# Patient Record
Sex: FEMALE | Race: WHITE | NOT HISPANIC OR LATINO | Employment: UNEMPLOYED | URBAN - METROPOLITAN AREA
[De-identification: names, ages, dates, MRNs, and addresses within clinical notes are randomized per-mention and may not be internally consistent; named-entity substitution may affect disease eponyms.]

---

## 2017-02-13 ENCOUNTER — OFFICE VISIT (OUTPATIENT)
Dept: FAMILY MEDICINE | Facility: OTHER | Age: 32
End: 2017-02-13
Attending: NURSE PRACTITIONER
Payer: COMMERCIAL

## 2017-02-13 VITALS
TEMPERATURE: 97.5 F | HEIGHT: 66 IN | HEART RATE: 94 BPM | DIASTOLIC BLOOD PRESSURE: 62 MMHG | RESPIRATION RATE: 12 BRPM | SYSTOLIC BLOOD PRESSURE: 98 MMHG

## 2017-02-13 DIAGNOSIS — F33.1 MODERATE EPISODE OF RECURRENT MAJOR DEPRESSIVE DISORDER (H): ICD-10-CM

## 2017-02-13 DIAGNOSIS — F41.9 ANXIETY: ICD-10-CM

## 2017-02-13 PROCEDURE — 99214 OFFICE O/P EST MOD 30 MIN: CPT | Performed by: NURSE PRACTITIONER

## 2017-02-13 PROCEDURE — 99212 OFFICE O/P EST SF 10 MIN: CPT

## 2017-02-13 RX ORDER — ESCITALOPRAM OXALATE 10 MG/1
20 TABLET ORAL DAILY
Qty: 60 TABLET | Refills: 1 | Status: SHIPPED | OUTPATIENT
Start: 2017-02-13 | End: 2017-04-05

## 2017-02-13 RX ORDER — HYDROXYZINE HYDROCHLORIDE 25 MG/1
25 TABLET, FILM COATED ORAL EVERY 6 HOURS PRN
Qty: 60 TABLET | Refills: 1 | Status: SHIPPED | OUTPATIENT
Start: 2017-02-13 | End: 2017-04-05

## 2017-02-13 ASSESSMENT — ANXIETY QUESTIONNAIRES
GAD7 TOTAL SCORE: 19
3. WORRYING TOO MUCH ABOUT DIFFERENT THINGS: NEARLY EVERY DAY
1. FEELING NERVOUS, ANXIOUS, OR ON EDGE: NEARLY EVERY DAY
6. BECOMING EASILY ANNOYED OR IRRITABLE: NEARLY EVERY DAY
2. NOT BEING ABLE TO STOP OR CONTROL WORRYING: NEARLY EVERY DAY
7. FEELING AFRAID AS IF SOMETHING AWFUL MIGHT HAPPEN: NEARLY EVERY DAY
5. BEING SO RESTLESS THAT IT IS HARD TO SIT STILL: MORE THAN HALF THE DAYS
IF YOU CHECKED OFF ANY PROBLEMS ON THIS QUESTIONNAIRE, HOW DIFFICULT HAVE THESE PROBLEMS MADE IT FOR YOU TO DO YOUR WORK, TAKE CARE OF THINGS AT HOME, OR GET ALONG WITH OTHER PEOPLE: SOMEWHAT DIFFICULT

## 2017-02-13 ASSESSMENT — PATIENT HEALTH QUESTIONNAIRE - PHQ9: 5. POOR APPETITE OR OVEREATING: MORE THAN HALF THE DAYS

## 2017-02-13 NOTE — PATIENT INSTRUCTIONS
ASSESSMENT AND PLAN    1. Anxiety  - escitalopram (LEXAPRO) 10 MG tablet; Take 2 tablets (20 mg) by mouth daily  Dispense: 60 tablet; Refill: 1  - hydrOXYzine (ATARAX) 25 MG tablet; Take 1 tablet (25 mg) by mouth every 6 hours as needed for itching  Dispense: 60 tablet; Refill: 1    2. Moderate episode of recurrent major depressive disorder (H)  - escitalopram (LEXAPRO) 10 MG tablet; Take 2 tablets (20 mg) by mouth daily  Dispense: 60 tablet; Refill: 1          [facility]  [phone]

## 2017-02-13 NOTE — NURSING NOTE
"Chief Complaint   Patient presents with     Recheck Medication     Patient reports her anxiety has increased.       Initial BP 98/62 (BP Location: Left arm, Patient Position: Chair, Cuff Size: Adult Large)  Pulse 94  Temp 97.5  F (36.4  C) (Tympanic)  Resp 12  Ht 5' 6\" (1.676 m)  Breastfeeding? No Estimated body mass index is 40.03 kg/(m^2) as calculated from the following:    Height as of 12/19/16: 5' 6\" (1.676 m).    Weight as of 12/19/16: 248 lb (112.5 kg).  Medication Reconciliation: complete   Sunitha Diaz      "

## 2017-02-13 NOTE — MR AVS SNAPSHOT
"              After Visit Summary   2/13/2017    Julia Fierro    MRN: [id number]           Patient Information     Date Of Birth          1985        Visit Information        Provider Department      2/13/2017 1:15 PM Samantha Quintana NP [facility]        Today's Diagnoses     Anxiety        Moderate episode of recurrent major depressive disorder (H)          Care Instructions          ASSESSMENT AND PLAN    1. Anxiety  - escitalopram (LEXAPRO) 10 MG tablet; Take 2 tablets (20 mg) by mouth daily  Dispense: 60 tablet; Refill: 1  - hydrOXYzine (ATARAX) 25 MG tablet; Take 1 tablet (25 mg) by mouth every 6 hours as needed for itching  Dispense: 60 tablet; Refill: 1    2. Moderate episode of recurrent major depressive disorder (H)  - escitalopram (LEXAPRO) 10 MG tablet; Take 2 tablets (20 mg) by mouth daily  Dispense: 60 tablet; Refill: 1          Samantha Quintana -[facility]  [phone]                           Follow-ups after your visit        Who to contact     If you have questions or need follow up information about today's clinic visit or your schedule please contact [facility] directly at [phone].  Normal or non-critical lab and imaging results will be communicated to you by MyChart, letter or phone within 4 business days after the clinic has received the results. If you do not hear from us within 7 days, please contact the clinic through indenihart or phone. If you have a critical or abnormal lab result, we will notify you by phone as soon as possible.  Submit refill requests through Prysm or call your pharmacy and they will forward the refill request to us. Please allow 3 business days for your refill to be completed.          Additional Information About Your Visit        MyChart Information     Prysm lets you send messages to your doctor, view your test results, renew your prescriptions, schedule appointments and more. To sign up, go to [email or web address] . Click on \"Log " "in\" on the left side of the screen, which will take you to the Welcome page. Then click on \"Sign up Now\" on the right side of the page.     You will be asked to enter the access code listed below, as well as some personal information. Please follow the directions to create your username and password.     Your access code is: [email or web address]  Expires: 3/19/2017  2:40 PM     Your access code will  in 90 days. If you need help or a new code, please call your [facility] clinic or [phone].        Care EveryWhere ID     This is your Care EveryWhere ID. This could be used by other organizations to access your [facility] medical records  [id number]        Your Vitals Were     Pulse Temperature Respirations Height Breastfeeding?       94 97.5  F (36.4  C) (Tympanic) 12 5' 6\" (1.676 m) No        Blood Pressure from Last 3 Encounters:   17 98/62   16 110/62   07/15/15 126/86    Weight from Last 3 Encounters:   16 248 lb (112.5 kg)   07/15/15 250 lb (113.4 kg)   03/25/15 267 lb (121.1 kg)              Today, you had the following     No orders found for display         Today's Medication Changes          These changes are accurate as of: 17  1:30 PM.  If you have any questions, ask your nurse or doctor.               Start taking these medicines.        Dose/Directions    hydrOXYzine 25 MG tablet   Commonly known as:  ATARAX   Used for:  Anxiety   Started by:  Samantha Quintana NP        Dose:  25 mg   Take 1 tablet (25 mg) by mouth every 6 hours as needed for itching   Quantity:  60 tablet   Refills:  1         These medicines have changed or have updated prescriptions.        Dose/Directions    escitalopram 10 MG tablet   Commonly known as:  LEXAPRO   This may have changed:  how much to take   Used for:  Anxiety, Moderate episode of recurrent major depressive disorder (H)   Changed by:  Samantha Quintana NP        Dose:  20 mg   Take 2 tablets (20 mg) by mouth daily   Quantity:  60 tablet   Refills:  1       "      Where to get your medicines      These medications were sent to [id number] - [place], [place] - [address]   [address] , [facility] [address]     Phone:  [phone]     escitalopram 10 MG tablet    hydrOXYzine 25 MG tablet                Primary Care Provider Office Phone # Fax #    Samantha Quintana [phone]       [address] [address]        Thank you!     Thank you for choosing [facility]  for your care. Our goal is always to provide you with excellent care. Hearing back from our patients is one way we can continue to improve our services. Please take a few minutes to complete the written survey that you may receive in the mail after your visit with us. Thank you!             Your Updated Medication List - Protect others around you: Learn how to safely use, store and throw away your medicines at www.disposemymeds.org.          This list is accurate as of: 2/13/17  1:30 PM.  Always use your most recent med list.                   Brand Name Dispense Instructions for use    aspirin 81 MG tablet      Take 81 mg by mouth daily       escitalopram 10 MG tablet    LEXAPRO    60 tablet    Take 2 tablets (20 mg) by mouth daily       hydrOXYzine 25 MG tablet    ATARAX    60 tablet    Take 1 tablet (25 mg) by mouth every 6 hours as needed for itching

## 2017-02-13 NOTE — PROGRESS NOTES
OUTPATIENT VISIT NOTE      CHIEF COMPLAINT:     Chief Complaint   Patient presents with     Recheck Medication     Patient reports her anxiety has increased.        SUBJECTIVE  Patient presents with a chief complaint of  Anxiety, depression        HPI:  Symptoms have been present for -  Long term  Aggravating factors  - living situation is difficult  Relieving factors -  meds minimally helpful  Recent stressors  - as above  Drug or alcohol use -  no  Past medications  - yes  Therapy  - no  Psychiatric history -  As in epic  Prior hospitalization for mental health concerns -  no  Suicidal Ideation or plan  - no      Past Medical History   Diagnosis Date     Anxiety      Anxiety 7/1/2014     Major depression, recurrent (H) 3/25/2015     Moderate episode of recurrent major depressive disorder (H) 3/25/2015     Panic attacks 7/15/2015     Past Surgical History   Procedure Laterality Date     Laparoscopy  2005     Endometriosis     Extraction(s) dental       wisdom teeth extracted     Family History   Problem Relation Age of Onset     Arthritis Mother      Coronary Artery Disease Mother      DIABETES Paternal Grandmother      Hyperlipidemia Father      Hypertension Father      Other Cancer Father      Social History     Social History     Marital status: Single     Spouse name: N/A     Number of children: N/A     Years of education: N/A     Occupational History     Not on file.     Social History Main Topics     Smoking status: Current Every Day Smoker     Types: Cigarettes     Smokeless tobacco: Never Used      Comment: tried to quit yes, yr quit 2008, no passive exposure     Alcohol use 0.0 oz/week     0 Standard drinks or equivalent per week      Comment: occasionally     Drug use: No     Sexual activity: Yes     Partners: Male     Birth control/ protection: Condom, Diaphragm     Other Topics Concern     Blood Transfusions Yes     Caffeine Concern Yes     soda, 16oz daily     Parent/Sibling W/ Cabg, Mi Or Angioplasty  "Before 65f 55m? Yes     Mother was 51      Social History Narrative     Allergies   Allergen Reactions     Sertraline Hcl      Lightheaded  Shakes        Shakes       Penicillins Rash       Current Outpatient Prescriptions   Medication     escitalopram (LEXAPRO) 10 MG tablet     aspirin 81 MG tablet     No current facility-administered medications for this visit.          REVIEW OF SYSTEMS  Skin: negative  Eyes: negative  Ears/Nose/Throat: negative  Respiratory: No shortness of breath, dyspnea on exertion, cough, or hemoptysis  Cardiovascular: negative  Gastrointestinal: negative  Musculoskeletal: negative  Neurologic: negative  Psychiatric: As above  Endocrine: negative      OBJECTIVE:   BP 98/62 (BP Location: Left arm, Patient Position: Chair, Cuff Size: Adult Large)  Pulse 94  Temp 97.5  F (36.4  C) (Tympanic)  Resp 12  Ht 5' 6\" (1.676 m)  Breastfeeding? No    Mental Status Assessment:  -Appearance/Behavior:No apparent distress and Casually groomed, attitude:pleasant and cooperative  -Motor: normal or unremarkable.  -Gait: Normal.   -Abnormal involuntary movements: None.  -Mood: description consistent with depression  -Affect: down, blunted  -Speech: Normal, clear, regular rate and volume.   -Thought process/associations: Logical and Goal directed.  -Thought content: normal .  -Perceptual disturbances: No hallucinations..   -Suicidal/Homicidal Ideation: Denies  -Judgment: Good.  -Insight: Good.  *Orientation: time, place and person.  *Memory: intact immediate, short and long term.  *Attention: Adequate for interview  *Language: fluent, no aphasias, able to repeat phrases and name objects. Vocab intact.  *Fund of information: appropriate for education  *Cognitive functioning estimate: 0 - independent.        Physical Exam:  Head: Normocephalic.   Neck: Neck supple. No adenopathy.   ENT: ENT exam normal, no neck nodes or sinus tenderness  Cardiovascular: negative, PMI normal.  No murmurs, clicks gallops or " rub  Respiratory:  Good diaphragmatic excursion. Lungs clear  Abdomen: Soft, non tender, active bowel sounds  Neurologic: Gait normal.  Sensation grossly  Skin: Normal, no cuts, or other notable abnormal findings    Last PHQ-9 score on record= 16      ASSESSMENT AND PLAN    1. Anxiety  - escitalopram (LEXAPRO) 10 MG tablet; Take 2 tablets (20 mg) by mouth daily  Dispense: 60 tablet; Refill: 1  - hydrOXYzine (ATARAX) 25 MG tablet; Take 1 tablet (25 mg) by mouth every 6 hours as needed for itching  Dispense: 60 tablet; Refill: 1    2. Moderate episode of recurrent major depressive disorder (H)  - escitalopram (LEXAPRO) 10 MG tablet; Take 2 tablets (20 mg) by mouth daily  Dispense: 60 tablet; Refill: 1          [facility] [place]  [phone]

## 2017-02-14 ENCOUNTER — TELEPHONE (OUTPATIENT)
Dept: FAMILY MEDICINE | Facility: OTHER | Age: 32
End: 2017-02-14

## 2017-02-14 ASSESSMENT — PATIENT HEALTH QUESTIONNAIRE - PHQ9: SUM OF ALL RESPONSES TO PHQ QUESTIONS 1-9: 15

## 2017-02-14 ASSESSMENT — ANXIETY QUESTIONNAIRES: GAD7 TOTAL SCORE: 19

## 2017-02-14 NOTE — TELEPHONE ENCOUNTER
12:19 PM    Reason for Call: Phone Call    Description: She would like her prescriptions to go to [facility].  She [facility]    Was an appointment offered for this call? No    Preferred method for responding to this message: Telephone Call    If we cannot reach you directly, may we leave a detailed response at the number you provided? Yes    Can this message wait until your PCP/provider returns, if available today? Not applicable,     Henny Stern

## 2017-03-27 ENCOUNTER — TELEPHONE (OUTPATIENT)
Dept: FAMILY MEDICINE | Facility: OTHER | Age: 32
End: 2017-03-27

## 2017-03-27 NOTE — TELEPHONE ENCOUNTER
I just had a few of my patients just find out they are pregnant, so I am full around that time (due around the end of the year).  She could see Geoffrey Quezada, midwife, who is covered by our OB providers in [place].  As far as the Lexapro, we would ideally change her over to Zoloft, which has a more established record in pregnancy, but she should run that by Samantha Quintana.

## 2017-03-27 NOTE — TELEPHONE ENCOUNTER
Call to patient who is currently in ER due to stomach pains and getting the baby checked out, she thanks you and just needs to make sure baby is alright

## 2017-03-27 NOTE — TELEPHONE ENCOUNTER
11:43 AM    Reason for Call: Phone Call    Description: Patient is a patient of Samantha Burnett, but just found out that she is pregnant and would like to set up an OB appt. Is Dr. De Leon able to take her as a new OB? Also, patient is on depression medication, Lexapro, and would like to know if she should stop taking this medication or if she should be taking something else. Patient stated that it is ok to leave message, especially regarding the medication.      Was an appointment offered for this call? Yes [facility] - 04/04    Preferred method for responding to this message: Telephone Call    If we cannot reach you directly, may we leave a detailed response at the number you provided? Yes    Can this message wait until your PCP/provider returns, if available today? Not applicable,     Annika Jessica

## 2017-03-31 ENCOUNTER — TRANSFERRED RECORDS (OUTPATIENT)
Dept: HEALTH INFORMATION MANAGEMENT | Facility: HOSPITAL | Age: 32
End: 2017-03-31

## 2017-03-31 LAB
ALT SERPL-CCNC: 48 U/L (ref 6–31)
AST SERPL-CCNC: 30 IU/L (ref 10–40)
CREAT SERPL-MCNC: 0.66 MG/DL (ref 0.4–1)
GLUCOSE SERPL-MCNC: 122 MG/DL (ref 70–100)
POTASSIUM SERPL-SCNC: 3.6 MEQ/L (ref 3.4–5.1)

## 2017-04-05 ENCOUNTER — HOSPITAL ENCOUNTER (EMERGENCY)
Facility: HOSPITAL | Age: 32
Discharge: HOME | End: 2017-04-05
Attending: PHYSICIAN ASSISTANT | Admitting: PHYSICIAN ASSISTANT
Payer: COMMERCIAL

## 2017-04-05 VITALS
SYSTOLIC BLOOD PRESSURE: 124 MMHG | TEMPERATURE: 98.3 F | HEIGHT: 66 IN | RESPIRATION RATE: 16 BRPM | OXYGEN SATURATION: 96 % | DIASTOLIC BLOOD PRESSURE: 75 MMHG

## 2017-04-05 DIAGNOSIS — F41.8 MIXED ANXIETY DEPRESSIVE DISORDER: ICD-10-CM

## 2017-04-05 DIAGNOSIS — Z34.90 PREGNANCY, UNSPECIFIED GESTATIONAL AGE: ICD-10-CM

## 2017-04-05 LAB — B-HCG SERPL-ACNC: ABNORMAL IU/L (ref 0–5)

## 2017-04-05 PROCEDURE — 99213 OFFICE O/P EST LOW 20 MIN: CPT | Performed by: PHYSICIAN ASSISTANT

## 2017-04-05 PROCEDURE — 84702 CHORIONIC GONADOTROPIN TEST: CPT | Performed by: PHYSICIAN ASSISTANT

## 2017-04-05 PROCEDURE — 99213 OFFICE O/P EST LOW 20 MIN: CPT

## 2017-04-05 PROCEDURE — 36415 COLL VENOUS BLD VENIPUNCTURE: CPT | Performed by: PHYSICIAN ASSISTANT

## 2017-04-05 RX ORDER — SWAB
1 SWAB, NON-MEDICATED MISCELLANEOUS DAILY
Qty: 30 EACH | Refills: 0 | Status: SHIPPED | OUTPATIENT
Start: 2017-04-05 | End: 2021-05-12

## 2017-04-05 RX ORDER — SERTRALINE HYDROCHLORIDE 25 MG/1
25 TABLET, FILM COATED ORAL DAILY
Qty: 14 TABLET | Refills: 0 | Status: SHIPPED | OUTPATIENT
Start: 2017-04-05 | End: 2017-04-15

## 2017-04-05 ASSESSMENT — ENCOUNTER SYMPTOMS
CARDIOVASCULAR NEGATIVE: 1
RESPIRATORY NEGATIVE: 1
CONSTITUTIONAL NEGATIVE: 1
NERVOUS/ANXIOUS: 1

## 2017-04-05 NOTE — ED AVS SNAPSHOT
[facility]    [address]    [place] [address]    Phone:  [phone]                                       Julia Fierro   MRN: [id number]    Department:  [facility]   Date of Visit:  4/5/2017           Patient Information     Date Of Birth          1985        Your diagnoses for this visit were:     None       You were seen by Guillermo Masters PA.      Follow-up Information     Please follow up.    Why:  Samantha in 7 days        Discharge Instructions       - Will start with low dose zoloft and follow up with Samantha in 1 week.   - I also ordered the vitamin D and prenatal to get you started.   - Last HCG was 2713, todays is pending - we can call with results. Call back here in 1 hr if you dont hear anything.    Discharge References/Attachments     PREGNANCY, NEW DX (ENGLISH)    PREGNANCY, NUTRITION DURING (ENGLISH)         Review of your medicines      START taking        Dose / Directions Last dose taken    prenatal multivitamin  with iron 28-0.8 MG Tabs   Dose:  1 tablet   Quantity:  30 each        Take 1 tablet by mouth daily   Refills:  0        sertraline 25 MG tablet   Commonly known as:  ZOLOFT   Dose:  25 mg   Quantity:  14 tablet        Take 1 tablet (25 mg) by mouth daily   Refills:  0        VITAMIN D3 HIGH POTENCY 1000 UNITS Caps   Dose:  1 capsule   Quantity:  30 capsule        Take 1 capsule by mouth daily   Refills:  0          Our records show that you are taking the medicines listed below. If these are incorrect, please call your family doctor or clinic.        Dose / Directions Last dose taken    aspirin 81 MG tablet   Dose:  81 mg        Take 81 mg by mouth daily   Refills:  0          STOP taking        Dose Reason for stopping Comments    escitalopram 10 MG tablet   Commonly known as:  LEXAPRO                      Prescriptions were sent or printed at these locations (3 Prescriptions)                   [facility] [address] [place], [place] - [address] [facility] [address]   [address], [facility] [address]    Telephone:  [phone]   Fax:  [phone]   Hours:                  E-Prescribed (3 of 3)         sertraline (ZOLOFT) 25 MG tablet               Cholecalciferol (VITAMIN D3 HIGH POTENCY) 1000 UNITS CAPS               Prenatal Vit-Fe Fumarate-FA (PRENATAL MULTIVITAMIN  WITH IRON) 28-0.8 MG TABS                Procedures and tests performed during your visit     HCG quantitative pregnancy (blood)      Orders Needing Specimen Collection     None      Pending Results     Date and Time Order Name Status Description    2017 1411 HCG quantitative pregnancy (blood) In process             Pending Culture Results     No orders found from 4/3/2017 to 2017.            Thank you for choosing [facility]       Thank you for choosing [facility] for your care. Our goal is always to provide you with excellent care. Hearing back from our patients is one way we can continue to improve our services. Please take a few minutes to complete the written survey that you may receive in the mail after you visit with us. Thank you!        [email or web address] Information     [email or web address] lets you send messages to your doctor, view your test results, renew your prescriptions, schedule appointments and more. To sign up, go to [email or web address] . Click on \"Log in\" on the left side of the screen, which will take you to the Welcome page. Then click on \"Sign up Now\" on the right side of the page.     You will be asked to enter the access code listed below, as well as some personal information. Please follow the directions to create your username and password.     Your access code is: [email or web address]  Expires: 2017  2:58 PM     Your access code will  in 90 days. If you need help or a new code, please call your [facility] clinic or [phone].        Care EveryWhere ID     This is your Care EveryWhere ID. This could be used by other organizations to access " your [facility] medical records  [id number]        After Visit Summary       This is your record. Keep this with you and show to your community pharmacist(s) and doctor(s) at your next visit.

## 2017-04-05 NOTE — ED NOTES
"LMP 2/3/2017. Pregnancy test done at clinic. Denies any vaginal discharge or cramping. Stated she quit her lexapro when she found out she was pregnant 2 weeks ago and now doesn't feel like herself. \"I want to know what is safe to take while pregnant. Pt stated she had a scheduled OB appointment to day that she missed.  "

## 2017-04-05 NOTE — ED PROVIDER NOTES
"  History     Chief Complaint   Patient presents with     Medication Refill     Pt concerned what medications she should be on while pregnant. Was on lexapro- has been off for 2 weeks.     The history is provided by the patient. No  was used.     Julia Fierro is a 31 year old 8? wk pg female with Hx anxiety/depression, who presents requesting advice regarding what medications she can take during pregnancy. First day LMP was 2/4/2017. She reports feeling quite anxious, but NO SI/HI.     NO vaginal bleeding, no pelvic pain, and no further abdominal pain since visit to ED on 3/21/17. It appears they were initially concerned about STI vs ectopic. Quantitative HCG was 2713. Pt left ED prior to US/pevlic. See Care Everywhere for further details.    I have reviewed the Medications, Allergies, Past Medical and Surgical History, and Social History in the Epic system.    Review of Systems   Constitutional: Negative.    Respiratory: Negative.    Cardiovascular: Negative.    Psychiatric/Behavioral: The patient is nervous/anxious.        Physical Exam   BP: 124/75  Heart Rate: 83  Temp: 98.3  F (36.8  C)  Resp: 16  Height: 167.6 cm (5' 6\")  SpO2: 96 %  Physical Exam   Constitutional: She is oriented to person, place, and time. She appears well-developed and well-nourished. No distress.   Cardiovascular: Normal rate.    Pulmonary/Chest: Effort normal.   Neurological: She is alert and oriented to person, place, and time.   Skin: Skin is warm and dry.   Psychiatric: She has a normal mood and affect.   Nursing note and vitals reviewed.      ED Course     ED Course     Procedures          Assessments & Plan (with Medical Decision Making)     I have reviewed the nursing notes.    I have reviewed the findings, diagnosis, plan and need for follow up with the patient.    Discharge Medication List as of 4/5/2017  2:58 PM      START taking these medications    Details   sertraline (ZOLOFT) 25 MG tablet Take 1 tablet " (25 mg) by mouth daily, Disp-14 tablet, R-0, E-Prescribe      Cholecalciferol (VITAMIN D3 HIGH POTENCY) 1000 UNITS CAPS Take 1 capsule by mouth daily, Disp-30 capsule, R-0, E-Prescribe      Prenatal Vit-Fe Fumarate-FA (PRENATAL MULTIVITAMIN  WITH IRON) 28-0.8 MG TABS Take 1 tablet by mouth daily, Disp-30 each, R-0, E-Prescribe             Final diagnoses:   Mixed anxiety depressive disorder   Pregnancy, unspecified gestational age   Pg wheel indicates 8 wk, 4D, hcg quant has risen from 2713 to 09594 since discharge from ED on 3/31/17. Pt denies pain, spotting/bleeding.     PCP (Samantha Quintana NP) advised trial zoloft at low dose 25 mg. Pt had ASE in the past, so she will stop medication and seek attention with ANY concerns regarding initiating med. She will otherwise f/u with PCP in 7 days. Patient verbally educated and has no questions.    Guillermo Masters PA-C   4/5/2017   4:01 PM    4/5/2017   [facility]     Guillermo Masters PA  04/05/17 0226

## 2017-04-05 NOTE — ED AVS SNAPSHOT
[facility]    [address] [address]    Phone:  [phone]                                       Julia Fierro   MRN: [id number]    Department:  [facility]   Date of Visit:  4/5/2017           After Visit Summary Signature Page     I have received my discharge instructions, and my questions have been answered. I have discussed any challenges I see with this plan with the nurse or doctor.    ..........................................................................................................................................  Patient/Patient Representative Signature      ..........................................................................................................................................  Patient Representative Print Name and Relationship to Patient    ..................................................               ................................................  Date                                            Time    ..........................................................................................................................................  Reviewed by Signature/Title    ...................................................              ..............................................  Date                                                            Time

## 2017-04-05 NOTE — ED NOTES
Patient anxious & patient quit taking Lexapro 10 mg. Is concerned with her baby & how far along she is.

## 2017-04-15 ENCOUNTER — HOSPITAL ENCOUNTER (EMERGENCY)
Facility: HOSPITAL | Age: 32
Discharge: HOME | End: 2017-04-15
Attending: NURSE PRACTITIONER | Admitting: NURSE PRACTITIONER
Payer: COMMERCIAL

## 2017-04-15 VITALS
DIASTOLIC BLOOD PRESSURE: 65 MMHG | OXYGEN SATURATION: 99 % | SYSTOLIC BLOOD PRESSURE: 126 MMHG | TEMPERATURE: 96.5 F | RESPIRATION RATE: 16 BRPM

## 2017-04-15 DIAGNOSIS — K08.89 PAIN, DENTAL: ICD-10-CM

## 2017-04-15 DIAGNOSIS — Z33.1 PREGNANCY, INCIDENTAL: ICD-10-CM

## 2017-04-15 PROCEDURE — 99213 OFFICE O/P EST LOW 20 MIN: CPT | Performed by: NURSE PRACTITIONER

## 2017-04-15 PROCEDURE — 99213 OFFICE O/P EST LOW 20 MIN: CPT

## 2017-04-15 RX ORDER — CHLORHEXIDINE GLUCONATE ORAL RINSE 1.2 MG/ML
10 SOLUTION DENTAL 2 TIMES DAILY
Qty: 473 ML | Refills: 0 | Status: SHIPPED | OUTPATIENT
Start: 2017-04-15 | End: 2017-04-18

## 2017-04-15 ASSESSMENT — ENCOUNTER SYMPTOMS
APPETITE CHANGE: 1
FEVER: 0

## 2017-04-15 NOTE — ED PROVIDER NOTES
History     Chief Complaint   Patient presents with     Dental Pain     The history is provided by the patient. No  was used.     Julia Fierro is a 31 year old female who presents with R upper dental pain for 5 days, much worse today. Has pain in her upper maxillary area and a headache. Hasn't taken anything for her symptoms. Is 11 weeks pregnant. Has had worked on this tooth several times in the past few day.    I have reviewed the Medications, Allergies, Past Medical and Surgical History, and Social History in the Epic system.    Review of Systems   Constitutional: Positive for appetite change (Hasn't eaten yet and feels poorly). Negative for fever.   HENT: Positive for dental problem.        Physical Exam   BP: 126/65  Heart Rate: 72  Temp: 96.5  F (35.8  C)  Resp: 16  SpO2: 99 %  Physical Exam   Constitutional: She appears well-developed and well-nourished. No distress.   HENT:   Head: Normocephalic and atraumatic.   Right Ear: External ear normal.   Left Ear: External ear normal.   Nose: Nose normal.   Mouth/Throat: Oropharynx is clear and moist. No oropharyngeal exudate.   Eyes: Conjunctivae are normal. No scleral icterus.   Neck: Normal range of motion. Neck supple.   Cardiovascular: Normal rate.    Pulmonary/Chest: Effort normal.   Skin: She is not diaphoretic.   Nursing note and vitals reviewed.      ED Course     ED Course     Procedures            Labs Ordered and Resulted from Time of ED Arrival Up to the Time of Departure from the ED - No data to display    Assessments & Plan (with Medical Decision Making)     I have reviewed the nursing notes.  I have reviewed the findings, diagnosis, plan and need for follow up with the patient.  No infection evident.   Will place consult for  to get her an appointment at the [facility] where she has been seen before.   As she is pregnant and established with, she should have priority.   Will order mouthwash and Tylenol for her.    Is scheduled to see Ob/Gyn next week for her current pregnancy.     New Prescriptions    ACETAMINOPHEN (TYLENOL) 325 MG CAPS    Take 2 tablets by mouth 3 times daily as needed    CHLORHEXIDINE (PERIDEX) 0.12 % SOLUTION    Swish and spit 10 mLs in mouth 2 times daily DO NOT SWALLOW       Final diagnoses:   Pain, dental   Pregnancy, incidental       4/15/2017   [facility]     Analisa Boucher NP  04/15/17 1154       Analisa Boucher NP  04/15/17 1200

## 2017-04-15 NOTE — ED AVS SNAPSHOT
[facility]    [address] [address]    Phone:  [phone]                                       Julia Fierro   MRN: [id number]    Department:  [facility]   Date of Visit:  4/15/2017           After Visit Summary Signature Page     I have received my discharge instructions, and my questions have been answered. I have discussed any challenges I see with this plan with the nurse or doctor.    ..........................................................................................................................................  Patient/Patient Representative Signature      ..........................................................................................................................................  Patient Representative Print Name and Relationship to Patient    ..................................................               ................................................  Date                                            Time    ..........................................................................................................................................  Reviewed by Signature/Title    ...................................................              ..............................................  Date                                                            Time

## 2017-04-15 NOTE — ED AVS SNAPSHOT
[facility]    [address] [address]    Phone:  [phone]                                       Julia Fierro   MRN: [id number]    Department:  [facility]   Date of Visit:  4/15/2017           Patient Information     Date Of Birth          1985        Your diagnoses for this visit were:     Pain, dental     Pregnancy, incidental        You were seen by Analisa Boucher NP.      Follow-up Information     Please follow up.    Why:  See the dentist ASAP, the  will call you with an appointment        Discharge Instructions       Will have the  set up a follow up dentist visit with the [facility].        Discharge References/Attachments     DENTAL PAIN (ENGLISH)      Future Appointments        Provider Department Dept Phone Center    4/18/2017 3:00 PM Dariel Hallman MD [facility] [phone] [facility]         Review of your medicines      START taking        Dose / Directions Last dose taken    Acetaminophen 325 MG Caps   Commonly known as:  TYLENOL   Dose:  2 tablet   Quantity:  100 capsule        Take 2 tablets by mouth 3 times daily as needed   Refills:  0        chlorhexidine 0.12 % solution   Commonly known as:  PERIDEX   Dose:  10 mL   Quantity:  473 mL        Swish and spit 10 mLs in mouth 2 times daily DO NOT SWALLOW   Refills:  0          Our records show that you are taking the medicines listed below. If these are incorrect, please call your family doctor or clinic.        Dose / Directions Last dose taken    prenatal multivitamin  with iron 28-0.8 MG Tabs   Dose:  1 tablet   Quantity:  30 each        Take 1 tablet by mouth daily   Refills:  0        VITAMIN D3 HIGH POTENCY 1000 UNITS Caps   Dose:  1 capsule   Quantity:  30 capsule        Take 1 capsule by mouth daily   Refills:  0                Prescriptions were sent or printed at these locations (2 Prescriptions)                   [facility]  "[id number] - [place], [place] - [address] AT [facility] [address]   [address], [place] [address]    Telephone:  [phone]   Fax:  [phone]   Hours:                  E-Prescribed (2 of 2)         Acetaminophen (TYLENOL) 325 MG CAPS               chlorhexidine (PERIDEX) 0.12 % solution                Orders Needing Specimen Collection     None      Pending Results     No orders found from 2017 to 2017.            Pending Culture Results     No orders found from 2017 to 2017.            Thank you for choosing [facility]       Thank you for choosing [facility] for your care. Our goal is always to provide you with excellent care. Hearing back from our patients is one way we can continue to improve our services. Please take a few minutes to complete the written survey that you may receive in the mail after you visit with us. Thank you!        [employer] Information     [employer] lets you send messages to your doctor, view your test results, renew your prescriptions, schedule appointments and more. To sign up, go to [email or web address] . Click on \"Log in\" on the left side of the screen, which will take you to the Welcome page. Then click on \"Sign up Now\" on the right side of the page.     You will be asked to enter the access code listed below, as well as some personal information. Please follow the directions to create your username and password.     Your access code is: [email or web address]  Expires: 2017  2:58 PM     Your access code will  in 90 days. If you need help or a new code, please call your [facility] clinic or [phone].        Care EveryWhere ID     This is your Care EveryWhere ID. This could be used by other organizations to access your [facility] medical records  [id number]        After Visit Summary       This is your record. Keep this with you and show to your community pharmacist(s) and doctor(s) at your next visit.                  "

## 2017-04-18 ENCOUNTER — PRENATAL OFFICE VISIT (OUTPATIENT)
Dept: OBGYN | Facility: OTHER | Age: 32
End: 2017-04-18
Attending: OBSTETRICS & GYNECOLOGY
Payer: COMMERCIAL

## 2017-04-18 VITALS
DIASTOLIC BLOOD PRESSURE: 60 MMHG | OXYGEN SATURATION: 99 % | HEIGHT: 66 IN | HEART RATE: 61 BPM | BODY MASS INDEX: 36.96 KG/M2 | SYSTOLIC BLOOD PRESSURE: 112 MMHG | WEIGHT: 230 LBS

## 2017-04-18 DIAGNOSIS — R82.90 ABNORMAL URINE FINDINGS: ICD-10-CM

## 2017-04-18 DIAGNOSIS — F41.9 ANXIETY: ICD-10-CM

## 2017-04-18 DIAGNOSIS — Z34.91 PREGNANCY WITH UNCERTAIN DATES, FIRST TRIMESTER: ICD-10-CM

## 2017-04-18 DIAGNOSIS — Z34.81 ENCOUNTER FOR SUPERVISION OF OTHER NORMAL PREGNANCY, FIRST TRIMESTER: Primary | ICD-10-CM

## 2017-04-18 LAB
ABO + RH BLD: NORMAL
ABO + RH BLD: NORMAL
ALBUMIN UR-MCNC: NEGATIVE MG/DL
AMPHETAMINES UR QL SCN: ABNORMAL
APPEARANCE UR: CLEAR
BACTERIA #/AREA URNS HPF: ABNORMAL /HPF
BARBITURATES UR QL: ABNORMAL
BENZODIAZ UR QL: ABNORMAL
BILIRUB UR QL STRIP: NEGATIVE
BLD GP AB SCN SERPL QL: NORMAL
BLOOD BANK CMNT PATIENT-IMP: NORMAL
BLOOD BANK CMNT PATIENT-IMP: NORMAL
CANNABINOIDS UR QL SCN: ABNORMAL
COCAINE UR QL: ABNORMAL
COLOR UR AUTO: ABNORMAL
ERYTHROCYTE [DISTWIDTH] IN BLOOD BY AUTOMATED COUNT: 12.4 % (ref 10–15)
EST. AVERAGE GLUCOSE BLD GHB EST-MCNC: 94 MG/DL
GLUCOSE SERPL-MCNC: 85 MG/DL (ref 70–99)
GLUCOSE UR STRIP-MCNC: NEGATIVE MG/DL
HBA1C MFR BLD: 4.9 % (ref 4.3–6)
HCT VFR BLD AUTO: 33.5 % (ref 35–47)
HGB BLD-MCNC: 11.7 G/DL (ref 11.7–15.7)
HGB UR QL STRIP: NEGATIVE
KETONES UR STRIP-MCNC: NEGATIVE MG/DL
LEUKOCYTE ESTERASE UR QL STRIP: ABNORMAL
MCH RBC QN AUTO: 31.5 PG (ref 26.5–33)
MCHC RBC AUTO-ENTMCNC: 34.9 G/DL (ref 31.5–36.5)
MCV RBC AUTO: 90 FL (ref 78–100)
METHADONE UR QL SCN: ABNORMAL
MUCOUS THREADS #/AREA URNS LPF: PRESENT /LPF
NITRATE UR QL: NEGATIVE
OPIATES UR QL SCN: ABNORMAL
PCP UR QL SCN: ABNORMAL
PH UR STRIP: 6.5 PH (ref 4.7–8)
PLATELET # BLD AUTO: 245 10E9/L (ref 150–450)
RBC # BLD AUTO: 3.71 10E12/L (ref 3.8–5.2)
RBC #/AREA URNS AUTO: 0 /HPF (ref 0–2)
SP GR UR STRIP: 1.01 (ref 1–1.03)
SPECIMEN EXP DATE BLD: NORMAL
SQUAMOUS #/AREA URNS AUTO: 9 /HPF (ref 0–1)
TSH SERPL DL<=0.05 MIU/L-ACNC: 0.94 MU/L (ref 0.4–4)
URN SPEC COLLECT METH UR: ABNORMAL
UROBILINOGEN UR STRIP-MCNC: NORMAL MG/DL (ref 0–2)
WBC # BLD AUTO: 8.9 10E9/L (ref 4–11)
WBC #/AREA URNS AUTO: 2 /HPF (ref 0–2)

## 2017-04-18 PROCEDURE — 82947 ASSAY GLUCOSE BLOOD QUANT: CPT | Performed by: OBSTETRICS & GYNECOLOGY

## 2017-04-18 PROCEDURE — 85027 COMPLETE CBC AUTOMATED: CPT | Performed by: OBSTETRICS & GYNECOLOGY

## 2017-04-18 PROCEDURE — 80349 CANNABINOIDS NATURAL: CPT | Performed by: OBSTETRICS & GYNECOLOGY

## 2017-04-18 PROCEDURE — 36415 COLL VENOUS BLD VENIPUNCTURE: CPT | Performed by: OBSTETRICS & GYNECOLOGY

## 2017-04-18 PROCEDURE — 76817 TRANSVAGINAL US OBSTETRIC: CPT | Mod: TC | Performed by: OBSTETRICS & GYNECOLOGY

## 2017-04-18 PROCEDURE — 87340 HEPATITIS B SURFACE AG IA: CPT | Performed by: OBSTETRICS & GYNECOLOGY

## 2017-04-18 PROCEDURE — 84443 ASSAY THYROID STIM HORMONE: CPT | Performed by: OBSTETRICS & GYNECOLOGY

## 2017-04-18 PROCEDURE — 86762 RUBELLA ANTIBODY: CPT | Performed by: OBSTETRICS & GYNECOLOGY

## 2017-04-18 PROCEDURE — 99000 SPECIMEN HANDLING OFFICE-LAB: CPT | Performed by: OBSTETRICS & GYNECOLOGY

## 2017-04-18 PROCEDURE — 80307 DRUG TEST PRSMV CHEM ANLYZR: CPT | Performed by: OBSTETRICS & GYNECOLOGY

## 2017-04-18 PROCEDURE — 40000788 ZZHCL STATISTIC ESTIMATED AVERAGE GLUCOSE: Performed by: OBSTETRICS & GYNECOLOGY

## 2017-04-18 PROCEDURE — 99213 OFFICE O/P EST LOW 20 MIN: CPT | Mod: 25 | Performed by: COUNSELOR

## 2017-04-18 PROCEDURE — 87389 HIV-1 AG W/HIV-1&-2 AB AG IA: CPT | Performed by: OBSTETRICS & GYNECOLOGY

## 2017-04-18 PROCEDURE — 83036 HEMOGLOBIN GLYCOSYLATED A1C: CPT | Performed by: OBSTETRICS & GYNECOLOGY

## 2017-04-18 PROCEDURE — 86900 BLOOD TYPING SEROLOGIC ABO: CPT | Performed by: OBSTETRICS & GYNECOLOGY

## 2017-04-18 PROCEDURE — 81001 URINALYSIS AUTO W/SCOPE: CPT | Performed by: OBSTETRICS & GYNECOLOGY

## 2017-04-18 PROCEDURE — 99207 ZZC FIRST OB VISIT: CPT | Mod: 25 | Performed by: OBSTETRICS & GYNECOLOGY

## 2017-04-18 PROCEDURE — 86901 BLOOD TYPING SEROLOGIC RH(D): CPT | Performed by: OBSTETRICS & GYNECOLOGY

## 2017-04-18 PROCEDURE — 86780 TREPONEMA PALLIDUM: CPT | Performed by: OBSTETRICS & GYNECOLOGY

## 2017-04-18 PROCEDURE — 86850 RBC ANTIBODY SCREEN: CPT | Performed by: OBSTETRICS & GYNECOLOGY

## 2017-04-18 PROCEDURE — 87086 URINE CULTURE/COLONY COUNT: CPT | Performed by: OBSTETRICS & GYNECOLOGY

## 2017-04-18 ASSESSMENT — PAIN SCALES - GENERAL: PAINLEVEL: NO PAIN (0)

## 2017-04-18 NOTE — NURSING NOTE
"Chief Complaint   Patient presents with     Prenatal Care       Initial /60  Pulse 61  Ht 5' 6\" (1.676 m)  Wt 230 lb (104.3 kg)  SpO2 99%  BMI 37.12 kg/m2 Estimated body mass index is 37.12 kg/(m^2) as calculated from the following:    Height as of this encounter: 5' 6\" (1.676 m).    Weight as of this encounter: 230 lb (104.3 kg).  Medication Reconciliation: complete     Enedina Myles      "

## 2017-04-18 NOTE — PROGRESS NOTES
"  HPI:  Julia Fierro is a 31 year old female  No LMP recorded. Patient is pregnant. at Unknown, Estimated Date of Delivery: Data Unavailable.  She denies vaginal bleeding and abdominal pain.   Some morning sickness.    Stopped Lexapro when she found out she was pregnant.  Some increased anxiety since.    No other c/o.  Denies smoking/drug use.     Past Medical History:   Diagnosis Date     Anxiety      Anxiety 2014     Major depression, recurrent (H) 3/25/2015     Moderate episode of recurrent major depressive disorder (H) 3/25/2015     Panic attacks 7/15/2015       Past Surgical History:   Procedure Laterality Date     EXTRACTION(S) DENTAL      wisdom teeth extracted     LAPAROSCOPY      Endometriosis       Allergies: Sertraline hcl and Penicillins     EXAM:  Blood pressure 112/60, pulse 61, height 5' 6\" (1.676 m), weight 230 lb (104.3 kg), SpO2 99 %, not currently breastfeeding.   BMI= Body mass index is 37.12 kg/(m^2).  General - pleasant female in no acute distress.  Abdomen - soft, nontender, nondistended, no hepatosplenomegaly.  Pelvic - EG: normal adult female, BUS: within normal limits,Rectovaginal - deferred.    US:    Transvaginal:Yes  Yolk sac present:Yes  CRL:  9.2mm  FCA present:Yes  EGA 6w 6d  DEANGELO:17          ASSESSMENT/PLAN:  (Z34.81) Encounter for supervision of other normal pregnancy, first trimester  (primary encounter diagnosis)  Comment: Uncertain dates, use US DEANGELO  Plan: ABO/Rh type and screen, Anti Treponema, CBC         with platelets, Drug Screen Urine ([facility]),         Hepatitis B surface antigen, HIV Antigen         Antibody Combo, Rubella Antibody IgG         Quantitative, UA with Microscopic reflex to         Culture, TSH, Hemoglobin A1c, Glucose,         Estimated Average Glucose    Anxiety:  Medication uses, risks in first trimester discussed.  Consider restarting Lexapro if worsens or second trimester.    Handouts on morning sickness and OTC treatments reviewed with " pt.         1st Trimester precautions and testing discussed.  New OB Labs ordered and exam scheduled.  Aneuploidy testing options discussed.  Patient has my card and phone number to call prn if problems in interim.    Dariel Hallman

## 2017-04-18 NOTE — MR AVS SNAPSHOT
"              After Visit Summary   4/18/2017    Julia Fierro    MRN: [id number]           Patient Information     Date Of Birth          1985        Visit Information        Provider Department      4/18/2017 3:00 PM Dariel Hallman MD [facility]        Today's Diagnoses     Encounter for supervision of other normal pregnancy, first trimester    -  1    Abnormal urine findings        Pregnancy with uncertain dates, first trimester        Anxiety          Care Instructions    F/u 4 wks        Follow-ups after your visit        Your next 10 appointments already scheduled     May 18, 2017  1:45 PM CDT   (Arrive by 1:30 PM)   New Prenatal with Anais Larry NP   [facility] ([facility])    [address]  [facility] [address]   [phone]              Who to contact     If you have questions or need follow up information about today's clinic visit or your schedule please contact [facility] directly at [phone].  Normal or non-critical lab and imaging results will be communicated to you by MyChart, letter or phone within 4 business days after the clinic has received the results. If you do not hear from us within 7 days, please contact the clinic through MyChart or phone. If you have a critical or abnormal lab result, we will notify you by phone as soon as possible.  Submit refill requests through [employer] or call your pharmacy and they will forward the refill request to us. Please allow 3 business days for your refill to be completed.          Additional Information About Your Visit        [employer] Information     [employer] lets you send messages to your doctor, view your test results, renew your prescriptions, schedule appointments and more. To sign up, go to [email or web address] . Click on \"Log in\" on the left side of the screen, which will take you to the Welcome page. Then click on \"Sign up Now\" on the right side of the page.     You will be asked to " "enter the access code listed below, as well as some personal information. Please follow the directions to create your username and password.     Your access code is: [email or web address]  Expires: 2017  2:58 PM     Your access code will  in 90 days. If you need help or a new code, please call your [facility] or [phone].        Care EveryWhere ID     This is your Care EveryWhere ID. This could be used by other organizations to access your [facility] medical records  [id number]        Your Vitals Were     Pulse Height Pulse Oximetry BMI (Body Mass Index)          61 5' 6\" (1.676 m) 99% 37.12 kg/m2         Blood Pressure from Last 3 Encounters:   17 112/60   04/15/17 126/65   17 124/75    Weight from Last 3 Encounters:   17 230 lb (104.3 kg)   16 248 lb (112.5 kg)   07/15/15 250 lb (113.4 kg)              We Performed the Following     ABO/Rh type and screen     Anti Treponema     CBC with platelets     Drug Screen Urine ([facility])     Estimated Average Glucose     Glucose     Hemoglobin A1c     Hepatitis B surface antigen     HIV Antigen Antibody Combo     Rubella Antibody IgG Quantitative     TSH     UA with Microscopic reflex to Culture     Urine Culture Aerobic Bacterial     US OB TRANSVAGINAL        Primary Care Provider Office Phone # Fax #    Samantha QuintanaJAC [phone]       [address] [address]        Thank you!     Thank you for choosing [facility]  for your care. Our goal is always to provide you with excellent care. Hearing back from our patients is one way we can continue to improve our services. Please take a few minutes to complete the written survey that you may receive in the mail after your visit with us. Thank you!             Your Updated Medication List - Protect others around you: Learn how to safely use, store and throw away your medicines at www.disposemymeds.org.          This list is accurate as of: " 4/18/17  5:30 PM.  Always use your most recent med list.                   Brand Name Dispense Instructions for use    prenatal multivitamin  with iron 28-0.8 MG Tabs     30 each    Take 1 tablet by mouth daily       VITAMIN D3 HIGH POTENCY 1000 UNITS Caps     30 capsule    Take 1 capsule by mouth daily

## 2017-04-19 DIAGNOSIS — F12.10 CANNABIS ABUSE: Primary | ICD-10-CM

## 2017-04-20 LAB
BACTERIA SPEC CULT: NO GROWTH
HBV SURFACE AG SERPL QL IA: NONREACTIVE
HIV 1+2 AB+HIV1 P24 AG SERPL QL IA: NORMAL
MICRO REPORT STATUS: NORMAL
RUBV IGG SERPL IA-ACNC: 18 IU/ML
SPECIMEN SOURCE: NORMAL
T PALLIDUM IGG+IGM SER QL: NEGATIVE

## 2017-04-24 LAB — CANNABINOIDS UR CFM-MCNC: 271 NG/ML

## 2017-04-26 ENCOUNTER — TELEPHONE (OUTPATIENT)
Dept: CASE MANAGEMENT | Facility: HOSPITAL | Age: 32
End: 2017-04-26

## 2017-04-26 NOTE — TELEPHONE ENCOUNTER
Message left for patient as a follow up from Margarita in urgent care.  She is needing dental care.  Wait to see if she is needing additional support when she calls back.

## 2017-05-13 ENCOUNTER — TRANSFERRED RECORDS (OUTPATIENT)
Dept: HEALTH INFORMATION MANAGEMENT | Facility: HOSPITAL | Age: 32
End: 2017-05-13

## 2017-05-13 LAB
CREAT SERPL-MCNC: 0.7 MG/DL (ref 0.4–1)
GLUCOSE SERPL-MCNC: 85 MG/DL (ref 70–100)
POTASSIUM SERPL-SCNC: 3.7 MEQ/L (ref 3.4–5.1)

## 2017-06-09 ENCOUNTER — HOSPITAL ENCOUNTER (EMERGENCY)
Facility: HOSPITAL | Age: 32
Discharge: HOME | End: 2017-06-09
Attending: INTERNAL MEDICINE | Admitting: INTERNAL MEDICINE
Payer: COMMERCIAL

## 2017-06-09 VITALS
SYSTOLIC BLOOD PRESSURE: 90 MMHG | RESPIRATION RATE: 16 BRPM | DIASTOLIC BLOOD PRESSURE: 49 MMHG | TEMPERATURE: 96.4 F | OXYGEN SATURATION: 97 %

## 2017-06-09 DIAGNOSIS — M54.6 ACUTE LEFT-SIDED THORACIC BACK PAIN: ICD-10-CM

## 2017-06-09 LAB
ALBUMIN UR-MCNC: NEGATIVE MG/DL
APPEARANCE UR: CLEAR
BACTERIA #/AREA URNS HPF: ABNORMAL /HPF
BILIRUB UR QL STRIP: NEGATIVE
COLOR UR AUTO: YELLOW
GLUCOSE UR STRIP-MCNC: NEGATIVE MG/DL
HGB UR QL STRIP: NEGATIVE
KETONES UR STRIP-MCNC: NEGATIVE MG/DL
LEUKOCYTE ESTERASE UR QL STRIP: ABNORMAL
MUCOUS THREADS #/AREA URNS LPF: PRESENT /LPF
NITRATE UR QL: NEGATIVE
PH UR STRIP: 6 PH (ref 4.7–8)
RBC #/AREA URNS AUTO: 0 /HPF (ref 0–2)
SP GR UR STRIP: 1.02 (ref 1–1.03)
SQUAMOUS #/AREA URNS AUTO: 7 /HPF (ref 0–1)
URN SPEC COLLECT METH UR: ABNORMAL
UROBILINOGEN UR STRIP-MCNC: NORMAL MG/DL (ref 0–2)
WBC #/AREA URNS AUTO: 3 /HPF (ref 0–2)

## 2017-06-09 PROCEDURE — 99283 EMERGENCY DEPT VISIT LOW MDM: CPT

## 2017-06-09 PROCEDURE — 99283 EMERGENCY DEPT VISIT LOW MDM: CPT | Performed by: INTERNAL MEDICINE

## 2017-06-09 PROCEDURE — 87086 URINE CULTURE/COLONY COUNT: CPT | Performed by: INTERNAL MEDICINE

## 2017-06-09 PROCEDURE — 87591 N.GONORRHOEAE DNA AMP PROB: CPT | Performed by: INTERNAL MEDICINE

## 2017-06-09 PROCEDURE — 81001 URINALYSIS AUTO W/SCOPE: CPT | Performed by: INTERNAL MEDICINE

## 2017-06-09 PROCEDURE — 87491 CHLMYD TRACH DNA AMP PROBE: CPT | Performed by: INTERNAL MEDICINE

## 2017-06-09 NOTE — ED NOTES
Admitting was in room with patient to finish registration and when admitting left room patient walked out of ED without receiving discharge instructions or paperwork. Staff attempted to catch patient in waiting room but she had already left hospital.

## 2017-06-09 NOTE — ED AVS SNAPSHOT
[facility]    [address] [address]    Phone:  [phone]                                       Julia Fierro   MRN: [id number]    Department:  [facility]   Date of Visit:  6/9/2017           After Visit Summary Signature Page     I have received my discharge instructions, and my questions have been answered. I have discussed any challenges I see with this plan with the nurse or doctor.    ..........................................................................................................................................  Patient/Patient Representative Signature      ..........................................................................................................................................  Patient Representative Print Name and Relationship to Patient    ..................................................               ................................................  Date                                            Time    ..........................................................................................................................................  Reviewed by Signature/Title    ...................................................              ..............................................  Date                                                            Time

## 2017-06-09 NOTE — DISCHARGE INSTRUCTIONS
Back Care Tips    Caring for your back  These are things you can do to prevent a recurrence of acute back pain and to reduce symptoms from chronic back pain:    Maintain a healthy weight. If you are overweight, losing weight will help most types of back pain.    Exercise is an important part of recovery from most types of back pain. The back is supported by the muscles behind and in front of the spine. This means both the back muscles and the abdominal muscles must be strengthened to provide better support for your spine.     Swimming and brisk walking are good overall exercises to improve your fitness level.    Practice safe lifting methods (below).    Practice good posture when sitting, standing and walking. Avoid prolonged sitting. This puts more stress on the lower back than standing or walking.    Wear quality shoes with sufficient arch support. Foot and ankle alignment can affect back symptoms. Women should avoid high heels.    Therapeutic massage can help  relax the back muscles without stretching them.    During the first 24 to 72 hours after an acute injury or flare-up of chronic back pain, apply an ice pack to the painful area for 20 minutes and then remove it for 20 minutes over a period of 60 to 90 minutes or several times a day. As a safety precaution, do not use a heating pad at bedtime. Sleeping on a heating pad can lead to skin burns or tissue damage.    Ice and heat therapies can be alternated.  Medications  Talk to your doctor before using medications, especially if you have other medical problems or are taking other medicines.    You may use acetaminophen or ibuprofen to control pain, unless other pain medicine was prescribed. If you have chronic conditions like diabetes, liver or kidney disease, stomach ulcers or gastrointestinal bleeding, or are taking blood thinners, talk with your doctor before taking any meidcations.    Be careful if you are given prescription pain medicines, narcotics, or  medication for muscle spasm. They can cause drowsiness, affect your coordination, reflexes and judgment. Do not drive or operate heavy machinery.  Lumbar stretch  Here is a simple stretching exercise that will help relax muscle spasm and keep your back more limber. If exercise makes your back pain worse, don t do it.    Lie on your back with your knees bent and both feet on the ground.    Slowly raise your left knee to your chest as you flatten your lower back against the floor. Hold for 5 seconds.    Relax and repeat the exercise with your right knee.    Do 10 of these exercises for each leg.  Safe lifting method    Don t bend over at the waist to lift an object off the floor.  Instead, bend your knees and hips in a squat.     Keep your back and head upright    Hold the object close to your body, directly in front of you.    Straighten your legs to lift the object.     Lower the object to the floor in the reverse fashion.    If you must slide something across the floor, push it.  Posture tips  Sitting  Sit in chairs with straight backs or low-back support. rKeep your k nees lower than your hip, with your feet flat on the floor.  When driving, sit up straight. Adjust the seat forward so you are not leaning toward the steering wheel.  A small pillow or rolled towel behind your lower back may help if you are driving long distances.   Standing  When standing for long periods, shift most of your weight to one leg at a time. Alternate legs every few minutes.   Sleeping  The best way to sleep is on your side with your knees bent. Put a low pillow under your head to support your neck in a neutral spine position. Avoid thick pillows that bend your neck to one side. Put a pillow between your legs to further relax your lower back. If you sleep on your back, put pillows under your knees to support your legs in a slightly flexed position. Use a firm mattress. If your mattress sags, replace it, or use a 1/2-inch plywood board  under the mattress to add support.  Follow-up care  Follow up with your health care provider or as directed by our staff.  If X-rays, a CT scan or an MRI scan were taken, they will be reviewed by a radiologist. You will be notified of any new findings that may affect your care.  Call 911  Seek emergency medical care if any of the following occur:    Trouble breathing    Confusion    Very drowsy or trouble breathing    Fainting or loss of consciousness    Rapid or very slow heart rate    Loss of  bwel or bladder control  When to seek medical care  Call your health care provider if any of the following occur:    Pain becomes worse or spreads to your arms or legs    Weakness or numbness in one or both arms or legs    Numbness in the groin area    3994-2233 [employer] [address] [place] [address], [place] [address]. All rights reserved. This information is not intended as a substitute for professional medical care. Always follow your healthcare professional's instructions.

## 2017-06-09 NOTE — ED AVS SNAPSHOT
[facility]    [address] [address]    Phone:  [phone]                                       Julia Fierro   MRN: [id number]    Department:  [facility]   Date of Visit:  6/9/2017           Patient Information     Date Of Birth          1985        Your diagnoses for this visit were:     Acute left-sided thoracic back pain        You were seen by Keon Camargo MD.      Follow-up Information     Follow up with Samantha Quintana NP.    Specialties:  Family Practice, Psychiatry    Contact information:     [facility]  [address] [address]  [phone]          Discharge Instructions         Back Care Tips    Caring for your back  These are things you can do to prevent a recurrence of acute back pain and to reduce symptoms from chronic back pain:    Maintain a healthy weight. If you are overweight, losing weight will help most types of back pain.    Exercise is an important part of recovery from most types of back pain. The back is supported by the muscles behind and in front of the spine. This means both the back muscles and the abdominal muscles must be strengthened to provide better support for your spine.     Swimming and brisk walking are good overall exercises to improve your fitness level.    Practice safe lifting methods (below).    Practice good posture when sitting, standing and walking. Avoid prolonged sitting. This puts more stress on the lower back than standing or walking.    Wear quality shoes with sufficient arch support. Foot and ankle alignment can affect back symptoms. Women should avoid high heels.    Therapeutic massage can help  relax the back muscles without stretching them.    During the first 24 to 72 hours after an acute injury or flare-up of chronic back pain, apply an ice pack to the painful area for 20 minutes and then remove it for 20 minutes over a period of 60 to 90 minutes or several times a day. As a safety  precaution, do not use a heating pad at bedtime. Sleeping on a heating pad can lead to skin burns or tissue damage.    Ice and heat therapies can be alternated.  Medications  Talk to your doctor before using medications, especially if you have other medical problems or are taking other medicines.    You may use acetaminophen or ibuprofen to control pain, unless other pain medicine was prescribed. If you have chronic conditions like diabetes, liver or kidney disease, stomach ulcers or gastrointestinal bleeding, or are taking blood thinners, talk with your doctor before taking any meidcations.    Be careful if you are given prescription pain medicines, narcotics, or medication for muscle spasm. They can cause drowsiness, affect your coordination, reflexes and judgment. Do not drive or operate heavy machinery.  Lumbar stretch  Here is a simple stretching exercise that will help relax muscle spasm and keep your back more limber. If exercise makes your back pain worse, don t do it.    Lie on your back with your knees bent and both feet on the ground.    Slowly raise your left knee to your chest as you flatten your lower back against the floor. Hold for 5 seconds.    Relax and repeat the exercise with your right knee.    Do 10 of these exercises for each leg.  Safe lifting method    Don t bend over at the waist to lift an object off the floor.  Instead, bend your knees and hips in a squat.     Keep your back and head upright    Hold the object close to your body, directly in front of you.    Straighten your legs to lift the object.     Lower the object to the floor in the reverse fashion.    If you must slide something across the floor, push it.  Posture tips  Sitting  Sit in chairs with straight backs or low-back support. rKeep your k nees lower than your hip, with your feet flat on the floor.  When driving, sit up straight. Adjust the seat forward so you are not leaning toward the steering wheel.  A small pillow or  rolled towel behind your lower back may help if you are driving long distances.   Standing  When standing for long periods, shift most of your weight to one leg at a time. Alternate legs every few minutes.   Sleeping  The best way to sleep is on your side with your knees bent. Put a low pillow under your head to support your neck in a neutral spine position. Avoid thick pillows that bend your neck to one side. Put a pillow between your legs to further relax your lower back. If you sleep on your back, put pillows under your knees to support your legs in a slightly flexed position. Use a firm mattress. If your mattress sags, replace it, or use a 1/2-inch plywood board under the mattress to add support.  Follow-up care  Follow up with your health care provider or as directed by our staff.  If X-rays, a CT scan or an MRI scan were taken, they will be reviewed by a radiologist. You will be notified of any new findings that may affect your care.  Call 911  Seek emergency medical care if any of the following occur:    Trouble breathing    Confusion    Very drowsy or trouble breathing    Fainting or loss of consciousness    Rapid or very slow heart rate    Loss of  bwel or bladder control  When to seek medical care  Call your health care provider if any of the following occur:    Pain becomes worse or spreads to your arms or legs    Weakness or numbness in one or both arms or legs    Numbness in the groin area    0729-4152 [employer]. [address] [place] [address]. All rights reserved. This information is not intended as a substitute for professional medical care. Always follow your healthcare professional's instructions.             Review of your medicines      Our records show that you are taking the medicines listed below. If these are incorrect, please call your family doctor or clinic.        Dose / Directions Last dose taken    prenatal multivitamin  with iron 28-0.8 MG Tabs   Dose:  1  "tablet   Quantity:  30 each        Take 1 tablet by mouth daily   Refills:  0                Procedures and tests performed during your visit     CHLAMYDIA TRACHOMATIS PCR    NEISSERIA GONORRHOEA PCR    UA with Microscopic reflex to Culture    Urine Culture Aerobic Bacterial      Orders Needing Specimen Collection     None      Pending Results     Date and Time Order Name Status Description    2017 0625 Urine Culture Aerobic Bacterial In process             Pending Culture Results     Date and Time Order Name Status Description    2017 0625 Urine Culture Aerobic Bacterial In process             Thank you for choosing [facility]       Thank you for choosing [facility] for your care. Our goal is always to provide you with excellent care. Hearing back from our patients is one way we can continue to improve our services. Please take a few minutes to complete the written survey that you may receive in the mail after you visit with us. Thank you!        [employer] Information     [employer] lets you send messages to your doctor, view your test results, renew your prescriptions, schedule appointments and more. To sign up, go to [email or web address] . Click on \"Log in\" on the left side of the screen, which will take you to the Welcome page. Then click on \"Sign up Now\" on the right side of the page.     You will be asked to enter the access code listed below, as well as some personal information. Please follow the directions to create your username and password.     Your access code is: [email or web address]  Expires: 2017  2:58 PM     Your access code will  in 90 days. If you need help or a new code, please call your [facility] clinic or [phone].        Care EveryWhere ID     This is your Care EveryWhere ID. This could be used by other organizations to access your [facility] medical records  [id number]        After Visit Summary       This is your record. Keep this with you and show to your community pharmacist(s) and " doctor(s) at your next visit.

## 2017-06-09 NOTE — ED NOTES
"Ambulated into ED room 1 independently with c/o mid back pain rated 8/10. States she \"might have a UTI, or might be dehydrated.\" States pain just started this morning. Reports is 16 weeks pregnant and is worried if she doesn't feel well maybe the baby doesn't feel well. Denies fevers. Denies nausea, vomiting, or diarrhea. States, \"I just don't feel well and I've never experienced back pain like this.\" OB called to come check fetal heart tones. Call light within reach.   "

## 2017-06-09 NOTE — ED PROVIDER NOTES
History     Chief Complaint   Patient presents with     Back Pain     states has mid back pain rated 8/10 that started this morning     Patient is a 31 year old female presenting with back pain. The history is provided by the patient.   Back Pain   Location:  Thoracic spine  Quality:  Aching  Pain severity:  Moderate  Onset quality:  Gradual  Chronicity:  New  Associated symptoms: no abdominal pain, no chest pain, no dysuria, no fever, no headaches and no numbness          I have reviewed the Medications, Allergies, Past Medical and Surgical History, and Social History in the Epic system.    Review of Systems   Constitutional: Negative for chills, diaphoresis and fever.   HENT: Negative for voice change.    Eyes: Negative for visual disturbance.   Respiratory: Negative for cough, chest tightness, shortness of breath and wheezing.    Cardiovascular: Negative for chest pain, palpitations and leg swelling.   Gastrointestinal: Negative for abdominal distention, abdominal pain, anal bleeding, blood in stool, nausea and vomiting.   Genitourinary: Negative for decreased urine volume, dysuria, flank pain and hematuria.   Musculoskeletal: Positive for back pain. Negative for arthralgias, gait problem, myalgias, neck pain and neck stiffness.   Skin: Negative for color change, pallor, rash and wound.   Neurological: Negative for dizziness, syncope, light-headedness, numbness and headaches.   Psychiatric/Behavioral: Negative for confusion and suicidal ideas.       Physical Exam   BP: (!) 90/49  Heart Rate: 74  Temp: 96.4  F (35.8  C)  Resp: 16  SpO2: 97 %  Physical Exam   Constitutional: She is oriented to person, place, and time. She appears well-developed and well-nourished.   HENT:   Head: Normocephalic and atraumatic.   Mouth/Throat: No oropharyngeal exudate.   Eyes: Conjunctivae are normal. Pupils are equal, round, and reactive to light.   Neck: Normal range of motion. Neck supple. No JVD present. No tracheal deviation  present. No thyromegaly present.   Cardiovascular: Normal rate, regular rhythm, normal heart sounds and intact distal pulses.  Exam reveals no gallop and no friction rub.    No murmur heard.  Pulmonary/Chest: Effort normal and breath sounds normal. No stridor. No respiratory distress. She has no wheezes. She has no rales. She exhibits no tenderness.   Abdominal: Soft. Bowel sounds are normal. She exhibits no distension and no mass. There is no tenderness. There is no rebound and no guarding.   Genitourinary: There is no rash, tenderness or lesion on the right labia. There is no rash, tenderness or lesion on the left labia. Cervix exhibits discharge. Cervix exhibits no motion tenderness and no friability. Right adnexum displays no mass, no tenderness and no fullness. Left adnexum displays no mass, no tenderness and no fullness.   Musculoskeletal: Normal range of motion. She exhibits no edema or tenderness.   Lymphadenopathy:     She has no cervical adenopathy.   Neurological: She is alert and oriented to person, place, and time.   Skin: Skin is warm and dry. No rash noted. No erythema. No pallor.   Psychiatric: Her behavior is normal.   Nursing note and vitals reviewed.      ED Course     ED Course     Procedures                 Labs Ordered and Resulted from Time of ED Arrival Up to the Time of Departure from the ED   ROUTINE UA WITH MICROSCOPIC REFLEX TO CULTURE - Abnormal; Notable for the following:        Result Value    Leukocyte Esterase Urine Moderate (*)     WBC Urine 3 (*)     Bacteria Urine None (*)     Squamous Epithelial /HPF Urine 7 (*)     Mucous Urine Present (*)     All other components within normal limits   NEISSERIA GONORRHOEAE PCR   CHLAMYDIA TRACHOMATIS PCR       Assessments & Plan (with Medical Decision Making)   Left upper back pain,   15 wk pregnant  UA negative  Pt reported hx of chlamydia infection in the past  pelivc exam : normal , mild discharge  Etiology uncertain, I advised tylenol for  pain control now, if symptoms persisted return to Er for further testing, fu with ob doctor  I have reviewed the nursing notes.    I have reviewed the findings, diagnosis, plan and need for follow up with the patient.    Discharge Medication List as of 6/9/2017  6:35 AM          Final diagnoses:   Acute left-sided thoracic back pain       6/9/2017   [facility]     Keon Camargo MD  06/11/17 0043

## 2017-06-11 LAB
C TRACH DNA SPEC QL NAA+PROBE: NORMAL
N GONORRHOEA DNA SPEC QL NAA+PROBE: NORMAL
SPECIMEN SOURCE: NORMAL
SPECIMEN SOURCE: NORMAL

## 2017-06-11 ASSESSMENT — ENCOUNTER SYMPTOMS
WHEEZING: 0
VOMITING: 0
COUGH: 0
LIGHT-HEADEDNESS: 0
MYALGIAS: 0
DYSURIA: 0
HEMATURIA: 0
NUMBNESS: 0
HEADACHES: 0
CHEST TIGHTNESS: 0
NECK STIFFNESS: 0
CHILLS: 0
PALPITATIONS: 0
FLANK PAIN: 0
SHORTNESS OF BREATH: 0
NAUSEA: 0
ABDOMINAL PAIN: 0
BLOOD IN STOOL: 0
WOUND: 0
ANAL BLEEDING: 0
ARTHRALGIAS: 0
DIAPHORESIS: 0
VOICE CHANGE: 0
CONFUSION: 0
COLOR CHANGE: 0
FEVER: 0
ABDOMINAL DISTENTION: 0
DIZZINESS: 0
BACK PAIN: 1
NECK PAIN: 0

## 2017-06-12 LAB
BACTERIA SPEC CULT: ABNORMAL
MICRO REPORT STATUS: ABNORMAL
SPECIMEN SOURCE: ABNORMAL

## 2017-06-26 ENCOUNTER — TRANSFERRED RECORDS (OUTPATIENT)
Dept: HEALTH INFORMATION MANAGEMENT | Facility: CLINIC | Age: 32
End: 2017-06-26

## 2017-06-26 LAB
HPV ABSTRACT: NORMAL
PAP-ABSTRACT: NORMAL

## 2017-06-28 ENCOUNTER — TRANSFERRED RECORDS (OUTPATIENT)
Dept: HEALTH INFORMATION MANAGEMENT | Facility: HOSPITAL | Age: 32
End: 2017-06-28

## 2017-07-16 ENCOUNTER — TRANSFERRED RECORDS (OUTPATIENT)
Dept: HEALTH INFORMATION MANAGEMENT | Facility: HOSPITAL | Age: 32
End: 2017-07-16

## 2017-07-16 LAB
CREAT SERPL-MCNC: 0.67 MG/DL (ref 0.4–1)
GLUCOSE SERPL-MCNC: 94 MG/DL (ref 70–100)
INR PPP: 0.9 (ref 0.9–1.1)
POTASSIUM SERPL-SCNC: 3.8 MEQ/L (ref 3.4–5.1)

## 2017-07-17 ENCOUNTER — TRANSFERRED RECORDS (OUTPATIENT)
Dept: HEALTH INFORMATION MANAGEMENT | Facility: HOSPITAL | Age: 32
End: 2017-07-17

## 2017-07-22 ENCOUNTER — TRANSFERRED RECORDS (OUTPATIENT)
Dept: HEALTH INFORMATION MANAGEMENT | Facility: HOSPITAL | Age: 32
End: 2017-07-22

## 2017-07-24 ENCOUNTER — TRANSFERRED RECORDS (OUTPATIENT)
Dept: HEALTH INFORMATION MANAGEMENT | Facility: HOSPITAL | Age: 32
End: 2017-07-24

## 2017-07-24 ENCOUNTER — OFFICE VISIT (OUTPATIENT)
Dept: FAMILY MEDICINE | Facility: OTHER | Age: 32
End: 2017-07-24
Attending: NURSE PRACTITIONER
Payer: COMMERCIAL

## 2017-07-24 VITALS
DIASTOLIC BLOOD PRESSURE: 58 MMHG | TEMPERATURE: 97.5 F | BODY MASS INDEX: 39.99 KG/M2 | WEIGHT: 240 LBS | HEART RATE: 60 BPM | SYSTOLIC BLOOD PRESSURE: 98 MMHG | HEIGHT: 65 IN

## 2017-07-24 DIAGNOSIS — I47.10 PAROXYSMAL SUPRAVENTRICULAR TACHYCARDIA (H): Primary | ICD-10-CM

## 2017-07-24 PROBLEM — Z72.0 TOBACCO ABUSE: Status: RESOLVED | Noted: 2017-07-24 | Resolved: 2017-07-24

## 2017-07-24 PROBLEM — Z72.0 TOBACCO ABUSE: Status: ACTIVE | Noted: 2017-07-24

## 2017-07-24 PROCEDURE — 99214 OFFICE O/P EST MOD 30 MIN: CPT | Performed by: NURSE PRACTITIONER

## 2017-07-24 PROCEDURE — 99212 OFFICE O/P EST SF 10 MIN: CPT

## 2017-07-24 RX ORDER — DIGOXIN 250 MCG
0.25 TABLET ORAL PRN
COMMUNITY
Start: 2017-07-17 | End: 2021-06-03

## 2017-07-24 ASSESSMENT — ANXIETY QUESTIONNAIRES
GAD7 TOTAL SCORE: 10
5. BEING SO RESTLESS THAT IT IS HARD TO SIT STILL: SEVERAL DAYS
IF YOU CHECKED OFF ANY PROBLEMS ON THIS QUESTIONNAIRE, HOW DIFFICULT HAVE THESE PROBLEMS MADE IT FOR YOU TO DO YOUR WORK, TAKE CARE OF THINGS AT HOME, OR GET ALONG WITH OTHER PEOPLE: SOMEWHAT DIFFICULT
1. FEELING NERVOUS, ANXIOUS, OR ON EDGE: SEVERAL DAYS
3. WORRYING TOO MUCH ABOUT DIFFERENT THINGS: MORE THAN HALF THE DAYS
6. BECOMING EASILY ANNOYED OR IRRITABLE: NEARLY EVERY DAY
7. FEELING AFRAID AS IF SOMETHING AWFUL MIGHT HAPPEN: SEVERAL DAYS
2. NOT BEING ABLE TO STOP OR CONTROL WORRYING: SEVERAL DAYS

## 2017-07-24 ASSESSMENT — PATIENT HEALTH QUESTIONNAIRE - PHQ9: 5. POOR APPETITE OR OVEREATING: SEVERAL DAYS

## 2017-07-24 ASSESSMENT — PAIN SCALES - GENERAL: PAINLEVEL: NO PAIN (0)

## 2017-07-24 NOTE — MR AVS SNAPSHOT
"              After Visit Summary   7/24/2017    Julia Fierro    MRN: [id number]           Patient Information     Date Of Birth          1985        Visit Information        Provider Department      7/24/2017 10:45 AM Samantha Quintana NP [facility]        Today's Diagnoses     Paroxysmal supraventricular tachycardia (H)    -  1      Care Instructions        Paroxysmal supraventricular tachycardia (H)  - FU with OB as scheduled  - Lanoxin as prescribed  - To ER with concerns        Samantha Quintana NP  [facility]            Follow-ups after your visit        Who to contact     If you have questions or need follow up information about today's clinic visit or your schedule please contact [facility] directly at [phone].  Normal or non-critical lab and imaging results will be communicated to you by [employer]hart, letter or phone within 4 business days after the clinic has received the results. If you do not hear from us within 7 days, please contact the clinic through [employer]hart or phone. If you have a critical or abnormal lab result, we will notify you by phone as soon as possible.  Submit refill requests through [employer] or call your pharmacy and they will forward the refill request to us. Please allow 3 business days for your refill to be completed.          Additional Information About Your Visit        MyChart Information     [employer] lets you send messages to your doctor, view your test results, renew your prescriptions, schedule appointments and more. To sign up, go to [email or web address] . Click on \"Log in\" on the left side of the screen, which will take you to the Welcome page. Then click on \"Sign up Now\" on the right side of the page.     You will be asked to enter the access code listed below, as well as some personal information. Please follow the directions to create your username and password.     Your access code is: [email or web address]  Expires: 10/22/2017 11:58 AM   " "  Your access code will  in 90 days. If you need help or a new code, please call your [facility] clinic or [phone].        Care EveryWhere ID     This is your Care EveryWhere ID. This could be used by other organizations to access your [facility] medical records  [id number]        Your Vitals Were     Pulse Temperature Height BMI (Body Mass Index)          60 97.5  F (36.4  C) (Tympanic) 5' 5\" (1.651 m) 39.94 kg/m2         Blood Pressure from Last 3 Encounters:   17 98/58   17 (!) 90/49   17 112/60    Weight from Last 3 Encounters:   17 240 lb (108.9 kg)   17 230 lb (104.3 kg)   16 248 lb (112.5 kg)              Today, you had the following     No orders found for display       Primary Care Provider Office Phone # Fax #    Samantha JAC Quintana [phone]       [address] [address]        Equal Access to Services     [facility] : Hadii aad ku hadasho Soomaali, waaxda luqadaha, qaybta kaalmada adeegyada, nathaniel ramirez . So [facility] [phone].    ATENCIÓN: Si habla español, tiene a mcgill disposición servicios gratuitos de asistencia lingüística. Julian al [phone].    We comply with applicable federal civil rights laws and [place] laws. We do not discriminate on the basis of race, color, national origin, age, disability sex, sexual orientation or gender identity.            Thank you!     Thank you for choosing [facility]  for your care. Our goal is always to provide you with excellent care. Hearing back from our patients is one way we can continue to improve our services. Please take a few minutes to complete the written survey that you may receive in the mail after your visit with us. Thank you!             Your Updated Medication List - Protect others around you: Learn how to safely use, store and throw away your medicines at www.disposemymeds.org.          This list is accurate as of: " 7/24/17 11:58 AM.  Always use your most recent med list.                   Brand Name Dispense Instructions for use Diagnosis    digoxin 250 MCG tablet    LANOXIN     Take 0.25 mg by mouth as needed        MAGNESIUM PO      Take 250 mg by mouth 2 times daily        prenatal multivitamin  with iron 28-0.8 MG Tabs     30 each    Take 1 tablet by mouth daily

## 2017-07-24 NOTE — PATIENT INSTRUCTIONS
Paroxysmal supraventricular tachycardia (H)  - FU with OB as scheduled  - Lanoxin as prescribed  - To ER with concerns        Samantha Quintana NP  [facility]

## 2017-07-24 NOTE — PROGRESS NOTES
SUBJECTIVE:                                                    Julia Fierro is a 31 year old female who presents to clinic today for the following health issues:      Julia is here for a visit to discuss a recent diagnosis of SVT.  She went to ER at [facility] - 7/17/17, transferred to [facility] with a HR of 250, via EMS.  She spent one night, was treated with lanoxin, and discharged the next day.  Since that time SVT has not occurred.    Cardiology consultation on file from [facility]    Pregnant, EDC 12/7/17 - sees Dr. Snider today at [facility].    She is off antidepressants, mood is stable, no depression    See below - DC summary from [facility], and cardiac studies.      Discharge Summaries  - in this encounter    Paresh Aguilera MD - 07/17/2017 2:27 PM CDT  Formatting of this note may be different from the original.  7/17/2017 HOSPITAL DISCHARGE SUMMARY Paresh Aguilera MD    Patient Name: Julia Fierro MRN: [id number]   YOB: 1985 Age: 31 year old   Primary Physician: Samantha Quintana RN, CNP Phone: [phone]   Admitting Physician: Christopher Pack MD Admission Date:7/17/2017   Discharging Physician: Paresh Aguilera MD Discharge Date: 07/17/17      Discharge Diagnoses:   Principal Problem:  SVT (supraventricular tachycardia) (HCC)    Active Problems:  Morbid Obesity (HCC)  Depression  Supervision of normal intrauterine pregnancy in multigravida in second trimester    Consultants:   IP CONSULT TO CARDIOLOGY    Procedures:   Echocardiogram:  Interpretation Summary   Left ventricle is normal in size with normal function. EF is 60-65%. Wall motion is normal   Right ventricle is normal in size with normal function.   Normal atrial size.   No significant valvular stenosis or regurgitation seen.   No pericardial effusion.   RVSP is estimated at 10mmHg. No pulmonary hypertension.   IVC is normal in size with greater than 50% collapse indicating normal right atrial pressure of 3mmHg.   No prior study  available for comparison.     Hospital Course:   Julia Fierro is a 31 year old female Who developed her first ever episode of supraventricular tachycardia at home on the evening of July 16. See Dr. Pack's H&P for details. She remained in NSR while here. Dr. Yuri Chowdary of the cardiology service saw her in consultation. He recommended against starting suppressive medication. The echocardiogram was normal. Her magnesium level on admission was low. This was supplemented. I am sending her out on bid oral magnesium. I also gave her a small supply (#4tablets) of digoxin 250 mcg, and told her to take one tablet for recurrent tachycardia > 180. Her caffeine intake history was variable - depending on who asked. In any case, I told her to avoid caffeine in all forms. She will follow-up with Horace Snider as scheduled on July 24.    Julia was discharged from [place], [place] to Home .   Julia was seen and examined on the date of discharge. Pertinent history and exam findings at discharge include Normal exam and vital signs.    DischargeVital Signs:  Vitals:   07/17/17 1218   BP: 108/54   Pulse: 68   Temp: 36.9  C (98.4  F)   TempSrc: Oral   Resp: 16   Height:   Weight:   SpO2: 98%   BMI (Calculated):     Condition at Discharge: stable.       Current Discharge Medication List     New Prescriptions   Details   digoxin 250 MCG tablet  Commonly known as: LANOXIN      Dose: 0.25 mg  0.25 mg, Oral, SEE ADMINISTRATION INSTRUCTIONS    Magnesium 250 MG      Dose: 250 mg  250 mg, Oral, 2 TIMES DAILY      Continued   Details   Prenatal Vitamins 28-0.8 MG Tablet tablet      TK 1 T PO QD    TYLENOL 325 MG Capsule  Generic drug: Acetaminophen      Oral      You might also be taking other medications not listed above. If you have questions about any of your other medications, talk to the person who prescribed them or your Primary Care Provider.           Follow Up Instructions:  Horace Snider MD  [address]  [facility] [address]  [phone]    as scheduled on July 24. Please check potassium and magnesium levels.    Discharge Procedure Orders  NEW Diet/NPO Status   Order Specific Question Answer Comments   Diet type General Adult   Additional Options: No Caffeine     Activity as tolerated     Code Status:  Full Code    Recent Labs:  In Process Labs (336h ago through future)   None       Recent Results (from the past 24 hour(s))   BASIC METABOLIC PANEL   Result Value Ref Range   Sodium 139 134 - 143 mEq/L   Potassium 3.8 3.4 - 5.1 mEq/L   Chloride 107 99 - 110 mEq/L   Carbon Dioxide 20 19 - 29 mEq/L   Anion Gap 12.0 3.0 - 15.0 mEq/L   Blood Urea Nitrogen 8 5 - 24 mg/dL   Creatinine 0.67 0.40 - 1.00 mg/dL   Glomerular Filtration Rate >60 >60 mL/min/1.73 m*2   Calcium 9.3 8.4 - 10.5 mg/dL   Glucose 94 70 - 100 mg/dL   Narrative   Current ADA criteria for Glucose:   Normal: 70-99 mg/dL  Impaired Fasting Glucose: 100-125 mg/dL  Diabetes Mellitus: at or above 126 mg/dL  The diagnosis of diabetes must be confirmed on a subsequent day by measuring Fasting Plasma Glucose, 2-hr PG or random plasma glucose (if symptoms are present).   MAGNESIUM   Result Value Ref Range   Magnesium 1.3 (L) 1.5 - 2.2 mEq/L   PHOSPHORUS   Result Value Ref Range   Phosphorus 4.0 2.5 - 4.6 mg/dL   TROPONIN I   Result Value Ref Range   Troponin I 0.000 0 - 0.028 ng/mL   T4, FREE   Result Value Ref Range   T4, Free 1.0 0.7 - 1.7 ng/dL   THYROID STIMULATING HORMONE   Result Value Ref Range   Thyroid Stimulating Hormone 0.78 0.40 - 3.99 uIU/mL   HEMOGRAM/DIFFERENTIAL   Result Value Ref Range   WBC 8.4 3.4 - 10.7 10*9/L   RBC 3.42 (L) 3.70 - 5.40 10*12/L   HGB 11.0 (L) 11.7 - 15.8 g/dL   HCT 31.1 (L) 33.2 - 48.0 %   MCV 90.9 82.0 - 99.0 fL   MCH 32.0 27.0 - 34.0 pg   MCHC 35.3 32.0 - 35.7 g/dL   RDW 12.2 11.0 - 15.0 %    150 - 400 10*9/L   Neutrophils % 68.8 %   Lymphocytes % 22.7 %   Monocytes % 6.5 %   Eosinophils % 1.8 %   Basophils %  0.2 %   Neutrophils Absolute 5.8 1.7 - 8.5 10*9/L   Lymphocytes Absolute 1.9 0.9 - 3.6 10*9/L   Monocytes Absolute 0.5 0.3 - 1.0 10*9/L   Eosinophils Absolute 0.2 0.0 - 0.6 10*9/L   Basophils Absolute 0.0 0.0 - 0.3 10*9/L   PROTIME   Result Value Ref Range   INR 0.9 0.9 - 1.1   Protime 12.0 (L) 12.1 - 14.4 sec   Narrative   SUGGESTED THERAPEUTIC INR RANGES FOR ORAL ANTICOAGULANT THERAPY    CATEGORY INR    PROPHYLAXIS 2.0-3.0  TREAT THROMBOSIS OR EMBOLISM 2.0-3.0  PROSTHETIC HEART VALVE 2.5-3.5   HCG, SERUM QUANTITATIVE   Result Value Ref Range   hCG, Serum Quantitative 10,902 mIU/mL   Narrative   hCG Quantitative Reference Intervals:    Negative: <5 mIU/mL  Indeterminate: 5-25 mIU/mL  Positive: >25 mIU/mL   URINALYSIS, REFLEX TO MICROSCOPIC   Result Value Ref Range   Color Yellow Yellow   Appearance Clear Clear   UA Specific Gravity <=1.005 1.003 - 1.035   pH, Urine 5.5 5.0 - 8.0   Glucose Negative Negative   Ketones Negative Negative   Protein, Urine Negative Negative   Nitrites Negative Negative   Leukocyte Esterase Small (A) Negative   URINE MICROSCOPIC EXAMINATION   Result Value Ref Range   WBC's, Urine 3-8 0 - 8 /HPF   RBC's, Urine 0-3 0 - 3 /HPF   Squamous Epithelial Cells, Urine Many /LPF   COMPREHENSIVE METABOLIC PANEL   Result Value Ref Range   Sodium 135 134 - 143 mEq/L   Potassium 3.6 3.4 - 5.1 mEq/L   Chloride 109 99 - 110 mEq/L   Carbon Dioxide 19 19 - 29 mEq/L   Anion Gap 7.0 3.0 - 15.0 mEq/L   Blood Urea Nitrogen 6 5 - 24 mg/dL   Creatinine 0.59 0.40 - 1.00 mg/dL   Glomerular Filtration Rate >60 >60 mL/min/1.73 m*2   Calcium 8.3 (L) 8.4 - 10.5 mg/dL   Glucose 98 70 - 100 mg/dL   Protein, Total 5.9 (L) 6.0 - 8.0 g/dL   Albumin 2.4 (L) 3.5 - 5.0 g/dL   Alkaline Phosphatase 90 40 - 150 IU/L   Aspartate Aminotransferase 13 10 - 40 IU/L   Alanine Aminotransferase 17 6 - 31 IU/L   Bilirubin, Total 0.2 0.2 - 1.2 mg/dL   Narrative   Current ADA criteria for Glucose:   Normal: 70-99 mg/dL  Impaired Fasting  Glucose: 100-125 mg/dL  Diabetes Mellitus: at or above 126 mg/dL  The diagnosis of diabetes must be confirmed on a subsequent day by measuring Fasting Plasma Glucose, 2-hr PG or random plasma glucose (if symptoms are present).   MAGNESIUM   Result Value Ref Range   Magnesium 1.2 (L) 1.5 - 2.2 mEq/L   PHOSPHORUS   Result Value Ref Range   Phosphorus 3.5 2.5 - 4.6 mg/dL     Paresh Aguilera MD  Total time spent for discharge on date of discharge: Greater than 30 minutes.    Medications at Time of Discharge  - as of this encounter    Medication Sig. Disp. Refills Start Date End Date   Magnesium 250 MG   Take 1 Tab by mouth two times a day. 60 Tab   1 07/17/2017     digoxin (LANOXIN) 250 MCG tablet   Take one tablet daily if needed for rapid heart rate > 180 pulse. 4 Tab   0 07/17/2017     Prenatal Vit-Fe Fumarate-FA (PRENATAL VITAMINS) 28-0.8 MG OR Tab tablet   TK 1 T PO QD   0 04/05/2017     Acetaminophen (TYLENOL) 325 MG Cap   Take by mouth. 120 Cap     05/15/2017     Ordered Prescriptions  - in this encounter    Prescription Sig. Disp. Refills Start Date End Date   digoxin (LANOXIN) 250 MCG tablet   Take one tablet daily if needed for rapid heart rate > 180 pulse. 4 Tab   0 07/17/2017     Magnesium 250 MG   Take 1 Tab by mouth two times a day. 60 Tab   1 07/17/2017     Discharge Disposition  - in this encounter    Disposition Code Departure Means Destination   Home and/or Self Care         Progress Notes  - in this encounter    Table of Contents for Progress Notes  Kathia Iniguez RN - 07/17/2017 4:46 PM CDT  Martha Dacosta - 07/17/2017 11:06 AM CDT  Kadi Penn RN - 07/17/2017 2:32 AM CDT     Kathia Iniguez RN - 07/17/2017 4:46 PM CDT  Patient discharged to Home and/or SelfCare via wheelchair by [employer].    Discharge instructions reviewed with Pt. PIV removed per order. All belongings sent with pt. Denies questions.         EKG 12-LEAD7/17/2017  [facility]  Component Name Value Ref Range    Ventricular Rate 68 BPM    Atrial Rate 68 BPM    P-R Interval 200 ms   QRS Duration 94 ms    ms   QTc 404 ms   P Axis 21 degrees    R Axis 67 degrees    T Axis 42 degrees    Result Narrative   Confirming Doc RUDI STOKES MD  Normal sinus rhythm with sinus arrhythmia  Normal ECG  When compared with ECG of 2017 22:32,  No significant change was found           Component Name Value Ref Range   LVEF Range Percent Qualitative ejection fraction is 60-65% (normal).     RESULT   [address] [address]  Phone [phone]  Fax [phone]                                                                 Transthoracic Echocardiogram Report  Name: YURIDIA VELA             Study Date: 2017  MRN: [id number]                        Performing Location: [facility] IP  : 1985                    Gender: Female  Height: 65 in                      Age: 31 yrs  Weight: 243 lb                     BSA: 2.1 m2  BP: 118/79 mmHg                    HR: 69  Ordering Physician: ZANE WOODS  Referring Physician: ZANE WOODS  Performed By: Martha Dacosta RCS  Reason For Study: Tachycardia      Interpretation Summary  Left ventricle is normal in size with normal function. EF is 60-65%. Wall motion is normal  Right ventricle is normal in size with normal function.  Normal atrial size.  No significant valvular stenosis or regurgitation seen.  No pericardial effusion.  RVSP is estimated at 10mmHg. No pulmonary hypertension.  IVC is normal in size with greater than 50% collapse indicating normal right atrial pressure of 3mmHg.  No prior study available for comparison.      Left Ventricle  The left ventricular systolic function is normal. Qualitative ejection fraction is 60-65% (normal). The left ventricle is normal size. There is normal left  ventricular wall thickness. Wall motion is normal.    Right Ventricle  Normal right ventricular systolic function. The right ventricular cavity  size is qualitatively normal.    Atria  Left atrium is normal in size by volume. Right atrium is quantitatively normal in size.    Diastolic Function  Left ventricular diastolic function is normal.      Aortic Valve  The aortic valve is not well visualized and the cusp number cannot accurately be determined. No significant aortic insufficiency. There is no aortic stenosis.    Mitral Valve  The mitral valve anatomy is normal. There is trace mitral regurgitation. No mitral valve stenosis.    Tricuspid Valve  The tricuspid valve anatomy is normal. Trace or physiologic tricuspid regurgitation. There is no tricuspid stenosis.    Pulmonic Valve  Pulmonic valve not well visualized. No significant pulmonic regurgitation.    Pericardium/Pleura  There is no pericardial effusion.    Vessels  The aortic root is normal in size for body surface area. There is no echocardiographic evidence of aortic coarctation present.      Septae  There is no evidence of an atrial septal defect by color flow doppler but resolution does not allow assessment for a patent foramen ovale.    Hemodynamics  Calculated RV systolic pressure was estimated at 10 mm Hg. No pulmonary hypertension. Inferior vena cava size normal and collapsibility > 50% normal indicating  normal right atrial pressure (3 mm Hg).    Procedure Details  A two-dimensional transthoracic echocardiogram with color flow and Doppler was performed.    MMode/2D Measurements & Calculations  IVSd: 0.76 cm                                                                           LVIDd: 4.6 cm  LVPWd: 1.2 cm                                                                           LVIDs: 2.3 cm  Ao root diam: 3.1 cm                                                                    IVS/LVPW: 0.62                    _________________________________________________________________________________________________________________________________  LAV(MOD-bp): 64.9  ml                                                                    EDV(Teich): 97.6 ml                                                                                          ESV(Teich): 18.7 ml  LAV(MOD-bp) index: 30.2 ml/m2  LAV(MOD-sp2): 61.9 ml  LAV(MOD-sp4): 64.1 ml                  _________________________________________________________________________________________________________________________________  LV mass(C)d: 157.3 grams                                                                LVOT diam: 2.1 cm    LV mass(C)dI: 73.3 grams/m2                  _________________________________________________________________________________________________________________________________    EDV(MOD-sp2): 177.5 ml                                                                  EDV(sp2-el): 181.1 ml                                                                                          ESV(MOD-sp2): 48.9 ml                                                                                          EF(MOD-sp2): 72.4 %                  _________________________________________________________________________________________________________________________________    >  RAP systole: 3.0 mmHg                                                                   RVDd (basal 4CH): 3.4 cm    Time Measurements  MV dec time: 0.24 sec    Doppler Measurements & Calculations  MV E max kris: 124.8 cm/sec                                                           Lat A' kris: 7.7 cm/sec  MV A max kris: 71.4 cm/sec                                                            Lat Peak E' Kris: 12.7 cm/sec  MV E/A: 1.7                                                                          Lat E/e' ratio: 9.9  Med Peak A' Kris: 6.1 cm/sec                                                          Med Peak E' Kris: 10.8 cm/sec                                                                                       Med E/e' ratio:  11.6                                                                                       Average E/e': 10.7                  _________________________________________________________________________________________________________________________________    Ao V2 max: 200.7 cm/sec                                                              LV V1 max: 125.0 cm/sec  Ao max P.5 mmHg                                                                 LV V1 max P.2 mmHg  Ao mean P.4 mmHg                                                                 LV V1 mean PG: 3.1 mmHg  Ao V2 VTI: 40.9 cm                                                                   LV V1 VTI: 28.6 cm  GOMEZ(I,D): 2.4 cm2    GOMEZ(V,D): 2.1 cm2                  _________________________________________________________________________________________________________________________________  SV(LVOT): 98.1 ml                                                                    TR max stanislav: 129.5 cm/sec                                                                                       TR max P.7 mmHg                                                                                       RVSP(TR): 9.7 mmHg                    _________________________________________________________________________________________________________________________________  >  GOMEZ (I,D) indexed (cm2/m2): 1.1                                                      Dimensionless Index: 0.70                    _________________________________________________________________________________________________________________________________  >  SV-LVOT (indexed): 45.7              _________________________________________________________________________________________________________________________________________________    Interpreting Physician:Electronically signed by: Tra Champion MD on 2017 01:33 PM  Location: [facility]              Problem list and  histories reviewed & adjusted, as indicated.  Additional history: as documented    Patient Active Problem List   Diagnosis     Anxiety     Moderate episode of recurrent major depressive disorder (H)     Panic attacks     Paroxysmal supraventricular tachycardia (H)     Past Surgical History:   Procedure Laterality Date     EXTRACTION(S) DENTAL      wisdom teeth extracted     LAPAROSCOPY  2005    Endometriosis       Social History   Substance Use Topics     Smoking status: Current Every Day Smoker     Types: Cigarettes     Smokeless tobacco: Never Used      Comment: tried to quit yes, yr quit 2008, no passive exposure     Alcohol use 0.0 oz/week     0 Standard drinks or equivalent per week      Comment: occasionally     Family History   Problem Relation Age of Onset     Arthritis Mother      Coronary Artery Disease Mother      Hyperlipidemia Father      Hypertension Father      Other Cancer Father      DIABETES Paternal Grandmother          Current Outpatient Prescriptions   Medication Sig Dispense Refill     MAGNESIUM PO Take 250 mg by mouth 2 times daily       digoxin (LANOXIN) 250 MCG tablet Take 0.25 mg by mouth as needed       Prenatal Vit-Fe Fumarate-FA (PRENATAL MULTIVITAMIN  WITH IRON) 28-0.8 MG TABS Take 1 tablet by mouth daily 30 each 0     Allergies   Allergen Reactions     Sertraline Hcl      Lightheaded  Shakes        Shakes       Penicillins Rash     Recent Labs   Lab Test 05/13/17 04/18/17   1600 03/31/17   A1C   --   4.9   --    ALT   --    --   48*   CR  0.70   --   0.66   POTASSIUM  3.7   --   3.6   TSH   --   0.94   --       BP Readings from Last 3 Encounters:   07/24/17 98/58   06/09/17 (!) 90/49   04/18/17 112/60    Wt Readings from Last 3 Encounters:   07/24/17 240 lb (108.9 kg)   04/18/17 230 lb (104.3 kg)   12/19/16 248 lb (112.5 kg)                  Labs reviewed in EPIC          Reviewed and updated as needed this visit by clinical staffTobacco  Allergies  Meds       Reviewed and updated  "as needed this visit by Provider         ROS:  Constitutional, HEENT, cardiovascular, pulmonary, gi and gu systems are negative, except as otherwise noted.      OBJECTIVE:   BP 98/58  Pulse 60  Temp 97.5  F (36.4  C) (Tympanic)  Ht 5' 5\" (1.651 m)  Wt 240 lb (108.9 kg)  BMI 39.94 kg/m2  Body mass index is 39.94 kg/(m^2).     GENERAL: healthy, alert and no distress  EYES: Eyes grossly normal to inspection, PERRL and conjunctivae and sclerae normal  HENT: ear canals and TM's normal, nose and mouth without ulcers or lesions  NECK: no adenopathy, no asymmetry, masses, or scars and thyroid normal to palpation  RESP: lungs clear to auscultation - no rales, rhonchi or wheezes  CV: regular rate and rhythm, normal S1 S2, no S3 or S4, no murmur, click or rub, no peripheral edema and peripheral pulses strong  ABDOMEN: soft, nontender, no hepatosplenomegaly, no masses and bowel sounds normal  MS: no gross musculoskeletal defects noted, no edema  SKIN: no suspicious lesions or rashes  PSYCH: mentation appears normal, affect normal/bright      ASSESSMENT/PLAN:         Paroxysmal supraventricular tachycardia (H)  - FU with OB as scheduled  - Lanoxin as prescribed  - To ER with concerns        Samantha Quintana NP  [facility]  "

## 2017-07-24 NOTE — NURSING NOTE
"Chief Complaint   Patient presents with     Hospital F/U       Initial BP 98/58  Pulse 60  Temp 97.5  F (36.4  C) (Tympanic)  Ht 5' 5\" (1.651 m)  Wt 240 lb (108.9 kg)  BMI 39.94 kg/m2 Estimated body mass index is 39.94 kg/(m^2) as calculated from the following:    Height as of this encounter: 5' 5\" (1.651 m).    Weight as of this encounter: 240 lb (108.9 kg).  Medication Reconciliation: complete     JOSEPH HALE      "

## 2017-07-25 ASSESSMENT — PATIENT HEALTH QUESTIONNAIRE - PHQ9: SUM OF ALL RESPONSES TO PHQ QUESTIONS 1-9: 9

## 2017-07-25 ASSESSMENT — ANXIETY QUESTIONNAIRES: GAD7 TOTAL SCORE: 10

## 2017-09-15 LAB
C TRACH DNA SPEC QL PROBE+SIG AMP: POSITIVE
N GONORRHOEA DNA SPEC QL PROBE+SIG AMP: NEGATIVE
SPECIMEN DESCRIP: NORMAL
SPECIMEN DESCRIPTION: NORMAL

## 2017-09-20 ENCOUNTER — TRANSFERRED RECORDS (OUTPATIENT)
Dept: HEALTH INFORMATION MANAGEMENT | Facility: HOSPITAL | Age: 32
End: 2017-09-20

## 2017-09-30 ENCOUNTER — TRANSFERRED RECORDS (OUTPATIENT)
Dept: HEALTH INFORMATION MANAGEMENT | Facility: HOSPITAL | Age: 32
End: 2017-09-30

## 2017-10-18 ENCOUNTER — TRANSFERRED RECORDS (OUTPATIENT)
Dept: HEALTH INFORMATION MANAGEMENT | Facility: HOSPITAL | Age: 32
End: 2017-10-18

## 2017-10-30 ENCOUNTER — TRANSFERRED RECORDS (OUTPATIENT)
Dept: HEALTH INFORMATION MANAGEMENT | Facility: HOSPITAL | Age: 32
End: 2017-10-30

## 2017-11-03 ENCOUNTER — TRANSFERRED RECORDS (OUTPATIENT)
Dept: HEALTH INFORMATION MANAGEMENT | Facility: HOSPITAL | Age: 32
End: 2017-11-03

## 2017-11-20 ENCOUNTER — TRANSFERRED RECORDS (OUTPATIENT)
Dept: HEALTH INFORMATION MANAGEMENT | Facility: HOSPITAL | Age: 32
End: 2017-11-20

## 2017-11-23 ENCOUNTER — TRANSFERRED RECORDS (OUTPATIENT)
Dept: HEALTH INFORMATION MANAGEMENT | Facility: HOSPITAL | Age: 32
End: 2017-11-23

## 2017-11-24 ENCOUNTER — TRANSFERRED RECORDS (OUTPATIENT)
Dept: HEALTH INFORMATION MANAGEMENT | Facility: HOSPITAL | Age: 32
End: 2017-11-24

## 2017-12-06 ENCOUNTER — OFFICE VISIT (OUTPATIENT)
Dept: FAMILY MEDICINE | Facility: OTHER | Age: 32
End: 2017-12-06
Attending: NURSE PRACTITIONER
Payer: COMMERCIAL

## 2017-12-06 VITALS
DIASTOLIC BLOOD PRESSURE: 62 MMHG | RESPIRATION RATE: 14 BRPM | SYSTOLIC BLOOD PRESSURE: 96 MMHG | TEMPERATURE: 97.9 F | BODY MASS INDEX: 41.15 KG/M2 | HEART RATE: 60 BPM | WEIGHT: 247 LBS | HEIGHT: 65 IN

## 2017-12-06 DIAGNOSIS — F33.1 MODERATE EPISODE OF RECURRENT MAJOR DEPRESSIVE DISORDER (H): Primary | ICD-10-CM

## 2017-12-06 PROCEDURE — 99212 OFFICE O/P EST SF 10 MIN: CPT

## 2017-12-06 PROCEDURE — 99213 OFFICE O/P EST LOW 20 MIN: CPT | Performed by: NURSE PRACTITIONER

## 2017-12-06 ASSESSMENT — ANXIETY QUESTIONNAIRES
GAD7 TOTAL SCORE: 3
3. WORRYING TOO MUCH ABOUT DIFFERENT THINGS: SEVERAL DAYS
5. BEING SO RESTLESS THAT IT IS HARD TO SIT STILL: NOT AT ALL
6. BECOMING EASILY ANNOYED OR IRRITABLE: SEVERAL DAYS
7. FEELING AFRAID AS IF SOMETHING AWFUL MIGHT HAPPEN: NOT AT ALL
2. NOT BEING ABLE TO STOP OR CONTROL WORRYING: SEVERAL DAYS
1. FEELING NERVOUS, ANXIOUS, OR ON EDGE: NOT AT ALL
IF YOU CHECKED OFF ANY PROBLEMS ON THIS QUESTIONNAIRE, HOW DIFFICULT HAVE THESE PROBLEMS MADE IT FOR YOU TO DO YOUR WORK, TAKE CARE OF THINGS AT HOME, OR GET ALONG WITH OTHER PEOPLE: SOMEWHAT DIFFICULT

## 2017-12-06 ASSESSMENT — PATIENT HEALTH QUESTIONNAIRE - PHQ9
SUM OF ALL RESPONSES TO PHQ QUESTIONS 1-9: 5
5. POOR APPETITE OR OVEREATING: NOT AT ALL

## 2017-12-06 NOTE — LETTER
My Depression Action Plan  Name: Julia Fierro   Date of Birth 1985  Date: 12/6/2017    My doctor: Samantha Quintana   My clinic: [facility]  [address] [place] [address] [address]  [place] [address]  [phone]          GREEN    ZONE   Good Control    What it looks like:     Things are going generally well. You have normal up s and down s. You may even feel depressed from time to time, but bad moods usually last less than a day.   What you need to do:  1. Continue to care for yourself (see self care plan)  2. Check your depression survival kit and update it as needed  3. Follow your physician s recommendations including any medication.  4. Do not stop taking medication unless you consult with your physician first.           YELLOW         ZONE Getting Worse    What it looks like:     Depression is starting to interfere with your life.     It may be hard to get out of bed; you may be starting to isolate yourself from others.    Symptoms of depression are starting to last most all day and this has happened for several days.     You may have suicidal thoughts but they are not constant.   What you need to do:     1. Call your care team, your response to treatment will improve if you keep your care team informed of your progress. Yellow periods are signs an adjustment may need to be made.     2. Continue your self-care, even if you have to fake it!    3. Talk to someone in your support network    4. Open up your depression survival kit           RED    ZONE Medical Alert - Get Help    What it looks like:     Depression is seriously interfering with your life.     You may experience these or other symptoms: You can t get out of bed most days, can t work or engage in other necessary activities, you have trouble taking care of basic hygiene, or basic responsibilities, thoughts of suicide or death that will not go away, self-injurious behavior.     What you need to do:  1. Call your care team and  request a same-day appointment. If they are not available (weekends or after hours) call your local crisis line, emergency room or 911.      Electronically signed by: Samantha Quintana, December 6, 2017    Depression Self Care Plan / Survival Kit    Self-Care for Depression  Here s the deal. Your body and mind are really not as separate as most people think.  What you do and think affects how you feel and how you feel influences what you do and think. This means if you do things that people who feel good do, it will help you feel better.  Sometimes this is all it takes.  There is also a place for medication and therapy depending on how severe your depression is, so be sure to consult with your medical provider and/ or Behavioral Health Consultant if your symptoms are worsening or not improving.     In order to better manage my stress, I will:    Exercise  Get some form of exercise, every day. This will help reduce pain and release endorphins, the  feel good  chemicals in your brain. This is almost as good as taking antidepressants!  This is not the same as joining a gym and then never going! (they count on that by the way ) It can be as simple as just going for a walk or doing some gardening, anything that will get you moving.      Hygiene   Maintain good hygiene (Get out of bed in the morning, Make your bed, Brush your teeth, Take a shower, and Get dressed like you were going to work, even if you are unemployed).  If your clothes don't fit try to get ones that do.    Diet  I will strive to eat foods that are good for me, drink plenty of water, and avoid excessive sugar, caffeine, alcohol, and other mood-altering substances.  Some foods that are helpful in depression are: complex carbohydrates, B vitamins, flaxseed, fish or fish oil, fresh fruits and vegetables.    Psychotherapy  I agree to participate in Individual Therapy (if recommended).    Medication  If prescribed medications, I agree to take them.  Missing doses can  result in serious side effects.  I understand that drinking alcohol, or other illicit drug use, may cause potential side effects.  I will not stop my medication abruptly without first discussing it with my provider.    Staying Connected With Others  I will stay in touch with my friends, family members, and my primary care provider/team.    Use your imagination  Be creative.  We all have a creative side; it doesn t matter if it s oil painting, sand castles, or mud pies! This will also kick up the endorphins.    Witness Beauty  (AKA stop and smell the roses) Take a look outside, even in mid-winter. Notice colors, textures. Watch the squirrels and birds.     Service to others  Be of service to others.  There is always someone else in need.  By helping others we can  get out of ourselves  and remember the really important things.  This also provides opportunities for practicing all the other parts of the program.    Humor  Laugh and be silly!  Adjust your TV habits for less news and crime-drama and more comedy.    Control your stress  Try breathing deep, massage therapy, biofeedback, and meditation. Find time to relax each day.     My support system    Clinic Contact:  Phone number:    Contact 1:  Phone number:    Contact 2:  Phone number:    Gnosticism/:  Phone number:    Therapist:  Phone number:    Local crisis center:    Phone number:    Other community support:  Phone number:

## 2017-12-06 NOTE — PROGRESS NOTES
SUBJECTIVE:   Julia Fierro is a 31 year old female who presents to clinic today for the following health issues:      Depression and Anxiety Follow-Up    Status since last visit: Improved patient is doing very well    Other associated symptoms:None    Complicating factors:     Significant life event: Yes-  Birth of baby     Current substance abuse: None        PHQ-9 Score and MyChart F/U Questions 12/19/2016 2/13/2017 7/24/2017   Total Score 12 15 9   Q9: Suicide Ideation Not at all Not at all Not at all     KULDIP-7 SCORE 12/19/2016 2/13/2017 7/24/2017   Total Score 12 19 10     In the past two weeks have you had thoughts of suicide or self-harm?  No.    Do you have concerns about your personal safety or the safety of others?   No    PHQ-9  English  PHQ-9   Any Language  GAD7  Suicide Assessment Five-step Evaluation and Treatment (SAFE-T)      Amount of exercise or physical activity: None    Problems taking medications regularly: No    Medication side effects: none    Diet: regular (no restrictions)        Problem list and histories reviewed & adjusted, as indicated.  Additional history: as documented    Patient Active Problem List   Diagnosis     Anxiety     Moderate episode of recurrent major depressive disorder (H)     Panic attacks     Paroxysmal supraventricular tachycardia (H)     Past Surgical History:   Procedure Laterality Date     EXTRACTION(S) DENTAL      wisdom teeth extracted     LAPAROSCOPY  2005    Endometriosis       Social History   Substance Use Topics     Smoking status: Former Smoker     Types: Cigarettes     Smokeless tobacco: Never Used      Comment: tried to quit yes, yr quit 2008, no passive exposure     Alcohol use 0.0 oz/week     0 Standard drinks or equivalent per week      Comment: occasionally     Family History   Problem Relation Age of Onset     Arthritis Mother      Coronary Artery Disease Mother      Hyperlipidemia Father      Hypertension Father      Other Cancer Father       "DIABETES Paternal Grandmother          Current Outpatient Prescriptions   Medication Sig Dispense Refill     Acetaminophen (TYLENOL PO)        MAGNESIUM PO Take 250 mg by mouth 2 times daily       digoxin (LANOXIN) 250 MCG tablet Take 0.25 mg by mouth as needed       Prenatal Vit-Fe Fumarate-FA (PRENATAL MULTIVITAMIN  WITH IRON) 28-0.8 MG TABS Take 1 tablet by mouth daily 30 each 0     Allergies   Allergen Reactions     Sertraline Hcl      Lightheaded  Shakes        Shakes       Penicillins Rash     Recent Labs   Lab Test 07/16/17 05/13/17 04/18/17   1600 03/31/17   A1C   --    --   4.9   --    ALT   --    --    --   48*   CR  0.67  0.70   --   0.66   POTASSIUM  3.8  3.7   --   3.6   TSH   --    --   0.94   --       BP Readings from Last 3 Encounters:   12/06/17 96/62   07/24/17 98/58   06/09/17 (!) 90/49    Wt Readings from Last 3 Encounters:   12/06/17 247 lb (112 kg)   07/24/17 240 lb (108.9 kg)   04/18/17 230 lb (104.3 kg)                  Labs reviewed in EPIC          Reviewed and updated as needed this visit by clinical staffTobacco  Allergies  Meds  Soc Hx      Reviewed and updated as needed this visit by Provider         ROS:  Constitutional, HEENT, cardiovascular, pulmonary, gi and gu systems are negative, except as otherwise noted.      OBJECTIVE:   BP 96/62 (BP Location: Right arm, Patient Position: Sitting, Cuff Size: Adult Large)  Pulse 60  Temp 97.9  F (36.6  C) (Tympanic)  Resp 14  Ht 5' 5\" (1.651 m)  Wt 247 lb (112 kg)  BMI 41.1 kg/m2  Body mass index is 41.1 kg/(m^2).     GENERAL: healthy, alert and no distress  NECK: no adenopathy, no asymmetry, masses, or scars and thyroid normal to palpation  RESP: lungs clear to auscultation - no rales, rhonchi or wheezes  CV: regular rate and rhythm, normal S1 S2, no S3 or S4, no murmur, click or rub, no peripheral edema and peripheral pulses strong  PSYCH: mentation appears normal, affect normal/bright        ASSESSMENT/PLAN:     Depression; " recurrent episode-- Mild   Associated with the following complications:    None   Plan:  No changes in the patient's current treatment plan      1. Moderate episode of recurrent major depressive disorder (H)  - Follow up in 1 month  - Monitor mood  - Read up on 5HTP            Samantha Quintana NP  [facility]

## 2017-12-06 NOTE — PATIENT INSTRUCTIONS
1. Moderate episode of recurrent major depressive disorder (H)  - Follow up in 1 month  - Monitor mood  - Read up on 5HTP      Samantha Quintana [facility]  [phone]          Samantha Quintana NP  [facility]

## 2017-12-06 NOTE — NURSING NOTE
"Chief Complaint   Patient presents with     Depression     Anxiety       Initial BP 96/62 (BP Location: Right arm, Patient Position: Sitting, Cuff Size: Adult Large)  Pulse 60  Temp 97.9  F (36.6  C) (Tympanic)  Resp 14  Ht 5' 5\" (1.651 m)  Wt 247 lb (112 kg)  BMI 41.1 kg/m2 Estimated body mass index is 41.1 kg/(m^2) as calculated from the following:    Height as of this encounter: 5' 5\" (1.651 m).    Weight as of this encounter: 247 lb (112 kg).  Medication Reconciliation: complete   Sunitha Diaz      "

## 2017-12-06 NOTE — MR AVS SNAPSHOT
"              After Visit Summary   12/6/2017    Julia Fierro    MRN: [id number]           Patient Information     Date Of Birth          1985        Visit Information        Provider Department      12/6/2017 4:00 PM Samantha Quintana NP [facility]        Today's Diagnoses     Moderate episode of recurrent major depressive disorder (H)    -  1      Care Instructions        1. Moderate episode of recurrent major depressive disorder (H)  - Follow up in 1 month  - Monitor mood  - Read up on 5HTP      Samantha Quintana C-FNP  [phone]          Samantha Quintana NP  [facility]                Follow-ups after your visit        Who to contact     If you have questions or need follow up information about today's clinic visit or your schedule please contact [facility] directly at [phone].  Normal or non-critical lab and imaging results will be communicated to you by Karma Snaphart, letter or phone within 4 business days after the clinic has received the results. If you do not hear from us within 7 days, please contact the clinic through MyChart or phone. If you have a critical or abnormal lab result, we will notify you by phone as soon as possible.  Submit refill requests through [employer] or call your pharmacy and they will forward the refill request to us. Please allow 3 business days for your refill to be completed.          Additional Information About Your Visit        MyChart Information     [employer] lets you send messages to your doctor, view your test results, renew your prescriptions, schedule appointments and more. To sign up, go to [email or web address] . Click on \"Log in\" on the left side of the screen, which will take you to the Welcome page. Then click on \"Sign up Now\" on the right side of the page.     You will be asked to enter the access code listed below, as well as some personal information. Please follow the directions to create your username and password.     Your " "access code is: [email or web address]  Expires: 3/6/2018  5:18 PM     Your access code will  in 90 days. If you need help or a new code, please call your [facility] clinic or [phone].        Care EveryWhere ID     This is your Care EveryWhere ID. This could be used by other organizations to access your [facility] medical records  [id number]        Your Vitals Were     Pulse Temperature Respirations Height BMI (Body Mass Index)       60 97.9  F (36.6  C) (Tympanic) 14 5' 5\" (1.651 m) 41.1 kg/m2        Blood Pressure from Last 3 Encounters:   17 96/62   17 98/58   17 (!) 90/49    Weight from Last 3 Encounters:   17 247 lb (112 kg)   17 240 lb (108.9 kg)   17 230 lb (104.3 kg)              Today, you had the following     No orders found for display       Primary Care Provider Office Phone # Fax #    Samantha Quintana [phone] [address]  [place] [address]        Equal Access to Services     [facility]: Hadii timothy magallaneso Sosydnie, waaxda luqadaha, qaybta kaalmada adeegyada, nathaniel kong. So [facility] [phone].    ATENCIÓN: Si habla español, tiene a mcgill disposición servicios gratuitos de asistencia lingüística. Ll[facility] [phone].    We comply with applicable federal civil rights laws and [place] laws. We do not discriminate on the basis of race, color, national origin, age, disability, sex, sexual orientation, or gender identity.            Thank you!     Thank you for choosing [facility]  for your care. Our goal is always to provide you with excellent care. Hearing back from our patients is one way we can continue to improve our services. Please take a few minutes to complete the written survey that you may receive in the mail after your visit with us. Thank you!             Your Updated Medication List - Protect others around you: Learn how to safely use, store and throw away your medicines at " www.disposemymeds.org.          This list is accurate as of: 12/6/17  5:18 PM.  Always use your most recent med list.                   Brand Name Dispense Instructions for use Diagnosis    digoxin 250 MCG tablet    LANOXIN     Take 0.25 mg by mouth as needed        MAGNESIUM PO      Take 250 mg by mouth 2 times daily        prenatal multivitamin  with iron 28-0.8 MG Tabs     30 each    Take 1 tablet by mouth daily        TYLENOL PO

## 2017-12-07 ASSESSMENT — ANXIETY QUESTIONNAIRES: GAD7 TOTAL SCORE: 3

## 2018-01-15 ENCOUNTER — TELEPHONE (OUTPATIENT)
Dept: FAMILY MEDICINE | Facility: OTHER | Age: 33
End: 2018-01-15

## 2018-01-15 RX ORDER — ESCITALOPRAM OXALATE 10 MG/1
10 TABLET ORAL DAILY
Qty: 30 TABLET | Refills: 0 | COMMUNITY
Start: 2018-01-15 | End: 2021-03-31

## 2018-01-15 NOTE — TELEPHONE ENCOUNTER
Pt calls, wants to be seen for post-partem depression, delivered 11-24th.  Had lamont taking Lexapro 20mg daily.   Can get in same day tomorrow???  Or wait for 30min appt and restart meds???

## 2018-01-15 NOTE — TELEPHONE ENCOUNTER
If not breast-feeding, can start lexapro 10mg daily - if she needs a new Rx, pls enter.  Schedule her in 1-2 weeks.    [facility]HISt[facility]  [phone]

## 2018-03-05 ENCOUNTER — CARE COORDINATION (OUTPATIENT)
Dept: CARE COORDINATION | Facility: OTHER | Age: 33
End: 2018-03-05

## 2018-03-05 NOTE — PROGRESS NOTES
Clinic Care Coordination Contact  [facility]/Voicemail       Clinical Data: Care Coordinator Outreach  Outreach attempted x 1.  Left message on voicemail with call back information and requested return call.  Plan: Care Coordinator mailed out care coordination introduction letter on 12/15/17. Care Coordinator will try to reach patient again in 3-5 business days.    Lauren Pipkin, BS-RN   CHF and General Care Coordinator  [phone] Option # 1

## 2018-11-27 DIAGNOSIS — F33.1 MODERATE EPISODE OF RECURRENT MAJOR DEPRESSIVE DISORDER (H): Primary | ICD-10-CM

## 2018-11-27 NOTE — TELEPHONE ENCOUNTER
Lexapro  Last Written Prescription Date:  Unknown, medication is listed as historical  Last Fill Qty:  , # Refills:    Last Office Visit:  12/6/17

## 2018-11-27 NOTE — LETTER
[facility]  [address] [place] [address]                    Julia Fierro  [address] [place] [id number]            November 29, 2018       Dear Julia Fierro,    APPOINTMENT REMINDER:       Our record indicates that it is time for you to be seen for an office visit with HEMA Red.  You may call our office at [phone] to schedule an appointment for depression follow up. Please disregard this notice if you have already made an appointment.      Sincerely,    HEMA Red    MRN: [id number]

## 2018-11-28 RX ORDER — ESCITALOPRAM OXALATE 20 MG/1
TABLET ORAL
Qty: 15 TABLET | Refills: 0 | Status: SHIPPED | OUTPATIENT
Start: 2018-11-28 | End: 2021-05-12

## 2018-12-11 ENCOUNTER — TELEPHONE (OUTPATIENT)
Dept: FAMILY MEDICINE | Facility: OTHER | Age: 33
End: 2018-12-11

## 2018-12-11 NOTE — TELEPHONE ENCOUNTER
Patient called and is wondering if she could get STD testing done today. Her partner has had relationship with someone who has HIV. Please advise.

## 2018-12-12 ENCOUNTER — TELEPHONE (OUTPATIENT)
Dept: FAMILY MEDICINE | Facility: OTHER | Age: 33
End: 2018-12-12

## 2018-12-12 NOTE — TELEPHONE ENCOUNTER
Patient arrived late for appt and was no show status.She is asking if this appt reason could be addressed at her upcoming medication review on 12.21.18?Thank you.

## 2019-07-22 NOTE — DISCHARGE INSTRUCTIONS
- Will start with low dose zoloft and follow up with Samantha in 1 week.   - I also ordered the vitamin D and prenatal to get you started.   - Last HCG was 2713, todays is pending - we can call with results. Call back here in 1 hr if you dont hear anything.  
22-Jul-2019 18:01

## 2020-01-25 ENCOUNTER — TRANSFERRED RECORDS (OUTPATIENT)
Dept: HEALTH INFORMATION MANAGEMENT | Facility: CLINIC | Age: 35
End: 2020-01-25

## 2020-08-18 ENCOUNTER — TRANSFERRED RECORDS (OUTPATIENT)
Dept: HEALTH INFORMATION MANAGEMENT | Facility: CLINIC | Age: 35
End: 2020-08-18

## 2020-09-27 ENCOUNTER — TRANSFERRED RECORDS (OUTPATIENT)
Dept: HEALTH INFORMATION MANAGEMENT | Facility: CLINIC | Age: 35
End: 2020-09-27

## 2021-01-12 ENCOUNTER — TRANSFERRED RECORDS (OUTPATIENT)
Dept: HEALTH INFORMATION MANAGEMENT | Facility: CLINIC | Age: 36
End: 2021-01-12

## 2021-02-18 ENCOUNTER — MEDICAL CORRESPONDENCE (OUTPATIENT)
Dept: HEALTH INFORMATION MANAGEMENT | Facility: OTHER | Age: 36
End: 2021-02-18

## 2021-02-19 ENCOUNTER — HOSPITAL ENCOUNTER (EMERGENCY)
Facility: HOSPITAL | Age: 36
Discharge: HOME | End: 2021-02-19
Attending: NURSE PRACTITIONER | Admitting: NURSE PRACTITIONER
Payer: COMMERCIAL

## 2021-02-19 VITALS
OXYGEN SATURATION: 98 % | HEART RATE: 78 BPM | DIASTOLIC BLOOD PRESSURE: 92 MMHG | RESPIRATION RATE: 16 BRPM | SYSTOLIC BLOOD PRESSURE: 173 MMHG | TEMPERATURE: 96.9 F

## 2021-02-19 DIAGNOSIS — K04.7 INFECTED DENTAL CARIES: Primary | ICD-10-CM

## 2021-02-19 DIAGNOSIS — Z87.891 PERSONAL HISTORY OF TOBACCO USE, PRESENTING HAZARDS TO HEALTH: ICD-10-CM

## 2021-02-19 DIAGNOSIS — K02.9 INFECTED DENTAL CARIES: Primary | ICD-10-CM

## 2021-02-19 DIAGNOSIS — K08.89 PAIN, DENTAL: ICD-10-CM

## 2021-02-19 PROCEDURE — 99213 OFFICE O/P EST LOW 20 MIN: CPT | Performed by: NURSE PRACTITIONER

## 2021-02-19 PROCEDURE — 250N000013 HC RX MED GY IP 250 OP 250 PS 637: Performed by: NURSE PRACTITIONER

## 2021-02-19 PROCEDURE — G0463 HOSPITAL OUTPT CLINIC VISIT: HCPCS

## 2021-02-19 RX ORDER — CLINDAMYCIN HCL 300 MG
300 CAPSULE ORAL 3 TIMES DAILY
Qty: 21 CAPSULE | Refills: 0 | Status: SHIPPED | OUTPATIENT
Start: 2021-02-19 | End: 2021-02-26

## 2021-02-19 RX ORDER — CHLORHEXIDINE GLUCONATE ORAL RINSE 1.2 MG/ML
15 SOLUTION DENTAL 2 TIMES DAILY
Qty: 473 ML | Refills: 0 | Status: SHIPPED | OUTPATIENT
Start: 2021-02-19 | End: 2021-07-09

## 2021-02-19 RX ORDER — ACETAMINOPHEN 325 MG/1
650 TABLET ORAL ONCE
Status: COMPLETED | OUTPATIENT
Start: 2021-02-19 | End: 2021-02-19

## 2021-02-19 RX ADMIN — ACETAMINOPHEN 650 MG: 325 TABLET, FILM COATED ORAL at 09:24

## 2021-02-19 NOTE — ED PROVIDER NOTES
History     Chief Complaint   Patient presents with     Dental Pain     bottom left. unable to see a dentist      HPI  Julia Fierro is a 35 year old female who presents to urgent care for dental pain. Patient reports left lower intermittent dental pain x 1 year. Pain has worsened and patient was seen at [facility] last month with antibiotics prescribed. She was on a waitlist for the dentist and when they called she was unable to get there in time as it was 1 hour away. Patient is still looking for a dentist at this time. No fever or chills. Able to swallow own saliva with minimal difficulty.     Blood pressure noted to be elevated during triage.  Patient notes that she is stressed out currently.  Denies any chest pain or shortness of breath.    Allergies:  Allergies   Allergen Reactions     Sertraline      Sertraline Hcl      Lightheaded  Shakes        Shakes       Penicillins Rash       Problem List:    Patient Active Problem List    Diagnosis Date Noted     Paroxysmal supraventricular tachycardia (H) 07/24/2017     Priority: Medium     Moderate episode of recurrent major depressive disorder (H) 03/25/2015     Priority: Medium     Anxiety 07/01/2014     Priority: Medium        Past Medical History:    Past Medical History:   Diagnosis Date     Anxiety      Anxiety 7/1/2014     Major depression, recurrent (H) 3/25/2015     Moderate episode of recurrent major depressive disorder (H) 3/25/2015     Panic attacks 7/15/2015     Paroxysmal supraventricular tachycardia (H) 7/24/2017     Tobacco abuse 7/24/2017       Past Surgical History:    Past Surgical History:   Procedure Laterality Date     EXTRACTION(S) DENTAL      wisdom teeth extracted     LAPAROSCOPY  2005    Endometriosis       Family History:    Family History   Problem Relation Age of Onset     Arthritis Mother      Coronary Artery Disease Mother      Hyperlipidemia Father      Hypertension Father      Other Cancer Father      Diabetes Paternal Grandmother         Social History:  Marital Status:  Single [1]  Social History     Tobacco Use     Smoking status: Former Smoker     Types: Cigarettes     Smokeless tobacco: Never Used     Tobacco comment: tried to quit yes, yr quit 2008, no passive exposure   Substance Use Topics     Alcohol use: Yes     Alcohol/week: 0.0 standard drinks     Comment: occasionally     Drug use: No        Medications:    chlorhexidine (PERIDEX) 0.12 % solution  clindamycin (CLEOCIN) 300 MG capsule  Acetaminophen (TYLENOL PO)  digoxin (LANOXIN) 250 MCG tablet  escitalopram (LEXAPRO) 10 MG tablet  escitalopram (LEXAPRO) 20 MG tablet  MAGNESIUM PO  Prenatal Vit-Fe Fumarate-FA (PRENATAL MULTIVITAMIN  WITH IRON) 28-0.8 MG TABS          Review of Systems   HENT: Positive for dental problem.    All other systems reviewed and are negative.      Physical Exam   BP: (!) 177/106  Pulse: 78  Temp: 96.9  F (36.1  C)  Resp: 16  SpO2: 98 %      Physical Exam  Vitals signs and nursing note reviewed.   Constitutional:       Appearance: Normal appearance. She is not ill-appearing or toxic-appearing.   HENT:      Head: Normocephalic.      Jaw: No trismus.      Right Ear: Tympanic membrane and ear canal normal.      Left Ear: Tympanic membrane and ear canal normal.      Nose: Nose normal.      Mouth/Throat:      Mouth: Mucous membranes are moist.      Dentition: Dental caries present. No gingival swelling.      Pharynx: Uvula midline.      Tonsils: No tonsillar exudate or tonsillar abscesses.      Comments: Several dental caries observed.  No obvious dental abscess.  No appreciable gingival swelling.  Eyes:      Extraocular Movements: Extraocular movements intact.      Pupils: Pupils are equal, round, and reactive to light.   Neck:      Musculoskeletal: Neck supple.   Cardiovascular:      Rate and Rhythm: Normal rate and regular rhythm.      Heart sounds: Normal heart sounds.   Pulmonary:      Effort: Pulmonary effort is normal. No respiratory distress.      Breath  sounds: Normal breath sounds.   Lymphadenopathy:      Cervical: No cervical adenopathy.   Skin:     General: Skin is warm and dry.      Capillary Refill: Capillary refill takes less than 2 seconds.   Neurological:      Mental Status: She is alert and oriented to person, place, and time.         ED Course        Procedures              No results found for this or any previous visit (from the past 24 hour(s)).    Medications   acetaminophen (TYLENOL) tablet 650 mg (650 mg Oral Given 2/19/21 0924)       Assessments & Plan (with Medical Decision Making)     I have reviewed the nursing notes.    I have reviewed the findings, diagnosis, plan and need for follow up with the patient.  35-year-old female who presented to urgent care for persistent dental pain.  Patient has several dental caries.  No obvious dental abscess.  No appreciable gingival swelling.  No trismus.  Patient is afebrile.  Completed antibiotics last month and missed a dentist appointment.  Currently looking for a dentist.  Clindamycin as prescribed along with Peridex.  Recommended Tylenol or ibuprofen as needed for pain.  List of dentists in the area given to patient.  Advised her to schedule an appointment with the dentist for evaluation of her teeth.   Patient's blood pressure was rechecked and noted to be still elevated but better at 173/92.  Patient continues to deny any chest pain or trouble breathing.  Recommended rechecking of her blood pressure at home and close follow-up with PCP if it continues to be elevated.   Return to ED/UC for worsening or concerning symptoms.  Patient verbalized understanding.    This document was prepared using a combination of typing and voice generated software.  While every attempt was made for accuracy, spelling and grammatical errors may exist.    New Prescriptions    CHLORHEXIDINE (PERIDEX) 0.12 % SOLUTION    Swish and spit 15 mLs in mouth 2 times daily    CLINDAMYCIN (CLEOCIN) 300 MG CAPSULE    Take 1 capsule (300  mg) by mouth 3 times daily for 7 days       Final diagnoses:   Infected dental caries   Pain, dental       2/19/2021   [facility]     Mpofu, Prudence, CNP  02/19/21 2022

## 2021-02-19 NOTE — ED TRIAGE NOTES
Pt is here with c/o left sided lower dental pain   Reports having trouble getting in with dentist   List of dentist handed to patient   Pt currently did not take anything for pain today   ongoing 2 days worst pain today

## 2021-02-19 NOTE — DISCHARGE INSTRUCTIONS
Take antibiotic as prescribed until finished. Use mouthwash as prescribed. Continue taking tylenol or ibuprofen as needed for pain.     Schedule an appointment with a dentist for evaluation of your teeth.    Return to emergency department for worsening or concerning symptoms.

## 2021-02-22 ENCOUNTER — TRANSFERRED RECORDS (OUTPATIENT)
Dept: HEALTH INFORMATION MANAGEMENT | Facility: CLINIC | Age: 36
End: 2021-02-22

## 2021-02-22 LAB
CREAT SERPL-MCNC: 0.71 MG/DL (ref 0.4–1)
GFR SERPL CREATININE-BSD FRML MDRD: >60 ML/MIN/1.73M2
GLUCOSE SERPL-MCNC: 114 MG/DL (ref 70–99)
POTASSIUM SERPL-SCNC: 4 MEQ/L (ref 3.4–5.1)

## 2021-03-30 ENCOUNTER — HOSPITAL ENCOUNTER (EMERGENCY)
Facility: HOSPITAL | Age: 36
Discharge: HOME | End: 2021-03-30
Attending: EMERGENCY MEDICINE | Admitting: EMERGENCY MEDICINE
Payer: COMMERCIAL

## 2021-03-30 VITALS
OXYGEN SATURATION: 96 % | RESPIRATION RATE: 18 BRPM | TEMPERATURE: 97.7 F | DIASTOLIC BLOOD PRESSURE: 90 MMHG | SYSTOLIC BLOOD PRESSURE: 141 MMHG | HEART RATE: 70 BPM

## 2021-03-30 DIAGNOSIS — F41.9 ANXIETY: ICD-10-CM

## 2021-03-30 PROCEDURE — 99281 EMR DPT VST MAYX REQ PHY/QHP: CPT

## 2021-03-30 PROCEDURE — 99282 EMERGENCY DEPT VISIT SF MDM: CPT | Performed by: EMERGENCY MEDICINE

## 2021-03-30 NOTE — ED TRIAGE NOTES
Patient got out of residential on Thursday, a lot of life events happening. Was suppose to be in court this am. Swings between crying and talking.

## 2021-03-30 NOTE — ED NOTES
Patient walked out her room stating she was leaving as she has an appraisal she needs to get to. Patient refused to go back to the room stating she talked with the Dr and she will not be taking any medications today. States we can fax the information, but explained if she had a seat in the room we could get her all the information. She refused and walked out.

## 2021-03-30 NOTE — ED NOTES
Patient left. Stated to fax papers to her dad. Was not taking any medications today. Walked out and said goodbye

## 2021-03-30 NOTE — ED PROVIDER NOTES
Emergency Department Provider Note  : 1985 Age: 35 year old Sex: female MRN: [id number]    Chief Complaint   Patient presents with     Anxiety       Medical Decision Making / Assessment / Plan   35 year old female presenting with anxiety.  The patient has pressured speech on arrival, but is nontoxic-appearing does not appear to be under the influence of any intoxicating substance.  She does not appear to be danger of herself or to others, she denies suicidal homicidal ideation.  Very upset because she reported she likely lost a court call today.  Offered medications as needed which she declined.  She would like to go that she feels better after venting.  Given she does not appear to be a threat to herself or others, I discharge the patient at her request.  Left before getting paperwork.    The patient was informed of the plan and verbalized understanding and agreed with the plan. The patient was given strict return to Emergency Department precautions as well as appropriate follow up instructions. The patient was discharged in stable condition.    Discharge Medication List as of 3/30/2021 10:05 AM          Final diagnoses:   Anxiety       Manolo Stein MD  3/30/2021   Emergency Department    Ramírez Dumont is a 35 year old female who presents at  9:37 AM with anxiety.  The patient has a history of court visits in terms of custody battles with her children and per the patient reportedly she thinks she lost today.  She is very upset with pressured speech and rambling and she said she gets palpitations.  Declines further work-up at this time.  No syncope, chest pain, shortness of breath.    I have reviewed the Medications, Allergies, Past Medical and Surgical History, and Social History in the [facility] and with family.    Review of Systems:  Please see HPI for pertinent positives and negatives. All other systems reviewed and found to be negative.      Objective   BP: 141/90  Pulse: 70  Temp: 97.7   F (36.5  C)  Resp: 18  SpO2: 96 %    Physical Exam:   General: Awake, alert, in no acute distress.  Head: Normocephalic, atraumatic.  Eyes: Conjugate gaze.  ENT: Moist membranes, external ear appears normal.   Chest/Respiratory: Equal chest rise.  Cardiovascular: Peripheral pulses present.  Abdominal: Soft, non-distended, non-tender.  Extremities: No obvious deformity.  Neurological: GCS 15, moving all extremities without gross deficit.  Skin: Warm, no rashes, lesions, or bruising.   Psychiatric: Upset. Pressured speech.    Procedures / Critical Care   Procedures    Critical Care Time: None.          Medical/Surgical History:  Past Medical History:   Diagnosis Date     Anxiety      Anxiety 7/1/2014     Major depression, recurrent (H) 3/25/2015     Moderate episode of recurrent major depressive disorder (H) 3/25/2015     Panic attacks 7/15/2015     Paroxysmal supraventricular tachycardia (H) 7/24/2017     Tobacco abuse 7/24/2017     Past Surgical History:   Procedure Laterality Date     EXTRACTION(S) DENTAL      wisdom teeth extracted     LAPAROSCOPY  2005    Endometriosis       Medications:  No current facility-administered medications for this encounter.      Current Outpatient Medications   Medication     Acetaminophen (TYLENOL PO)     chlorhexidine (PERIDEX) 0.12 % solution     digoxin (LANOXIN) 250 MCG tablet     escitalopram (LEXAPRO) 10 MG tablet     escitalopram (LEXAPRO) 20 MG tablet     MAGNESIUM PO     Prenatal Vit-Fe Fumarate-FA (PRENATAL MULTIVITAMIN  WITH IRON) 28-0.8 MG TABS       Allergies:  Sertraline, Sertraline hcl, and Penicillins    Relevant labs, images, EKGs, Epic and outside hospital (if applicable) charts were reviewed. The findings, diagnosis, plan, and need for follow up were discussed with the patient/family. Nursing notes were reviewed.      Manolo Stein MD  03/30/21 1044

## 2021-03-31 DIAGNOSIS — F41.9 ANXIETY: Primary | ICD-10-CM

## 2021-03-31 RX ORDER — ESCITALOPRAM OXALATE 10 MG/1
10 TABLET ORAL DAILY
Qty: 30 TABLET | Refills: 0 | Status: SHIPPED | OUTPATIENT
Start: 2021-03-31 | End: 2021-05-12

## 2021-03-31 NOTE — TELEPHONE ENCOUNTER
LEXAPRO 10MG       Last Written Prescription Date:  1-  Last Fill Quantity: 30,   # refills: 0  Last Office Visit: 2-  Future Office visit:       Routing refill request to provider for review/approval because:  Drug not on the [facility], P or [facility] refill protocol or controlled substance

## 2021-04-30 ENCOUNTER — TRANSFERRED RECORDS (OUTPATIENT)
Dept: HEALTH INFORMATION MANAGEMENT | Facility: CLINIC | Age: 36
End: 2021-04-30

## 2021-05-11 NOTE — PROGRESS NOTES
Assessment & Plan        Anxiety  - escitalopram (LEXAPRO) 10 MG tablet; Take 1 tablet (10 mg) by mouth daily  - hydrOXYzine (ATARAX) 50 MG tablet; Take 1 tablet (50 mg) by mouth every 6 hours as needed for itching    Dental abscess  - clindamycin (CLEOCIN) 300 MG capsule; Take 1 capsule (300 mg) by mouth 3 times daily for 10 days      Please schedule a dental appointment      Samantha Quintana CNP  [facility] - KEYLA Dumont is a 35 year old who presents for the following health issues       Concern - Abscess in Mouth  Onset: 1 year on and off     Description:   Dental pain, no current dental provider     Intensity: moderate, severe    Progression of Symptoms:  worsening and same    Accompanying Signs & Symptoms:  Pain, swelling     Previous history of similar problem:   On going    Precipitating factors:   Worsened by: chewing    Alleviating factors:  Improved by: tylenol  Therapies Tried and outcome: tylenol       -Left side, back, bottom molar  -Sharp pain   -Had 4 root canals when pregnant with 3 year old  -Trying to see dentist  -Looking for referral to oral surgeon  -Endorses fever/chills/sweats a few days ago  -BP has been running high lately        ED/UC Followup:  Facility:  [facility]  Date of visit: 4/30/2021  Reason for visit: Panic attack (Chest discomfort that did not feel similar to PMH of SVT)  Current Status: Improved, but anxiety is still high          Anxiety Follow-Up    How are you doing with your anxiety since your last visit? Worsened     Are you having other symptoms that might be associated with anxiety? Yes:  Depression, troubles falling and staying asleep, panic attacks    Have you had a significant life event? OTHER: Not seeing children since December     Are you feeling depressed? Yes     Do you have any concerns with your use of alcohol or other drugs? No      -Sleeping 2-3 hours/night at a time  -Endorses nightmares  -Not currently taking Lexapro, but was helpful  "in the past: would like to restart at low dose  -Received hydroxyzine at hospital for panic attack  -Working with Chong at outpatient treatment for drug treatment (marijuana)  -Was just in inpatient treatment for 2 weeks         Social History     Tobacco Use     Smoking status: Current Every Day Smoker     Packs/day: 0.25     Types: Cigarettes     Smokeless tobacco: Never Used     Tobacco comment: tried to quit yes, yr quit 2008, no passive exposure   Substance Use Topics     Alcohol use: Yes     Alcohol/week: 0.0 standard drinks     Comment: occasionally     Drug use: No     KULDIP-7 SCORE 7/24/2017 12/6/2017 5/12/2021   Total Score 10 3 13     PHQ 7/24/2017 12/6/2017 5/12/2021   PHQ-9 Total Score 9 5 14   Q9: Thoughts of better off dead/self-harm past 2 weeks Not at all Not at all Not at all       Review of Systems   Constitutional, HEENT, cardiovascular, pulmonary, gi and gu systems are negative, except as otherwise noted.        Objective    BP (!) 162/80 (BP Location: Right arm, Patient Position: Chair, Cuff Size: Adult Large)   Pulse 98   Temp 98.2  F (36.8  C) (Tympanic)   Ht 1.702 m (5' 7\")   Wt 97.5 kg (215 lb)   SpO2 97%   BMI 33.67 kg/m    Body mass index is 33.67 kg/m .       Physical Exam   GENERAL: healthy, alert and no distress  HENT: ear canals and TM's normal, nose and mouth without ulcers or lesions. Tooth 18 (second molar) of bottom, left is broken and blackened in color. Surrounding gums are slightly erythematous. No significant swelling of area  NECK: no adenopathy, no asymmetry, masses, or scars and thyroid normal to palpation  RESP: lungs clear to auscultation - no rales, rhonchi or wheezes  CV: regular rate and rhythm, normal S1 S2, no S3 or S4, no murmur, click or rub, no peripheral edema and peripheral pulses strong  ABDOMEN: soft, nontender, no hepatosplenomegaly, no masses and bowel sounds normal  MS: no gross musculoskeletal defects noted, no edema  PSYCH: Mood is anxious, affect " is tearful.

## 2021-05-12 ENCOUNTER — OFFICE VISIT (OUTPATIENT)
Dept: FAMILY MEDICINE | Facility: OTHER | Age: 36
End: 2021-05-12
Attending: NURSE PRACTITIONER
Payer: COMMERCIAL

## 2021-05-12 ENCOUNTER — HOSPITAL ENCOUNTER (EMERGENCY)
Facility: HOSPITAL | Age: 36
Discharge: HOME | End: 2021-05-12
Attending: NURSE PRACTITIONER | Admitting: NURSE PRACTITIONER
Payer: COMMERCIAL

## 2021-05-12 VITALS
HEART RATE: 98 BPM | OXYGEN SATURATION: 97 % | DIASTOLIC BLOOD PRESSURE: 80 MMHG | WEIGHT: 215 LBS | SYSTOLIC BLOOD PRESSURE: 162 MMHG | BODY MASS INDEX: 33.74 KG/M2 | HEIGHT: 67 IN | TEMPERATURE: 98.2 F

## 2021-05-12 VITALS
SYSTOLIC BLOOD PRESSURE: 146 MMHG | DIASTOLIC BLOOD PRESSURE: 95 MMHG | RESPIRATION RATE: 16 BRPM | TEMPERATURE: 97.4 F | OXYGEN SATURATION: 98 % | HEART RATE: 74 BPM

## 2021-05-12 DIAGNOSIS — F41.9 ANXIETY: ICD-10-CM

## 2021-05-12 DIAGNOSIS — K04.7 DENTAL ABSCESS: Primary | ICD-10-CM

## 2021-05-12 PROCEDURE — 99282 EMERGENCY DEPT VISIT SF MDM: CPT | Performed by: NURSE PRACTITIONER

## 2021-05-12 PROCEDURE — 99214 OFFICE O/P EST MOD 30 MIN: CPT | Performed by: NURSE PRACTITIONER

## 2021-05-12 PROCEDURE — 99283 EMERGENCY DEPT VISIT LOW MDM: CPT | Mod: 27

## 2021-05-12 PROCEDURE — G0463 HOSPITAL OUTPT CLINIC VISIT: HCPCS

## 2021-05-12 PROCEDURE — 250N000013 HC RX MED GY IP 250 OP 250 PS 637: Performed by: NURSE PRACTITIONER

## 2021-05-12 RX ORDER — HYDROXYZINE HYDROCHLORIDE 50 MG/1
TABLET, FILM COATED ORAL
COMMUNITY
Start: 2021-04-09 | End: 2021-05-12

## 2021-05-12 RX ORDER — IBUPROFEN 600 MG/1
TABLET, FILM COATED ORAL
COMMUNITY
Start: 2021-05-04 | End: 2021-09-21

## 2021-05-12 RX ORDER — ESCITALOPRAM OXALATE 10 MG/1
10 TABLET ORAL DAILY
Qty: 30 TABLET | Refills: 1 | Status: SHIPPED | OUTPATIENT
Start: 2021-05-12 | End: 2021-09-21

## 2021-05-12 RX ORDER — HYDROXYZINE HYDROCHLORIDE 25 MG/1
25 TABLET, FILM COATED ORAL ONCE
Status: COMPLETED | OUTPATIENT
Start: 2021-05-12 | End: 2021-05-12

## 2021-05-12 RX ORDER — CLINDAMYCIN HCL 300 MG
300 CAPSULE ORAL 3 TIMES DAILY
Qty: 30 CAPSULE | Refills: 0 | Status: SHIPPED | OUTPATIENT
Start: 2021-05-12 | End: 2021-05-12

## 2021-05-12 RX ORDER — HYDROXYZINE HYDROCHLORIDE 50 MG/1
50 TABLET, FILM COATED ORAL EVERY 6 HOURS PRN
Qty: 30 TABLET | Refills: 1 | Status: SHIPPED | OUTPATIENT
Start: 2021-05-12 | End: 2021-07-09

## 2021-05-12 RX ADMIN — HYDROXYZINE HYDROCHLORIDE 25 MG: 25 TABLET, FILM COATED ORAL at 23:41

## 2021-05-12 ASSESSMENT — ANXIETY QUESTIONNAIRES
5. BEING SO RESTLESS THAT IT IS HARD TO SIT STILL: MORE THAN HALF THE DAYS
4. TROUBLE RELAXING: SEVERAL DAYS
6. BECOMING EASILY ANNOYED OR IRRITABLE: SEVERAL DAYS
7. FEELING AFRAID AS IF SOMETHING AWFUL MIGHT HAPPEN: MORE THAN HALF THE DAYS
2. NOT BEING ABLE TO STOP OR CONTROL WORRYING: MORE THAN HALF THE DAYS
3. WORRYING TOO MUCH ABOUT DIFFERENT THINGS: MORE THAN HALF THE DAYS
IF YOU CHECKED OFF ANY PROBLEMS ON THIS QUESTIONNAIRE, HOW DIFFICULT HAVE THESE PROBLEMS MADE IT FOR YOU TO DO YOUR WORK, TAKE CARE OF THINGS AT HOME, OR GET ALONG WITH OTHER PEOPLE: SOMEWHAT DIFFICULT
1. FEELING NERVOUS, ANXIOUS, OR ON EDGE: NEARLY EVERY DAY
GAD7 TOTAL SCORE: 13

## 2021-05-12 ASSESSMENT — PAIN SCALES - GENERAL: PAINLEVEL: MILD PAIN (2)

## 2021-05-12 ASSESSMENT — ENCOUNTER SYMPTOMS
NEUROLOGICAL NEGATIVE: 1
AGITATION: 1
ACTIVITY CHANGE: 1
NAUSEA: 0
NERVOUS/ANXIOUS: 1
VOMITING: 0
SHORTNESS OF BREATH: 0

## 2021-05-12 ASSESSMENT — PATIENT HEALTH QUESTIONNAIRE - PHQ9: SUM OF ALL RESPONSES TO PHQ QUESTIONS 1-9: 14

## 2021-05-12 ASSESSMENT — MIFFLIN-ST. JEOR: SCORE: 1702.86

## 2021-05-12 NOTE — PATIENT INSTRUCTIONS
Assessment & Plan        Anxiety  - escitalopram (LEXAPRO) 10 MG tablet; Take 1 tablet (10 mg) by mouth daily  - hydrOXYzine (ATARAX) 50 MG tablet; Take 1 tablet (50 mg) by mouth every 6 hours as needed for itching    Dental abscess  - clindamycin (CLEOCIN) 300 MG capsule; Take 1 capsule (300 mg) by mouth 3 times daily for 10 days      Please schedule a dental appointment      Samantha Quintana CNP  [facility] [place] [facility]

## 2021-05-12 NOTE — NURSING NOTE
"Chief Complaint   Patient presents with     Dental Pain       Initial BP (!) 162/80 (BP Location: Right arm, Patient Position: Chair, Cuff Size: Adult Large)   Pulse 98   Temp 98.2  F (36.8  C) (Tympanic)   Ht 1.702 m (5' 7\")   Wt 97.5 kg (215 lb)   SpO2 97%   BMI 33.67 kg/m   Estimated body mass index is 33.67 kg/m  as calculated from the following:    Height as of this encounter: 1.702 m (5' 7\").    Weight as of this encounter: 97.5 kg (215 lb).  Medication Reconciliation: complete  Kelly Nichols LPN    "

## 2021-05-13 ENCOUNTER — TELEPHONE (OUTPATIENT)
Dept: EMERGENCY MEDICINE | Facility: HOSPITAL | Age: 36
End: 2021-05-13

## 2021-05-13 ASSESSMENT — ANXIETY QUESTIONNAIRES: GAD7 TOTAL SCORE: 13
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
## 2021-05-13 NOTE — ED TRIAGE NOTES
Patient presents with complaints of anxiety and states that she had some high B/P that she feels was related to the anxiety
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
## 2021-05-13 NOTE — TELEPHONE ENCOUNTER
Care Transitions focused note:      Chart reviewed, discussed with provider.  Reached out to patient.  Message left with her friend as she does not have a phone.   Contact information left.     Will await call back.    ZARINA Muller

## 2021-05-13 NOTE — DISCHARGE INSTRUCTIONS
Follow-up with treatment center to get into out-patient treatment.    take medications for anxiety and depression as ordered per primary care provider.   Return to ER for increased anxiety.

## 2021-05-31 ENCOUNTER — TRANSFERRED RECORDS (OUTPATIENT)
Dept: HEALTH INFORMATION MANAGEMENT | Facility: CLINIC | Age: 36
End: 2021-05-31

## 2021-06-03 DIAGNOSIS — I47.10 SVT (SUPRAVENTRICULAR TACHYCARDIA) (H): Primary | ICD-10-CM

## 2021-06-03 NOTE — TELEPHONE ENCOUNTER
"Pt reports LOV 5/12/21 with Samantha Quintana and pt discussed \"heart condition, SVT, in order to receive dental work I need my digoxin filled.\" \"I've never had to take it before, but my dentist will not see me unless I have this med with me\". Pt reports cardiologist previously ordered digoxin in 2017. Pt reports she no longer has a cardiologist. Pt stated, \"the dentist told me I needed a new prescription\".  Per pt report ordered previously as Digoxin 250 mcg by mouth as needed if experiencing tachycardia,  or higher.   Pt reports Samantha Quintana is her PCP. Pt inquiring should she see a cardiologist? Would you like a follow-up scheduled or referral pended? Please advise. Updated pt PCP returns tomorrow, out today.   Pt reports Dental procedure scheduled on  6/10/21    Pt requesting call back at [phone] after PCP advises.   "

## 2021-06-04 ENCOUNTER — TRANSFERRED RECORDS (OUTPATIENT)
Dept: HEALTH INFORMATION MANAGEMENT | Facility: CLINIC | Age: 36
End: 2021-06-04

## 2021-06-04 RX ORDER — DIGOXIN 250 MCG
0.25 TABLET ORAL PRN
Qty: 4 TABLET | Refills: 0 | Status: SHIPPED | OUTPATIENT
Start: 2021-06-04 | End: 2022-02-25

## 2021-06-04 NOTE — TELEPHONE ENCOUNTER
Patient called back stating that she has been seeing Samantha Quintana for the past 5 years.  I read the message from Samantha Quintana to her and the patient wouldn't let me finish, I got through the first sentence and she blew up and wouldn't quit screaming at me.  I tried to interrupt her telling her I hadn't finished the message and she kept yelling and then hung up.

## 2021-06-04 NOTE — TELEPHONE ENCOUNTER
First, she sees Jess Mejia  Second, no dentist is going to request that a patient request digoxin for a medical procedure.  Perhaps she is confusing that with an antibiotic, which still wouldn't be indicated.  If she indeed has SVT, and hasn't seen cardiology since 2017, she needs to be set up with them.  I am not comfortable starting her on digoxin.     [facility]  [phone]

## 2021-06-16 ENCOUNTER — TRANSFERRED RECORDS (OUTPATIENT)
Dept: HEALTH INFORMATION MANAGEMENT | Facility: CLINIC | Age: 36
End: 2021-06-16

## 2021-06-29 ENCOUNTER — TRANSFERRED RECORDS (OUTPATIENT)
Dept: HEALTH INFORMATION MANAGEMENT | Facility: CLINIC | Age: 36
End: 2021-06-29

## 2021-06-29 LAB
CREATININE (EXTERNAL): 0.8 MG/DL (ref 0.4–1)
GFR ESTIMATED (EXTERNAL): >60 ML/MIN/1.73M2
GLUCOSE (EXTERNAL): 104 MG/DL (ref 70–99)
POTASSIUM (EXTERNAL): 3.9 MEQ/L (ref 3.4–5.1)

## 2021-07-08 ENCOUNTER — TRANSFERRED RECORDS (OUTPATIENT)
Dept: HEALTH INFORMATION MANAGEMENT | Facility: CLINIC | Age: 36
End: 2021-07-08

## 2021-07-09 ENCOUNTER — OFFICE VISIT (OUTPATIENT)
Dept: FAMILY MEDICINE | Facility: OTHER | Age: 36
End: 2021-07-09
Attending: NURSE PRACTITIONER
Payer: COMMERCIAL

## 2021-07-09 VITALS
DIASTOLIC BLOOD PRESSURE: 78 MMHG | TEMPERATURE: 98.7 F | BODY MASS INDEX: 33.67 KG/M2 | OXYGEN SATURATION: 100 % | HEIGHT: 67 IN | HEART RATE: 81 BPM | SYSTOLIC BLOOD PRESSURE: 124 MMHG

## 2021-07-09 DIAGNOSIS — F43.10 PTSD (POST-TRAUMATIC STRESS DISORDER): ICD-10-CM

## 2021-07-09 DIAGNOSIS — R79.0 LOW MAGNESIUM LEVEL: Primary | ICD-10-CM

## 2021-07-09 DIAGNOSIS — F41.9 ANXIETY: ICD-10-CM

## 2021-07-09 DIAGNOSIS — F33.1 MODERATE EPISODE OF RECURRENT MAJOR DEPRESSIVE DISORDER (H): ICD-10-CM

## 2021-07-09 PROBLEM — H52.203 MYOPIA OF BOTH EYES WITH ASTIGMATISM: Status: ACTIVE | Noted: 2021-06-08

## 2021-07-09 PROBLEM — H52.13 MYOPIA OF BOTH EYES WITH ASTIGMATISM: Status: ACTIVE | Noted: 2021-06-08

## 2021-07-09 LAB — MAGNESIUM SERPL-MCNC: 1.8 MG/DL (ref 1.6–2.3)

## 2021-07-09 PROCEDURE — 36415 COLL VENOUS BLD VENIPUNCTURE: CPT | Mod: ZL | Performed by: NURSE PRACTITIONER

## 2021-07-09 PROCEDURE — G0463 HOSPITAL OUTPT CLINIC VISIT: HCPCS

## 2021-07-09 PROCEDURE — 83735 ASSAY OF MAGNESIUM: CPT | Mod: ZL | Performed by: NURSE PRACTITIONER

## 2021-07-09 PROCEDURE — 99214 OFFICE O/P EST MOD 30 MIN: CPT | Performed by: NURSE PRACTITIONER

## 2021-07-09 RX ORDER — TOBRAMYCIN AND DEXAMETHASONE 3; 1 MG/ML; MG/ML
SUSPENSION/ DROPS OPHTHALMIC
COMMUNITY
Start: 2021-07-07 | End: 2021-09-21

## 2021-07-09 RX ORDER — POLYMYXIN B SULFATE AND TRIMETHOPRIM 1; 10000 MG/ML; [USP'U]/ML
SOLUTION OPHTHALMIC
COMMUNITY
Start: 2021-06-27 | End: 2021-09-21

## 2021-07-09 RX ORDER — MULTIVITAMIN WITH IRON
250 TABLET ORAL
COMMUNITY
End: 2021-09-21

## 2021-07-09 RX ORDER — PREDNISOLONE SODIUM PHOSPHATE 10 MG/ML
1 SOLUTION/ DROPS OPHTHALMIC
COMMUNITY
Start: 2021-07-06 | End: 2021-07-16

## 2021-07-09 RX ORDER — LORAZEPAM 0.5 MG/1
TABLET ORAL
Qty: 30 TABLET | Refills: 1 | Status: SHIPPED | OUTPATIENT
Start: 2021-07-09 | End: 2021-08-02

## 2021-07-09 RX ORDER — LORAZEPAM 1 MG/1
TABLET ORAL
COMMUNITY
Start: 2021-07-02 | End: 2021-07-09

## 2021-07-09 RX ORDER — CEPHALEXIN 500 MG/1
500 TABLET ORAL
COMMUNITY
End: 2021-09-21

## 2021-07-09 ASSESSMENT — ANXIETY QUESTIONNAIRES
7. FEELING AFRAID AS IF SOMETHING AWFUL MIGHT HAPPEN: MORE THAN HALF THE DAYS
3. WORRYING TOO MUCH ABOUT DIFFERENT THINGS: SEVERAL DAYS
IF YOU CHECKED OFF ANY PROBLEMS ON THIS QUESTIONNAIRE, HOW DIFFICULT HAVE THESE PROBLEMS MADE IT FOR YOU TO DO YOUR WORK, TAKE CARE OF THINGS AT HOME, OR GET ALONG WITH OTHER PEOPLE: SOMEWHAT DIFFICULT
1. FEELING NERVOUS, ANXIOUS, OR ON EDGE: MORE THAN HALF THE DAYS
2. NOT BEING ABLE TO STOP OR CONTROL WORRYING: SEVERAL DAYS
GAD7 TOTAL SCORE: 11
6. BECOMING EASILY ANNOYED OR IRRITABLE: SEVERAL DAYS
5. BEING SO RESTLESS THAT IT IS HARD TO SIT STILL: MORE THAN HALF THE DAYS

## 2021-07-09 ASSESSMENT — PATIENT HEALTH QUESTIONNAIRE - PHQ9
5. POOR APPETITE OR OVEREATING: MORE THAN HALF THE DAYS
SUM OF ALL RESPONSES TO PHQ QUESTIONS 1-9: 10

## 2021-07-09 ASSESSMENT — PAIN SCALES - GENERAL: PAINLEVEL: NO PAIN (0)
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
## 2021-07-09 NOTE — PATIENT INSTRUCTIONS
Assessment & Plan        PTSD (post-traumatic stress disorder)  - Continue counseling services      Low magnesium level  - Magnesium      Anxiety  - LORazepam (ATIVAN) 0.5 MG tablet; 1 po BID PRN      Moderate episode of recurrent major depressive disorder (H)  - Continue counseling services      Patient has extensive counseling resources available to her  She is going to [facility], and has a med management appointment at the end of July      I will fill her Ativan only until she can get in to see someone more advanced  than I to manage her medication      She has the mobile crisis unit number, and has utilized it in the past      Samantha Quintana CNP  [facility]

## 2021-07-09 NOTE — NURSING NOTE
"Chief Complaint   Patient presents with     Anxiety       Initial /78   Pulse 81   Temp 98.7  F (37.1  C) (Tympanic)   Ht 1.702 m (5' 7\")   SpO2 100%   BMI 33.67 kg/m   Estimated body mass index is 33.67 kg/m  as calculated from the following:    Height as of this encounter: 1.702 m (5' 7\").    Weight as of 5/12/21: 97.5 kg (215 lb).  Medication Reconciliation: complete  Kelly Nichols LPN    "

## 2021-07-10 ASSESSMENT — ANXIETY QUESTIONNAIRES: GAD7 TOTAL SCORE: 11

## 2021-07-18 ENCOUNTER — TRANSFERRED RECORDS (OUTPATIENT)
Dept: HEALTH INFORMATION MANAGEMENT | Facility: CLINIC | Age: 36
End: 2021-07-18

## 2021-07-19 ENCOUNTER — TRANSFERRED RECORDS (OUTPATIENT)
Dept: HEALTH INFORMATION MANAGEMENT | Facility: CLINIC | Age: 36
End: 2021-07-19

## 2021-07-26 ENCOUNTER — TRANSFERRED RECORDS (OUTPATIENT)
Dept: HEALTH INFORMATION MANAGEMENT | Facility: HOSPITAL | Age: 36
End: 2021-07-26
Payer: COMMERCIAL

## 2021-07-26 LAB — PAP-ABSTRACT: NORMAL

## 2021-08-02 DIAGNOSIS — F41.9 ANXIETY: ICD-10-CM

## 2021-08-02 RX ORDER — LORAZEPAM 0.5 MG/1
TABLET ORAL
Qty: 30 TABLET | Refills: 1 | Status: SHIPPED | OUTPATIENT
Start: 2021-08-02 | End: 2021-11-19

## 2021-08-02 NOTE — TELEPHONE ENCOUNTER
Patient is calling for a refill of the lorazepam and possibly an increase in dosage.  Patient is taking 2 pills a day. Mostly at night to help her relax and sleep but then during the day she is on edge.    LORazepam (ATIVAN) 0.5 MG tablet      Last Written Prescription Date:  7/9/21  Last Fill Quantity: 30,   # refills: 1  Last Office Visit: 7/9/21  Future Office visit:       Routing refill request to provider for review/approval because:  Drug not on the [facility] [place] or [facility] refill protocol or controlled substance

## 2021-08-24 ENCOUNTER — NURSE TRIAGE (OUTPATIENT)
Dept: FAMILY MEDICINE | Facility: OTHER | Age: 36
End: 2021-08-24

## 2021-08-24 NOTE — TELEPHONE ENCOUNTER
Pt reports today she dx shingles by [facility]. Pt reports she picked up her valtrex script and started med today.    Pt reports shingles located on her back. Pt reports she has a hx of this in the past. Requested to see pcp. Education completed.     Next 5 appointments (look out 90 days)    Aug 31, 2021 11:30 AM  (Arrive by 11:15 AM)  SHORT with Samantha Quintana CNP  [facility] ([facility] ) [address]  [place] [address]  [phone]   Sep 21, 2021 11:00 AM  (Arrive by 10:45 AM)  Office Visit with Samantha Quintana CNP  [facility] ([facility] ) [address]  [place] [address]  [phone]            Reason for Disposition    [1] Shingles rash already diagnosed and [2] taking antiviral medication    Additional Information    Negative: Difficult to awaken or acting confused (e.g., disoriented, slurred speech)    Negative: Sounds like a life-threatening emergency to the triager    Negative: [1] Localized rash AND [2] doesn't match the SYMPTOMS of shingles    Negative: [1] Back pain AND [2] doesn't match the SYMPTOMS of shingles    Negative: Shingles Vaccine (Recombinant Zoster Vaccine; RZV; Shingrix), questions about    Negative: Patient sounds very sick or weak to the triager    Negative: [1] Shingles rash (matches SYMPTOMS) AND [2] weak immune system (e.g., HIV positive,  cancer chemotherapy, chronic steroid treatment, splenectomy) AND [3] NOT taking antiviral medication    Negative: Shingles rash on the eyelid or tip of the nose    Negative: [1] Shingles rash of face AND [2] eye pain or blurred vision    Negative: [1] Shingles rash of face AND [2] facial weakness    Negative: [1] Shingles rash of face or ear AND [2] earache or ringing in the ear    Negative: [1] Shingles rash AND [2] spots start appearing other places on body    Negative: Fever > 100.4 F (38.0 C)    Answer Assessment - Initial  "Assessment Questions  1. APPEARANCE of RASH: \"Describe the rash.\"   Red blistering   2. LOCATION: \"Where is the rash located?\"       back  3. ONSET: \"When did the rash start?\"       3 days  4. ITCHING: \"Does the rash itch?\" If so, ask: \"How bad is the itch?\"  (Scale 1-10; or mild, moderate, severe)      Moderate itching  5. PAIN: \"Does the rash hurt?\" If so, ask: \"How bad is the pain?\"  (Scale 1-10; or mild, moderate, severe)      6 pain  6. OTHER SYMPTOMS: \"Do you have any other symptoms?\" (e.g., fever)      No fever at this time does report chills  7. PREGNANCY: \"Is there any chance you are pregnant?\" \"When was your last menstrual period?\"      no    Protocols used: SHINGLES-A-AH      "

## 2021-08-25 NOTE — TELEPHONE ENCOUNTER
Call returned from patient, concerned with the appearance of the shingles rash.     Patient concerned with the rash turning into cellulitis.     Reports the area is warm to touch and reports a huge round ball under the rash area on mid spine under bra strap. Difficult for patient to see.     Please reach out to patient, no open appt's    Patient is scheduled from previous nurse for next week for UC follow up     Next 5 appointments (look out 90 days)    Aug 31, 2021 11:30 AM  (Arrive by 11:15 AM)  SHORT with Samantha Quintana CNP  [facility] ([facility] ) [address]  [place] [address]  [phone]   Sep 21, 2021 11:00 AM  (Arrive by 10:45 AM)  Office Visit with Samantha Quintana CNP  [facility] ([facility] ) [address]  [place] [address]  [phone]        Patient can be reached at [phone]

## 2021-09-21 ENCOUNTER — TRANSFERRED RECORDS (OUTPATIENT)
Dept: HEALTH INFORMATION MANAGEMENT | Facility: CLINIC | Age: 36
End: 2021-09-21

## 2021-09-21 ENCOUNTER — OFFICE VISIT (OUTPATIENT)
Dept: FAMILY MEDICINE | Facility: OTHER | Age: 36
End: 2021-09-21
Attending: NURSE PRACTITIONER
Payer: COMMERCIAL

## 2021-09-21 VITALS
OXYGEN SATURATION: 98 % | WEIGHT: 237.3 LBS | TEMPERATURE: 98.1 F | SYSTOLIC BLOOD PRESSURE: 107 MMHG | DIASTOLIC BLOOD PRESSURE: 62 MMHG | RESPIRATION RATE: 20 BRPM | BODY MASS INDEX: 37.17 KG/M2 | HEART RATE: 71 BPM

## 2021-09-21 DIAGNOSIS — F33.1 MODERATE EPISODE OF RECURRENT MAJOR DEPRESSIVE DISORDER (H): ICD-10-CM

## 2021-09-21 DIAGNOSIS — F41.9 ANXIETY: ICD-10-CM

## 2021-09-21 DIAGNOSIS — Z23 IMMUNIZATION DUE: Primary | ICD-10-CM

## 2021-09-21 DIAGNOSIS — F17.200 NICOTINE DEPENDENCE, UNCOMPLICATED, UNSPECIFIED NICOTINE PRODUCT TYPE: ICD-10-CM

## 2021-09-21 DIAGNOSIS — R09.89 CHEST CONGESTION: ICD-10-CM

## 2021-09-21 DIAGNOSIS — Z00.00 ROUTINE GENERAL MEDICAL EXAMINATION AT A HEALTH CARE FACILITY: ICD-10-CM

## 2021-09-21 PROBLEM — G89.29 CHRONIC DENTAL PAIN: Status: ACTIVE | Noted: 2021-07-26

## 2021-09-21 PROBLEM — K08.9 CHRONIC DENTAL PAIN: Status: ACTIVE | Noted: 2021-07-26

## 2021-09-21 LAB
ALBUMIN SERPL-MCNC: 3.4 G/DL (ref 3.4–5)
ALBUMIN UR-MCNC: NEGATIVE MG/DL
ALP SERPL-CCNC: 157 U/L (ref 40–150)
ALT SERPL W P-5'-P-CCNC: 98 U/L (ref 0–50)
AMPHETAMINES UR QL: NOT DETECTED
ANION GAP SERPL CALCULATED.3IONS-SCNC: 5 MMOL/L (ref 3–14)
APPEARANCE UR: CLEAR
AST SERPL W P-5'-P-CCNC: 63 U/L (ref 0–45)
BARBITURATES UR QL SCN: NOT DETECTED
BASOPHILS # BLD AUTO: 0 10E3/UL (ref 0–0.2)
BASOPHILS NFR BLD AUTO: 0 %
BENZODIAZ UR QL SCN: NOT DETECTED
BILIRUB SERPL-MCNC: 0.5 MG/DL (ref 0.2–1.3)
BILIRUB UR QL STRIP: NEGATIVE
BUN SERPL-MCNC: 10 MG/DL (ref 7–30)
BUPRENORPHINE UR QL: NOT DETECTED
CALCIUM SERPL-MCNC: 8.8 MG/DL (ref 8.5–10.1)
CANNABINOIDS UR QL: NOT DETECTED
CHLORIDE BLD-SCNC: 107 MMOL/L (ref 94–109)
CHOLEST SERPL-MCNC: 169 MG/DL
CO2 SERPL-SCNC: 26 MMOL/L (ref 20–32)
COCAINE UR QL SCN: NOT DETECTED
COLOR UR AUTO: YELLOW
CREAT SERPL-MCNC: 0.75 MG/DL (ref 0.52–1.04)
D-METHAMPHET UR QL: NOT DETECTED
EOSINOPHIL # BLD AUTO: 0.4 10E3/UL (ref 0–0.7)
EOSINOPHIL NFR BLD AUTO: 6 %
ERYTHROCYTE [DISTWIDTH] IN BLOOD BY AUTOMATED COUNT: 13 % (ref 10–15)
FASTING STATUS PATIENT QL REPORTED: YES
GFR SERPL CREATININE-BSD FRML MDRD: >90 ML/MIN/1.73M2
GLUCOSE BLD-MCNC: 87 MG/DL (ref 70–99)
GLUCOSE UR STRIP-MCNC: NEGATIVE MG/DL
HCT VFR BLD AUTO: 36.3 % (ref 35–47)
HDLC SERPL-MCNC: 65 MG/DL
HGB BLD-MCNC: 12.3 G/DL (ref 11.7–15.7)
HGB UR QL STRIP: NEGATIVE
HOLD SPECIMEN: NORMAL
KETONES UR STRIP-MCNC: NEGATIVE MG/DL
LDLC SERPL CALC-MCNC: 95 MG/DL
LEUKOCYTE ESTERASE UR QL STRIP: NEGATIVE
LYMPHOCYTES # BLD AUTO: 1 10E3/UL (ref 0.8–5.3)
LYMPHOCYTES NFR BLD AUTO: 16 %
MCH RBC QN AUTO: 31.5 PG (ref 26.5–33)
MCHC RBC AUTO-ENTMCNC: 33.9 G/DL (ref 31.5–36.5)
MCV RBC AUTO: 93 FL (ref 78–100)
METHADONE UR QL SCN: NOT DETECTED
MONOCYTES # BLD AUTO: 0.5 10E3/UL (ref 0–1.3)
MONOCYTES NFR BLD AUTO: 7 %
NEUTROPHILS # BLD AUTO: 4.4 10E3/UL (ref 1.6–8.3)
NEUTROPHILS NFR BLD AUTO: 70 %
NITRATE UR QL: NEGATIVE
NONHDLC SERPL-MCNC: 104 MG/DL
OPIATES UR QL SCN: NOT DETECTED
OXYCODONE UR QL SCN: NOT DETECTED
PCP UR QL SCN: NOT DETECTED
PH UR STRIP: 5.5 [PH] (ref 5–7)
PLATELET # BLD AUTO: 207 10E3/UL (ref 150–450)
POTASSIUM BLD-SCNC: 4.4 MMOL/L (ref 3.4–5.3)
PROPOXYPH UR QL: NOT DETECTED
PROT SERPL-MCNC: 7.2 G/DL (ref 6.8–8.8)
RBC # BLD AUTO: 3.91 10E6/UL (ref 3.8–5.2)
SODIUM SERPL-SCNC: 138 MMOL/L (ref 133–144)
SP GR UR STRIP: 1.01 (ref 1–1.03)
TRICYCLICS UR QL SCN: NOT DETECTED
TRIGL SERPL-MCNC: 47 MG/DL
TSH SERPL DL<=0.005 MIU/L-ACNC: 1.55 MU/L (ref 0.4–4)
UROBILINOGEN UR STRIP-ACNC: 0.2 E.U./DL
WBC # BLD AUTO: 6.3 10E3/UL (ref 4–11)

## 2021-09-21 PROCEDURE — 84443 ASSAY THYROID STIM HORMONE: CPT | Mod: ZL | Performed by: NURSE PRACTITIONER

## 2021-09-21 PROCEDURE — 85025 COMPLETE CBC W/AUTO DIFF WBC: CPT | Mod: ZL | Performed by: NURSE PRACTITIONER

## 2021-09-21 PROCEDURE — 82040 ASSAY OF SERUM ALBUMIN: CPT | Mod: ZL | Performed by: NURSE PRACTITIONER

## 2021-09-21 PROCEDURE — 82465 ASSAY BLD/SERUM CHOLESTEROL: CPT | Mod: ZL | Performed by: NURSE PRACTITIONER

## 2021-09-21 PROCEDURE — 80306 DRUG TEST PRSMV INSTRMNT: CPT | Mod: ZL | Performed by: NURSE PRACTITIONER

## 2021-09-21 PROCEDURE — U0003 INFECTIOUS AGENT DETECTION BY NUCLEIC ACID (DNA OR RNA); SEVERE ACUTE RESPIRATORY SYNDROME CORONAVIRUS 2 (SARS-COV-2) (CORONAVIRUS DISEASE [COVID-19]), AMPLIFIED PROBE TECHNIQUE, MAKING USE OF HIGH THROUGHPUT TECHNOLOGIES AS DESCRIBED BY CMS-2020-01-R: HCPCS | Mod: ZL | Performed by: NURSE PRACTITIONER

## 2021-09-21 PROCEDURE — 36415 COLL VENOUS BLD VENIPUNCTURE: CPT | Mod: ZL | Performed by: NURSE PRACTITIONER

## 2021-09-21 PROCEDURE — 81003 URINALYSIS AUTO W/O SCOPE: CPT | Mod: ZL | Performed by: NURSE PRACTITIONER

## 2021-09-21 PROCEDURE — 99395 PREV VISIT EST AGE 18-39: CPT | Performed by: NURSE PRACTITIONER

## 2021-09-21 RX ORDER — NICOTINE 21 MG/24HR
1 PATCH, TRANSDERMAL 24 HOURS TRANSDERMAL EVERY 24 HOURS
Qty: 30 PATCH | Refills: 0 | Status: SHIPPED | OUTPATIENT
Start: 2021-09-21 | End: 2021-11-09

## 2021-09-21 ASSESSMENT — ANXIETY QUESTIONNAIRES
3. WORRYING TOO MUCH ABOUT DIFFERENT THINGS: SEVERAL DAYS
2. NOT BEING ABLE TO STOP OR CONTROL WORRYING: NOT AT ALL
7. FEELING AFRAID AS IF SOMETHING AWFUL MIGHT HAPPEN: NOT AT ALL
5. BEING SO RESTLESS THAT IT IS HARD TO SIT STILL: SEVERAL DAYS
6. BECOMING EASILY ANNOYED OR IRRITABLE: NOT AT ALL
GAD7 TOTAL SCORE: 4
1. FEELING NERVOUS, ANXIOUS, OR ON EDGE: SEVERAL DAYS
4. TROUBLE RELAXING: SEVERAL DAYS
IF YOU CHECKED OFF ANY PROBLEMS ON THIS QUESTIONNAIRE, HOW DIFFICULT HAVE THESE PROBLEMS MADE IT FOR YOU TO DO YOUR WORK, TAKE CARE OF THINGS AT HOME, OR GET ALONG WITH OTHER PEOPLE: SOMEWHAT DIFFICULT

## 2021-09-21 ASSESSMENT — PAIN SCALES - GENERAL: PAINLEVEL: MILD PAIN (2)

## 2021-09-21 ASSESSMENT — PATIENT HEALTH QUESTIONNAIRE - PHQ9: SUM OF ALL RESPONSES TO PHQ QUESTIONS 1-9: 5

## 2021-09-21 NOTE — LETTER
[facility] [place] [facility]  09/21/21  Patient: Julia Fierro  YOB: 1985  Medical Record Number: [id number]                                                                                  Non-Opioid Controlled Substance Agreement    This is an agreement between you and your provider regarding safe and appropriate use of controlled substances prescribed by your care team. Controlled substances are?medicines that can cause physical and mental dependence (abuse).     There are strict laws about having and using these medicines. We here at [facility] are  committed to working with you in your efforts to get better. To support you in this work, we'll help you schedule regular office appointments for medicine refills. If we must cancel or change your appointment for any reason, we'll make sure you have enough medicine to last until your next appointment.     As a Provider, I will:     Listen carefully to your concerns while treating you with respect.     Recommend a treatment plan that I believe is in your best interest and may involve therapies other than medicine.      Talk with you often about the possible benefits and the risk of harm of any medicine that we prescribe for you.    Assess the safety of this medicine and check how well it works.      Provide a plan on how to taper (discontinue or go off) using this medicine if the decision is made to stop its use.      ::  As a Patient, I understand controlled substances:       Are prescribed by my care provider to help me function or work and manage my condition(s).?    Are strong medicines and can cause serious side effects.       Need to be taken exactly as prescribed.?Combining controlled substances with certain medicines or chemicals (such as illegal drugs, alcohol, sedatives, sleeping pills, and benzodiazepines) can be dangerous or even fatal.? If I stop taking my medicines suddenly, I may have severe withdrawal symptoms.     The risks, benefits, and  side effects of these medicine(s) were explained to me. I agree that:    1. I will take part in other treatments as advised by my care team. This may be psychiatry or counseling, physical therapy, behavioral therapy, group treatment or a referral to specialist.    2. I will keep all my appointments and understand this is part of the monitoring of controlled substances.?My care team may require an office visit for EVERY controlled substance refill. If I miss appointments or don t follow instructions, my care team may stop my medicine    3. I will take my medicines as prescribed. I will not change the dose or schedule unless my care team tells me to. There will be no refills if I run out early.      4. I may be asked to come to the clinic and complete a urine drug test or complete a pill count. If I don t give a urine sample or participate in a pill count, the care team may stop my medicine.    5. I will only receive controlled substance prescriptions from this clinic. If I am treated by another provider, I will tell them that I am taking controlled substances and that I have a treatment agreement with this provider. I will inform my [facility] care team within one business day if I am given a prescription for any controlled substance by another healthcare provider. My [facility] care team can contact other providers and pharmacists about my use of any medicines.    6. It is up to me to make sure that I don't run out of my medicines on weekends or holidays.?If my care team is willing to refill my prescription without a visit, I must request refills only during office hours. Refills may take up to 3 business days to process. I will use one pharmacy to fill all my controlled substance prescriptions. I will notify the clinic about any changes to my insurance or medicine availability.    7. I am responsible for my prescriptions. If the medicine/prescription is lost, stolen or destroyed, it will not be  replaced.?I also agree not to share controlled substance medicines with anyone.     8. I am aware I should not use any illegal or recreational drugs. I agree not to drink alcohol unless my care team says I can.     9. If I enroll in the [place] Medical Cannabis program, I will tell my care team before my next refill.    10. I will tell my care team right away if I become pregnant, have a new medical problem treated outside of my regular clinic, or have a change in my medicines.     11. I understand that this medicine can affect my thinking, judgment and reaction time.? Alcohol and drugs affect the brain and body, which can affect the safety of my driving. Being under the influence of alcohol or drugs can affect my decision-making, behaviors, personal safety and the safety of others. Driving while impaired (DWI) can occur if a person is driving, operating or in physical control of a car, motorcycle, boat, snowmobile, ATV, motorbike, off-road vehicle or any other motor vehicle ([place] Statute 169[place].20). I understand the risk if I choose to drive or operate any vehicle or machinery.    I understand that if I do not follow any of the conditions above, my prescriptions or treatment may be stopped or changed.   I agree that my provider, clinic care team and pharmacy may work with any city, state or federal law enforcement agency that investigates the misuse, sale or other diversion of my controlled medicine. I will allow my provider to discuss my care with, or share a copy of, this agreement with any other treating provider, pharmacy or emergency room where I receive care.     I have read this agreement and have asked questions about anything I did not understand.    ________________________________________________________  Patient Signature - Julia Fierro     ___________________                   Date     ________________________________________________________  Provider Signature - Samantha Quintana, CNP        ___________________                   Date     ________________________________________________________  Witness Signature (required if provider not present while patient signing)          ___________________                   Date

## 2021-09-21 NOTE — LETTER
[facility] [place] [facility]  09/21/21  Patient: Julia Fierro  YOB: 1985  Medical Record Number: [id number]                                                                                  Non-Opioid Controlled Substance Agreement    This is an agreement between you and your provider regarding safe and appropriate use of controlled substances prescribed by your care team. Controlled substances are?medicines that can cause physical and mental dependence (abuse).     There are strict laws about having and using these medicines. We here at [facility] are  committed to working with you in your efforts to get better. To support you in this work, we'll help you schedule regular office appointments for medicine refills. If we must cancel or change your appointment for any reason, we'll make sure you have enough medicine to last until your next appointment.     As a Provider, I will:     Listen carefully to your concerns while treating you with respect.     Recommend a treatment plan that I believe is in your best interest and may involve therapies other than medicine.      Talk with you often about the possible benefits and the risk of harm of any medicine that we prescribe for you.    Assess the safety of this medicine and check how well it works.      Provide a plan on how to taper (discontinue or go off) using this medicine if the decision is made to stop its use.      ::  As a Patient, I understand controlled substances:       Are prescribed by my care provider to help me function or work and manage my condition(s).?    Are strong medicines and can cause serious side effects.       Need to be taken exactly as prescribed.?Combining controlled substances with certain medicines or chemicals (such as illegal drugs, alcohol, sedatives, sleeping pills, and benzodiazepines) can be dangerous or even fatal.? If I stop taking my medicines suddenly, I may have severe withdrawal symptoms.     The risks, benefits, and  side effects of these medicine(s) were explained to me. I agree that:    1. I will take part in other treatments as advised by my care team. This may be psychiatry or counseling, physical therapy, behavioral therapy, group treatment or a referral to specialist.    2. I will keep all my appointments and understand this is part of the monitoring of controlled substances.?My care team may require an office visit for EVERY controlled substance refill. If I miss appointments or don t follow instructions, my care team may stop my medicine    3. I will take my medicines as prescribed. I will not change the dose or schedule unless my care team tells me to. There will be no refills if I run out early.      4. I may be asked to come to the clinic and complete a urine drug test or complete a pill count. If I don t give a urine sample or participate in a pill count, the care team may stop my medicine.    5. I will only receive controlled substance prescriptions from this clinic. If I am treated by another provider, I will tell them that I am taking controlled substances and that I have a treatment agreement with this provider. I will inform my [facility] care team within one business day if I am given a prescription for any controlled substance by another healthcare provider. My [facility] care team can contact other providers and pharmacists about my use of any medicines.    6. It is up to me to make sure that I don't run out of my medicines on weekends or holidays.?If my care team is willing to refill my prescription without a visit, I must request refills only during office hours. Refills may take up to 3 business days to process. I will use one pharmacy to fill all my controlled substance prescriptions. I will notify the clinic about any changes to my insurance or medicine availability.    7. I am responsible for my prescriptions. If the medicine/prescription is lost, stolen or destroyed, it will not be  replaced.?I also agree not to share controlled substance medicines with anyone.     8. I am aware I should not use any illegal or recreational drugs. I agree not to drink alcohol unless my care team says I can.     9. If I enroll in the [place] [facility] Cannabis program, I will tell my care team before my next refill.    10. I will tell my care team right away if I become pregnant, have a new medical problem treated outside of my regular clinic, or have a change in my medicines.     11. I understand that this medicine can affect my thinking, judgment and reaction time.? Alcohol and drugs affect the brain and body, which can affect the safety of my driving. Being under the influence of alcohol or drugs can affect my decision-making, behaviors, personal safety and the safety of others. Driving while impaired (DWI) can occur if a person is driving, operating or in physical control of a car, motorcycle, boat, snowmobile, ATV, motorbike, off-road vehicle or any other motor vehicle ([place] Statute 169A.20). I understand the risk if I choose to drive or operate any vehicle or machinery.    I understand that if I do not follow any of the conditions above, my prescriptions or treatment may be stopped or changed.   I agree that my provider, clinic care team and pharmacy may work with any city, state or federal law enforcement agency that investigates the misuse, sale or other diversion of my controlled medicine. I will allow my provider to discuss my care with, or share a copy of, this agreement with any other treating provider, pharmacy or emergency room where I receive care.     I have read this agreement and have asked questions about anything I did not understand.    ________________________________________________________  Patient Signature - Julia Fierro     ___________________                   Date     ________________________________________________________  Provider Signature - Samantha Quintana, CNP        ___________________                   Date     ________________________________________________________  Witness Signature (required if provider not present while patient signing)          ___________________                   Date

## 2021-09-21 NOTE — NURSING NOTE
"Chief Complaint   Patient presents with     Physical     Smoking Cessation       Initial /62 (BP Location: Left arm, Patient Position: Sitting, Cuff Size: Adult Large)   Pulse 71   Temp 98.1  F (36.7  C) (Tympanic)   Resp 20   Wt 107.6 kg (237 lb 4.8 oz)   SpO2 98%   BMI 37.17 kg/m   Estimated body mass index is 37.17 kg/m  as calculated from the following:    Height as of 7/9/21: 1.702 m (5' 7\").    Weight as of this encounter: 107.6 kg (237 lb 4.8 oz).  Medication Reconciliation: complete  Bruna Morrow LPN  "

## 2021-09-21 NOTE — PATIENT INSTRUCTIONS
ASSESSMENT/PLAN:     1. Nicotine dependence, uncomplicated, unspecified nicotine product type  - SMOKING CESSATION COUNSELING   - Nicotine patch prescribed    2. Routine general medical examination at a health care facility  - Comprehensive metabolic panel; Future  - CBC with platelets and differential; Future  - TSH with free T4 reflex; Future  - Lipid Profile (Chol, Trig, HDL, LDL calc); Future  - *UA reflex to Microscopic and Culture - [place] [facility]; Future  - Comprehensive metabolic panel  - CBC with platelets and differential  - TSH with free T4 reflex  - Lipid Profile (Chol, Trig, HDL, LDL calc)  - UA reflex to Microscopic and Culture - [place] [facility]      3. Moderate episode of recurrent major depressive disorder (H)  - Continue plan of care    4. Anxiety  - Continue plan of care    5. Chest congestion  - Symptomatic COVID-19 Virus (Coronavirus) by PCR; Future      Preventive Health Recommendations  Female Ages 26 - 39  Yearly exam:   See your health care provider every year in order to    Review health changes.     Discuss preventive care.      Review your medicines if you your doctor has prescribed any.    Until age 30: Get a Pap test every three years (more often if you have had an abnormal result).    After age 30: Talk to your doctor about whether you should have a Pap test every 3 years or have a Pap test with HPV screening every 5 years.   You do not need a Pap test if your uterus was removed (hysterectomy) and you have not had cancer.  You should be tested each year for STDs (sexually transmitted diseases), if you're at risk.   Talk to your provider about how often to have your cholesterol checked.  If you are at risk for diabetes, you should have a diabetes test (fasting glucose).  Shots: Get a flu shot each year. Get a tetanus shot every 10 years.   Nutrition:     Eat at least 5 servings of fruits and vegetables each day.    Eat whole-grain bread, whole-wheat pasta and brown rice instead  of white grains and rice.    Get adequate Calcium and Vitamin D.     Lifestyle    Exercise at least 150 minutes a week (30 minutes a day, 5 days of the week). This will help you control your weight and prevent disease.    Limit alcohol to one drink per day.    No smoking.     Wear sunscreen to prevent skin cancer.    See your dentist every six months for an exam and cleaning.
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
## 2021-09-21 NOTE — RESULT ENCOUNTER NOTE
Stable labs other than LFT elevation  Recheck LFTs 3 weeks    [facility]  [phone]

## 2021-09-22 LAB — SARS-COV-2 RNA RESP QL NAA+PROBE: NEGATIVE

## 2021-09-22 ASSESSMENT — ANXIETY QUESTIONNAIRES: GAD7 TOTAL SCORE: 4

## 2021-10-14 ENCOUNTER — TRANSFERRED RECORDS (OUTPATIENT)
Dept: HEALTH INFORMATION MANAGEMENT | Facility: CLINIC | Age: 36
End: 2021-10-14

## 2021-11-04 ENCOUNTER — TRANSFERRED RECORDS (OUTPATIENT)
Dept: HEALTH INFORMATION MANAGEMENT | Facility: CLINIC | Age: 36
End: 2021-11-04

## 2021-11-09 ENCOUNTER — OFFICE VISIT (OUTPATIENT)
Dept: FAMILY MEDICINE | Facility: OTHER | Age: 36
End: 2021-11-09
Attending: NURSE PRACTITIONER
Payer: COMMERCIAL

## 2021-11-09 VITALS
BODY MASS INDEX: 38.45 KG/M2 | HEART RATE: 66 BPM | HEIGHT: 67 IN | OXYGEN SATURATION: 98 % | TEMPERATURE: 97.7 F | SYSTOLIC BLOOD PRESSURE: 112 MMHG | WEIGHT: 245 LBS | DIASTOLIC BLOOD PRESSURE: 80 MMHG

## 2021-11-09 DIAGNOSIS — R74.8 ELEVATED LIVER ENZYMES: Primary | ICD-10-CM

## 2021-11-09 DIAGNOSIS — F12.10 CANNABIS ABUSE: ICD-10-CM

## 2021-11-09 LAB
ALBUMIN SERPL-MCNC: 3.5 G/DL (ref 3.4–5)
ALP SERPL-CCNC: 97 U/L (ref 40–150)
ALT SERPL W P-5'-P-CCNC: 17 U/L (ref 0–50)
ANION GAP SERPL CALCULATED.3IONS-SCNC: 4 MMOL/L (ref 3–14)
AST SERPL W P-5'-P-CCNC: 14 U/L (ref 0–45)
BILIRUB SERPL-MCNC: 0.4 MG/DL (ref 0.2–1.3)
BUN SERPL-MCNC: 10 MG/DL (ref 7–30)
CALCIUM SERPL-MCNC: 8.9 MG/DL (ref 8.5–10.1)
CHLORIDE BLD-SCNC: 108 MMOL/L (ref 94–109)
CO2 SERPL-SCNC: 26 MMOL/L (ref 20–32)
CREAT SERPL-MCNC: 0.77 MG/DL (ref 0.52–1.04)
GFR SERPL CREATININE-BSD FRML MDRD: >90 ML/MIN/1.73M2
GLUCOSE BLD-MCNC: 90 MG/DL (ref 70–99)
POTASSIUM BLD-SCNC: 4 MMOL/L (ref 3.4–5.3)
PROT SERPL-MCNC: 8 G/DL (ref 6.8–8.8)
SODIUM SERPL-SCNC: 138 MMOL/L (ref 133–144)

## 2021-11-09 PROCEDURE — 90471 IMMUNIZATION ADMIN: CPT

## 2021-11-09 PROCEDURE — G0463 HOSPITAL OUTPT CLINIC VISIT: HCPCS

## 2021-11-09 PROCEDURE — 80307 DRUG TEST PRSMV CHEM ANLYZR: CPT | Mod: ZL | Performed by: NURSE PRACTITIONER

## 2021-11-09 PROCEDURE — 80053 COMPREHEN METABOLIC PANEL: CPT | Mod: ZL | Performed by: NURSE PRACTITIONER

## 2021-11-09 PROCEDURE — G0463 HOSPITAL OUTPT CLINIC VISIT: HCPCS | Mod: 25

## 2021-11-09 PROCEDURE — 99213 OFFICE O/P EST LOW 20 MIN: CPT | Performed by: NURSE PRACTITIONER

## 2021-11-09 PROCEDURE — 36415 COLL VENOUS BLD VENIPUNCTURE: CPT | Mod: ZL | Performed by: NURSE PRACTITIONER

## 2021-11-09 ASSESSMENT — PAIN SCALES - GENERAL: PAINLEVEL: NO PAIN (0)

## 2021-11-09 ASSESSMENT — MIFFLIN-ST. JEOR: SCORE: 1838.94

## 2021-11-09 NOTE — PROGRESS NOTES
Assessment & Plan       Elevated liver enzymes  - Comprehensive metabolic panel    Cannabis abuse  - Cannabinoids qualitative urine      Return in about 6 months (around 5/9/2022).      Samantha Quintana CNP  [facility] - [place] Dum[place] is a 35 year old who presents for the following health issues        Followup For Elevated Liver Enzymes   9/21/21   Alkaline Phosphatase 40 - 150 U/L 157 High            AST 0 - 45 U/L 63 High            ALT 0 - 50 U/L 98 High             Directed to have LFT's rechecked in 3 weeks          ED/UC Followup:  Facility:  [facility]  Date of visit: 11/4/21  Reason for visit: No problem feared complaint unfounded pt requested tox screen   History of Present Illness:  Julia Fierro is a(n) 35 year old female presenting to the emergency department for back pain. She states that she had a history of shingles and has severe anxiety that it may be recurring. She elected to come in to the emergency department due to this. She also notes that she would like a drug test done and she recently had a positive test for THC. She states she has not used THC and is currently on a treatment program for history of cannabis use. She would like to test repeated. She notes she did use CBD ointment recently but states it was several weeks ago. She states that the only source that she could think of that could potentially have exposed her to THC. She is concerned for a possible false positive. She denies any pain that is electrical in nature or shooting around her back in association with the concern for shingles today. She reports that the electrical pain was present during the previous episode of shingles. She denies any headaches lightheadedness, fevers, chills, nausea, vomiting, chest pain, flank pain, diarrhea constipation, blood in her urine, blood in her stool, focal neurologic deficits or further concerning abnormalities associated with today's presentation. She notes no  further complaints.      Urine Carboxy-THC Screen Positive cut-off concentration: 50 ng/mL Positive Abnormal                Patient Active Problem List   Diagnosis     Anxiety     Moderate episode of recurrent major depressive disorder (H)     Paroxysmal supraventricular tachycardia (H)     Myopia of both eyes with astigmatism     PTSD (post-traumatic stress disorder)     Chronic dental pain     Past Surgical History:   Procedure Laterality Date     EXTRACTION(S) DENTAL      wisdom teeth extracted     LAPAROSCOPY  2005    Endometriosis       Social History     Tobacco Use     Smoking status: Current Every Day Smoker     Packs/day: 0.25     Types: Cigarettes     Smokeless tobacco: Never Used     Tobacco comment: tried to quit yes, yr quit 2008, no passive exposure   Substance Use Topics     Alcohol use: Yes     Alcohol/week: 0.0 standard drinks     Comment: occasionally     Family History   Problem Relation Age of Onset     Arthritis Mother      Coronary Artery Disease Mother      Hyperlipidemia Father      Hypertension Father      Other Cancer Father      Diabetes Paternal Grandmother              Current Outpatient Medications   Medication Sig Dispense Refill     LORazepam (ATIVAN) 0.5 MG tablet 1 po BID PRN 30 tablet 1     MAGNESIUM PO Take 250 mg by mouth 2 times daily       digoxin (LANOXIN) 250 MCG tablet Take 1 tablet (0.25 mg) by mouth as needed (take if needed for tachycardia/ HR above 180, go to ED) (Patient not taking: Reported on 11/9/2021) 4 tablet 0         Allergies   Allergen Reactions     Sertraline      Sertraline Hcl      Lightheaded  Shakes        Shakes       Penicillins Rash       Recent Labs   Lab Test 09/21/21  1129 02/22/21  0000 05/13/17  0000 04/18/17  1600 03/31/17  0000   A1C  --   --   --  4.9  --    LDL 95  --   --   --   --    HDL 65  --   --   --   --    TRIG 47  --   --   --   --    ALT 98*  --   --   --  48*   CR 0.75 0.71   < >  --  0.66   GFRESTIMATED >90 >60  --   --   --   "  POTASSIUM 4.4 4.0   < >  --  3.6   TSH 1.55  --   --  0.94  --     < > = values in this interval not displayed.        BP Readings from Last 3 Encounters:   11/09/21 112/80   09/21/21 107/62   07/09/21 124/78    Wt Readings from Last 3 Encounters:   11/09/21 111.1 kg (245 lb)   09/21/21 107.6 kg (237 lb 4.8 oz)   05/12/21 97.5 kg (215 lb)               Review of Systems   Constitutional, HEENT, cardiovascular, pulmonary, gi and gu systems are negative, except as otherwise noted.          Objective    /80 (BP Location: Right arm, Patient Position: Chair, Cuff Size: Adult Large)   Pulse 66   Temp 97.7  F (36.5  C) (Tympanic)   Ht 1.702 m (5' 7\")   Wt 111.1 kg (245 lb)   SpO2 98%   BMI 38.37 kg/m    Body mass index is 38.37 kg/m .       Physical Exam   GENERAL: healthy, alert and no distress  NECK: no adenopathy, no asymmetry, masses, or scars and thyroid normal to palpation  RESP: lungs clear to auscultation - no rales, rhonchi or wheezes  CV: regular rate and rhythm, normal S1 S2, no S3 or S4, no murmur, click or rub, no peripheral edema and peripheral pulses strong  SKIN: no suspicious lesions or rashes  PSYCH: mentation appears normal, affect normal/bright          "

## 2021-11-09 NOTE — NURSING NOTE
"Chief Complaint   Patient presents with     Follow Up     elevated liver enzymes        Initial /80 (BP Location: Right arm, Patient Position: Chair, Cuff Size: Adult Large)   Pulse 66   Temp 97.7  F (36.5  C) (Tympanic)   Ht 1.702 m (5' 7\")   Wt 111.1 kg (245 lb)   SpO2 98%   BMI 38.37 kg/m   Estimated body mass index is 38.37 kg/m  as calculated from the following:    Height as of this encounter: 1.702 m (5' 7\").    Weight as of this encounter: 111.1 kg (245 lb).  Medication Reconciliation: complete  Kelly Nichols LPN    "

## 2021-11-09 NOTE — PATIENT INSTRUCTIONS
Assessment & Plan       Elevated liver enzymes  - Comprehensive metabolic panel    Cannabis abuse  - Cannabinoids qualitative urine      Return in about 6 months (around 5/9/2022).      Samantha Quintana CNP  [facility] [place] [facility]

## 2021-11-10 LAB — CANNABINOIDS UR QL SCN: ABNORMAL

## 2021-11-17 DIAGNOSIS — F41.9 ANXIETY: ICD-10-CM

## 2021-11-19 RX ORDER — LORAZEPAM 0.5 MG/1
TABLET ORAL
Qty: 30 TABLET | Refills: 0 | Status: SHIPPED | OUTPATIENT
Start: 2021-11-19 | End: 2021-12-28

## 2021-12-07 ENCOUNTER — TRANSFERRED RECORDS (OUTPATIENT)
Dept: HEALTH INFORMATION MANAGEMENT | Facility: CLINIC | Age: 36
End: 2021-12-07

## 2021-12-28 ENCOUNTER — OFFICE VISIT (OUTPATIENT)
Dept: FAMILY MEDICINE | Facility: OTHER | Age: 36
End: 2021-12-28
Attending: NURSE PRACTITIONER
Payer: COMMERCIAL

## 2021-12-28 VITALS
OXYGEN SATURATION: 98 % | DIASTOLIC BLOOD PRESSURE: 80 MMHG | BODY MASS INDEX: 38.37 KG/M2 | HEIGHT: 67 IN | TEMPERATURE: 98.1 F | SYSTOLIC BLOOD PRESSURE: 130 MMHG | HEART RATE: 68 BPM

## 2021-12-28 DIAGNOSIS — F43.10 PTSD (POST-TRAUMATIC STRESS DISORDER): ICD-10-CM

## 2021-12-28 DIAGNOSIS — F33.1 MODERATE EPISODE OF RECURRENT MAJOR DEPRESSIVE DISORDER (H): Primary | ICD-10-CM

## 2021-12-28 DIAGNOSIS — F41.9 ANXIETY: ICD-10-CM

## 2021-12-28 PROCEDURE — G0463 HOSPITAL OUTPT CLINIC VISIT: HCPCS

## 2021-12-28 PROCEDURE — 99213 OFFICE O/P EST LOW 20 MIN: CPT | Performed by: NURSE PRACTITIONER

## 2021-12-28 RX ORDER — LORAZEPAM 0.5 MG/1
0.5 TABLET ORAL DAILY PRN
Qty: 30 TABLET | Refills: 1 | Status: SHIPPED | OUTPATIENT
Start: 2021-12-28 | End: 2022-09-29

## 2021-12-28 ASSESSMENT — ANXIETY QUESTIONNAIRES
6. BECOMING EASILY ANNOYED OR IRRITABLE: NOT AT ALL
IF YOU CHECKED OFF ANY PROBLEMS ON THIS QUESTIONNAIRE, HOW DIFFICULT HAVE THESE PROBLEMS MADE IT FOR YOU TO DO YOUR WORK, TAKE CARE OF THINGS AT HOME, OR GET ALONG WITH OTHER PEOPLE: SOMEWHAT DIFFICULT
4. TROUBLE RELAXING: SEVERAL DAYS
GAD7 TOTAL SCORE: 4
5. BEING SO RESTLESS THAT IT IS HARD TO SIT STILL: SEVERAL DAYS
1. FEELING NERVOUS, ANXIOUS, OR ON EDGE: NOT AT ALL
7. FEELING AFRAID AS IF SOMETHING AWFUL MIGHT HAPPEN: SEVERAL DAYS
3. WORRYING TOO MUCH ABOUT DIFFERENT THINGS: SEVERAL DAYS
2. NOT BEING ABLE TO STOP OR CONTROL WORRYING: NOT AT ALL

## 2021-12-28 ASSESSMENT — PAIN SCALES - GENERAL: PAINLEVEL: SEVERE PAIN (6)

## 2021-12-28 ASSESSMENT — PATIENT HEALTH QUESTIONNAIRE - PHQ9: SUM OF ALL RESPONSES TO PHQ QUESTIONS 1-9: 7

## 2021-12-28 NOTE — PROGRESS NOTES
Assessment & Plan        Moderate episode of recurrent major depressive disorder (H)  - Continue plan of care    Anxiety  - LORazepam (ATIVAN) 0.5 MG tablet; Take 1 tablet (0.5 mg) by mouth daily as needed for anxiety    PTSD (post-traumatic stress disorder)  - Continue counseling      25  minutes spent on the date of the encounter doing chart review and patient visit       Return in about 6 months (around 6/28/2022).      Samantha Quintana CNP  [facility] - [place] Dumont is a 36 year old who presents for the following health issues           Depression and Anxiety Follow-Up    How are you doing with your depression since your last visit? Improved     How are you doing with your anxiety since your last visit?  Improved     Are you having other symptoms that might be associated with depression or anxiety? Yes:  see flowsheet    Have you had a significant life event? OTHER: not being bale to be in contact with younger daughter      Do you have any concerns with your use of alcohol or other drugs? No       Social History     Tobacco Use     Smoking status: Current Every Day Smoker     Packs/day: 0.25     Types: Cigarettes     Smokeless tobacco: Never Used     Tobacco comment: tried to quit yes, yr quit 2008, no passive exposure   Substance Use Topics     Alcohol use: Yes     Alcohol/week: 0.0 standard drinks     Comment: occasionally     Drug use: No         PHQ 7/9/2021 9/21/2021 12/28/2021   PHQ-9 Total Score 10 5 7   Q9: Thoughts of better off dead/self-harm past 2 weeks Not at all Not at all Not at all         KULDIP-7 SCORE 7/9/2021 9/21/2021 12/28/2021   Total Score 11 4 4         Last PHQ-9 12/28/2021   1.  Little interest or pleasure in doing things 1   2.  Feeling down, depressed, or hopeless 1   3.  Trouble falling or staying asleep, or sleeping too much 1   4.  Feeling tired or having little energy 2   5.  Poor appetite or overeating 2   6.  Feeling bad about yourself 0   7.  Trouble  "concentrating 0   8.  Moving slowly or restless 0   Q9: Thoughts of better off dead/self-harm past 2 weeks 0   PHQ-9 Total Score 7   Difficulty at work, home, or with people Somewhat difficult           KULDIP-7  12/28/2021   1. Feeling nervous, anxious, or on edge 0   2. Not being able to stop or control worrying 0   3. Worrying too much about different things 1   4. Trouble relaxing 1   5. Being so restless that it is hard to sit still 1   6. Becoming easily annoyed or irritable 0   7. Feeling afraid, as if something awful might happen 1   KULDIP-7 Total Score 4   If you checked any problems, how difficult have they made it for you to do your work, take care of things at home, or get along with other people? Somewhat difficult           How many servings of fruits and vegetables do you eat daily?  4 or more    On average, how many sweetened beverages do you drink each day (Examples: soda, juice, sweet tea, etc.  Do NOT count diet or artificially sweetened beverages)?   6    How many days per week do you exercise enough to make your heart beat faster? 4    How many minutes a day do you exercise enough to make your heart beat faster? 10 - 19    How many days per week do you miss taking your medication? 0        Review of Systems   Constitutional, HEENT, cardiovascular, pulmonary, gi and gu systems are negative, except as otherwise noted.          Objective    /80 (BP Location: Right arm, Patient Position: Chair, Cuff Size: Adult Large)   Pulse 68   Temp 98.1  F (36.7  C) (Tympanic)   Ht 1.702 m (5' 7\")   SpO2 98%   BMI 38.37 kg/m    Body mass index is 38.37 kg/m .         Physical Exam   GENERAL: healthy, alert and no distress  NECK: no adenopathy, no asymmetry, masses, or scars and thyroid normal to palpation  RESP: lungs clear to auscultation - no rales, rhonchi or wheezes  CV: regular rate and rhythm, normal S1 S2, no S3 or S4, no murmur, click or rub, no peripheral edema and peripheral pulses " strong  ABDOMEN: soft, nontender, no hepatosplenomegaly, no masses and bowel sounds normal  PSYCH: mentation appears normal, affect normal/bright

## 2021-12-28 NOTE — NURSING NOTE
"Chief Complaint   Patient presents with     Depression     Anxiety       Initial /84 (BP Location: Right arm, Patient Position: Chair, Cuff Size: Adult Large)   Pulse 68   Temp 98.1  F (36.7  C) (Tympanic)   Ht 1.702 m (5' 7\")   SpO2 98%   BMI 38.37 kg/m   Estimated body mass index is 38.37 kg/m  as calculated from the following:    Height as of this encounter: 1.702 m (5' 7\").    Weight as of 11/9/21: 111.1 kg (245 lb).  Medication Reconciliation: complete  Kelly Nichols LPN    "

## 2021-12-28 NOTE — PATIENT INSTRUCTIONS
Assessment & Plan         Moderate episode of recurrent major depressive disorder (H)  - Continue plan of care    Anxiety  - LORazepam (ATIVAN) 0.5 MG tablet; Take 1 tablet (0.5 mg) by mouth daily as needed for anxiety    PTSD (post-traumatic stress disorder)  - Continue counseling      25  minutes spent on the date of the encounter doing chart review and patient visit       Return in about 6 months (around 6/28/2022).      Samantha Quintana CNP  [facility] [place] [facility]

## 2021-12-29 ASSESSMENT — ANXIETY QUESTIONNAIRES: GAD7 TOTAL SCORE: 4

## 2022-02-17 ENCOUNTER — TELEPHONE (OUTPATIENT)
Dept: FAMILY MEDICINE | Facility: OTHER | Age: 37
End: 2022-02-17
Payer: COMMERCIAL

## 2022-02-17 NOTE — TELEPHONE ENCOUNTER
Patient called with history of positive COVID one month ago. Patient states she was seen in UC last week and diagnosed with pneumonia. Patient completed Z-emir yesterday. Patient reports constant upper left chest pain all day to day and shortness of breath. Nurse advised patient to be evaluated in the ER. Patient verbalized understanding.

## 2022-02-24 ENCOUNTER — TELEPHONE (OUTPATIENT)
Dept: FAMILY MEDICINE | Facility: OTHER | Age: 37
End: 2022-02-24
Payer: COMMERCIAL

## 2022-02-24 NOTE — TELEPHONE ENCOUNTER
1:14 PM    Reason for Call: OVERBOOK    Patient is having the following symptoms: needing an appt  for  Severe   Depression 1 weeks. Patient states she is not suicidal at this time. Would like to be seen 02/25/2022.    The patient is requesting an appointment for Overbook with Hemalatha Cordon.    Was an appointment offered for this call? Yes  If yes : Appointment type              Date   02/28/2022 not soon enough    Preferred method for responding to this message: Telephone Call  What is your phone number ? [phone]    If we cannot reach you directly, may we leave a detailed response at the number you provided? Yes    Can this message wait until your PCP/provider returns, if unavailable today? YES, Provider is in today.    Dee Rose

## 2022-02-25 ENCOUNTER — OFFICE VISIT (OUTPATIENT)
Dept: FAMILY MEDICINE | Facility: OTHER | Age: 37
End: 2022-02-25
Attending: NURSE PRACTITIONER
Payer: COMMERCIAL

## 2022-02-25 VITALS
RESPIRATION RATE: 20 BRPM | SYSTOLIC BLOOD PRESSURE: 108 MMHG | OXYGEN SATURATION: 98 % | TEMPERATURE: 96.8 F | DIASTOLIC BLOOD PRESSURE: 66 MMHG | BODY MASS INDEX: 44 KG/M2 | HEART RATE: 72 BPM | WEIGHT: 280.9 LBS

## 2022-02-25 DIAGNOSIS — F33.1 MODERATE EPISODE OF RECURRENT MAJOR DEPRESSIVE DISORDER (H): Primary | ICD-10-CM

## 2022-02-25 DIAGNOSIS — F41.9 ANXIETY: ICD-10-CM

## 2022-02-25 PROCEDURE — G0463 HOSPITAL OUTPT CLINIC VISIT: HCPCS

## 2022-02-25 PROCEDURE — 99214 OFFICE O/P EST MOD 30 MIN: CPT | Performed by: NURSE PRACTITIONER

## 2022-02-25 RX ORDER — ALBUTEROL SULFATE 90 UG/1
2 AEROSOL, METERED RESPIRATORY (INHALATION)
COMMUNITY
Start: 2022-02-11 | End: 2024-06-06

## 2022-02-25 ASSESSMENT — ANXIETY QUESTIONNAIRES
4. TROUBLE RELAXING: SEVERAL DAYS
7. FEELING AFRAID AS IF SOMETHING AWFUL MIGHT HAPPEN: SEVERAL DAYS
IF YOU CHECKED OFF ANY PROBLEMS ON THIS QUESTIONNAIRE, HOW DIFFICULT HAVE THESE PROBLEMS MADE IT FOR YOU TO DO YOUR WORK, TAKE CARE OF THINGS AT HOME, OR GET ALONG WITH OTHER PEOPLE: VERY DIFFICULT
3. WORRYING TOO MUCH ABOUT DIFFERENT THINGS: SEVERAL DAYS
2. NOT BEING ABLE TO STOP OR CONTROL WORRYING: SEVERAL DAYS
GAD7 TOTAL SCORE: 5
6. BECOMING EASILY ANNOYED OR IRRITABLE: SEVERAL DAYS
1. FEELING NERVOUS, ANXIOUS, OR ON EDGE: NOT AT ALL
5. BEING SO RESTLESS THAT IT IS HARD TO SIT STILL: NOT AT ALL

## 2022-02-25 ASSESSMENT — PATIENT HEALTH QUESTIONNAIRE - PHQ9: SUM OF ALL RESPONSES TO PHQ QUESTIONS 1-9: 9

## 2022-02-25 ASSESSMENT — PAIN SCALES - GENERAL: PAINLEVEL: NO PAIN (0)

## 2022-02-25 NOTE — NURSING NOTE
"Chief Complaint   Patient presents with     Depression     Anxiety       Initial /66 (BP Location: Left arm, Patient Position: Sitting, Cuff Size: Adult Large)   Pulse 72   Temp 96.8  F (36  C) (Tympanic)   Resp 20   Wt 127.4 kg (280 lb 14.4 oz)   SpO2 98%   BMI 44.00 kg/m   Estimated body mass index is 44 kg/m  as calculated from the following:    Height as of 12/28/21: 1.702 m (5' 7\").    Weight as of this encounter: 127.4 kg (280 lb 14.4 oz).  Medication Reconciliation: complete  Bruna Morrow LPN  "

## 2022-02-25 NOTE — LETTER
[facility]  [address]  [address]  [place] [address]  Phone: [phone]    February 25, 2022        Julia [address] [place] [address]          To whom it may concern:    RE: Julia TOM Fierro    May return to work 2/26/22.    Please contact me for questions or concerns.      Sincerely,        Samantha Quintana, CNP

## 2022-02-25 NOTE — PATIENT INSTRUCTIONS
Assessment & Plan          Moderate episode of recurrent major depressive disorder (H)  - Contacts+ Psychotropic      Anxiety  - Contacts+ Psychotropic        Samantha Quintana, CNP  [facility] - [place]

## 2022-02-25 NOTE — PROGRESS NOTES
Assessment & Plan        Moderate episode of recurrent major depressive disorder (H)  - Predictivez Psychotropic    Anxiety  - Predictivez Psychotropic      Samantha Quintana, CNP  [facility] - [place] Dumont is a 36 year old who presents for the following health issues         Depression and Anxiety Follow-Up    How are you doing with your depression since your last visit? Worsened     How are you doing with your anxiety since your last visit?  Worsened     Are you having other symptoms that might be associated with depression or anxiety? No    Have you had a significant life event? OTHER: concerns with child     Do you have any concerns with your use of alcohol or other drugs? No        Social History     Tobacco Use     Smoking status: Former Smoker     Packs/day: 0.25     Types: Cigarettes     Quit date: 2021     Years since quittin.1     Smokeless tobacco: Never Used     Tobacco comment: tried to quit yes, yr quit , no passive exposure   Substance Use Topics     Alcohol use: Yes     Alcohol/week: 0.0 standard drinks     Comment: occasionally     Drug use: No         PHQ 2021   PHQ-9 Total Score 5 7 9   Q9: Thoughts of better off dead/self-harm past 2 weeks Not at all Not at all Not at all         KULDIP-7 SCORE 2021   Total Score 11 4 4         Last PHQ-9 2022   1.  Little interest or pleasure in doing things 1   2.  Feeling down, depressed, or hopeless 2   3.  Trouble falling or staying asleep, or sleeping too much 2   4.  Feeling tired or having little energy 0   5.  Poor appetite or overeating 2   6.  Feeling bad about yourself 1   7.  Trouble concentrating 0   8.  Moving slowly or restless 1   Q9: Thoughts of better off dead/self-harm past 2 weeks 0   PHQ-9 Total Score 9   Difficulty at work, home, or with people Very difficult         KULDIP-7  2021   1. Feeling nervous, anxious, or on edge 0   2. Not being able to stop or  control worrying 0   3. Worrying too much about different things 1   4. Trouble relaxing 1   5. Being so restless that it is hard to sit still 1   6. Becoming easily annoyed or irritable 0   7. Feeling afraid, as if something awful might happen 1   KULDIP-7 Total Score 4   If you checked any problems, how difficult have they made it for you to do your work, take care of things at home, or get along with other people? Somewhat difficult         Patient Active Problem List   Diagnosis     Anxiety     Moderate episode of recurrent major depressive disorder (H)     Paroxysmal supraventricular tachycardia (H)     Myopia of both eyes with astigmatism     PTSD (post-traumatic stress disorder)     Chronic dental pain     Past Surgical History:   Procedure Laterality Date     EXTRACTION(S) DENTAL      wisdom teeth extracted     LAPAROSCOPY      Endometriosis       Social History     Tobacco Use     Smoking status: Former Smoker     Packs/day: 0.25     Types: Cigarettes     Quit date: 2021     Years since quittin.1     Smokeless tobacco: Never Used     Tobacco comment: tried to quit yes, yr quit , no passive exposure   Substance Use Topics     Alcohol use: Yes     Alcohol/week: 0.0 standard drinks     Comment: occasionally     Family History   Problem Relation Age of Onset     Arthritis Mother      Coronary Artery Disease Mother      Hyperlipidemia Father      Hypertension Father      Other Cancer Father      Diabetes Paternal Grandmother              Current Outpatient Medications   Medication Sig Dispense Refill     albuterol (PROAIR HFA/PROVENTIL HFA/VENTOLIN HFA) 108 (90 Base) MCG/ACT inhaler Inhale 2 puffs into the lungs       LORazepam (ATIVAN) 0.5 MG tablet Take 1 tablet (0.5 mg) by mouth daily as needed for anxiety 30 tablet 1     MAGNESIUM PO Take 250 mg by mouth 2 times daily            Allergies   Allergen Reactions     Sertraline      Sertraline Hcl      Lightheaded  Shakes        Shakes        Penicillins Rash         Recent Labs   Lab Test 11/09/21  1508 09/21/21  1129 05/13/17  0000 04/18/17  1600 03/31/17  0000   A1C  --   --   --  4.9  --    LDL  --  95  --   --   --    HDL  --  65  --   --   --    TRIG  --  47  --   --   --    ALT 17 98*  --   --  48*   CR 0.77 0.75   < >  --  0.66   GFRESTIMATED >90 >90   < >  --   --    POTASSIUM 4.0 4.4   < >  --  3.6   TSH  --  1.55  --  0.94  --     < > = values in this interval not displayed.        BP Readings from Last 3 Encounters:   02/25/22 108/66   12/28/21 130/80   11/09/21 112/80    Wt Readings from Last 3 Encounters:   02/25/22 127.4 kg (280 lb 14.4 oz)   11/09/21 111.1 kg (245 lb)   09/21/21 107.6 kg (237 lb 4.8 oz)              Review of Systems   Constitutional, HEENT, cardiovascular, pulmonary, gi and gu systems are negative, except as otherwise noted.        Objective    /66 (BP Location: Left arm, Patient Position: Sitting, Cuff Size: Adult Large)   Pulse 72   Temp 96.8  F (36  C) (Tympanic)   Resp 20   Wt 127.4 kg (280 lb 14.4 oz)   SpO2 98%   BMI 44.00 kg/m    Body mass index is 44 kg/m .         Physical Exam   GENERAL: healthy, alert and no distress  EYES: Eyes grossly normal to inspection, PERRL and conjunctivae and sclerae normal  HENT: ear canals and TM's normal, nose and mouth without ulcers or lesions  NECK: no adenopathy, no asymmetry, masses, or scars and thyroid normal to palpation  RESP: lungs clear to auscultation - no rales, rhonchi or wheezes  CV: regular rate and rhythm, normal S1 S2, no S3 or S4, no murmur, click or rub, no peripheral edema and peripheral pulses strong  PSYCH: anxious and speech pressured

## 2022-02-25 NOTE — LETTER
[facility]  [address]  [place] [address]  Phone: [phone]    February 25, 2022        Julia [address] [place] [address]          To whom it may concern:    RE: Julia Fierro    Please excuse from work 2/24/22 and 2/25/22.    Please contact me for questions or concerns.      Sincerely,        Samantha Quintana, CNP

## 2022-02-26 ASSESSMENT — ANXIETY QUESTIONNAIRES: GAD7 TOTAL SCORE: 5

## 2022-03-02 LAB — SCANNED LAB RESULT: NORMAL

## 2022-03-03 NOTE — PROGRESS NOTES
Julia is a 36 year old who is being evaluated via a billable telephone visit.          What phone number would you like to be contacted at? [phone]   How would you like to obtain your AVS? Mail a copy          Assessment & Plan       Moderate episode of recurrent major depressive disorder (H)  - buPROPion (WELLBUTRIN XL) 150 MG 24 hr tablet; Take 1 tablet (150 mg) by mouth every morning    Anxiety  - buPROPion (WELLBUTRIN XL) 150 MG 24 hr tablet; Take 1 tablet (150 mg) by mouth every morning    Morbid obesity (H)  - Nutrition Referral        Return in about 4 weeks (around 2022).      Samantah Quintana, HEMA  [facility] - [place] is a 36 year old who presents for the following health issues       Depression and Anxiety Follow-Up/ [facility] results    How are you doing with your depression since your last visit? No change    How are you doing with your anxiety since your last visit?  Improved     Are you having other symptoms that might be associated with depression or anxiety? No    Have you had a significant life event? Family issues     Do you have any concerns with your use of alcohol or other drugs? No       Obesity  Is requesting a dietary consult      Social History     Tobacco Use     Smoking status: Former Smoker     Packs/day: 0.25     Types: Cigarettes     Quit date: 2021     Years since quittin.1     Smokeless tobacco: Never Used     Tobacco comment: tried to quit yes, yr quit , no passive exposure   Substance Use Topics     Alcohol use: Yes     Alcohol/week: 0.0 standard drinks     Comment: occasionally     Drug use: No         PHQ 2021 2022 3/7/2022   PHQ-9 Total Score 7 9 11   Q9: Thoughts of better off dead/self-harm past 2 weeks Not at all Not at all Not at all         KULDPI-7 SCORE 2021 2022 3/7/2022   Total Score 4 5 3         Last PHQ-9 3/7/2022   1.  Little interest or pleasure in doing things 2   2.  Feeling down, depressed, or  hopeless 2   3.  Trouble falling or staying asleep, or sleeping too much 3   4.  Feeling tired or having little energy 2   5.  Poor appetite or overeating 0   6.  Feeling bad about yourself 2   7.  Trouble concentrating 0   8.  Moving slowly or restless 0   Q9: Thoughts of better off dead/self-harm past 2 weeks 0   PHQ-9 Total Score 11   Difficulty at work, home, or with people Very difficult         KULDIP-7  3/7/2022   1. Feeling nervous, anxious, or on edge 1   2. Not being able to stop or control worrying 0   3. Worrying too much about different things 0   4. Trouble relaxing 0   5. Being so restless that it is hard to sit still 0   6. Becoming easily annoyed or irritable 0   7. Feeling afraid, as if something awful might happen 2   KULDIP-7 Total Score 3   If you checked any problems, how difficult have they made it for you to do your work, take care of things at home, or get along with other people? Very difficult       Patient Active Problem List   Diagnosis     Anxiety     Moderate episode of recurrent major depressive disorder (H)     Paroxysmal supraventricular tachycardia (H)     Myopia of both eyes with astigmatism     PTSD (post-traumatic stress disorder)     Chronic dental pain     Morbid obesity (H)     Past Surgical History:   Procedure Laterality Date     EXTRACTION(S) DENTAL      wisdom teeth extracted     LAPAROSCOPY      Endometriosis       Social History     Tobacco Use     Smoking status: Former Smoker     Packs/day: 0.25     Types: Cigarettes     Quit date: 2021     Years since quittin.1     Smokeless tobacco: Never Used     Tobacco comment: tried to quit yes, yr quit , no passive exposure   Substance Use Topics     Alcohol use: Yes     Alcohol/week: 0.0 standard drinks     Comment: occasionally     Family History   Problem Relation Age of Onset     Arthritis Mother      Coronary Artery Disease Mother      Hyperlipidemia Father      Hypertension Father      Other Cancer Father       "Diabetes Paternal Grandmother              Current Outpatient Medications   Medication Sig Dispense Refill     albuterol (PROAIR HFA/PROVENTIL HFA/VENTOLIN HFA) 108 (90 Base) MCG/ACT inhaler Inhale 2 puffs into the lungs       buPROPion (WELLBUTRIN XL) 150 MG 24 hr tablet Take 1 tablet (150 mg) by mouth every morning 30 tablet 1     LORazepam (ATIVAN) 0.5 MG tablet Take 1 tablet (0.5 mg) by mouth daily as needed for anxiety 30 tablet 1     MAGNESIUM PO Take 250 mg by mouth 2 times daily            Allergies   Allergen Reactions     Sertraline      Sertraline Hcl      Lightheaded  Shakes        Shakes       Penicillins Rash         Recent Labs   Lab Test 11/09/21  1508 09/21/21  1129 05/13/17  0000 04/18/17  1600 03/31/17  0000   A1C  --   --   --  4.9  --    LDL  --  95  --   --   --    HDL  --  65  --   --   --    TRIG  --  47  --   --   --    ALT 17 98*  --   --  48*   CR 0.77 0.75   < >  --  0.66   GFRESTIMATED >90 >90   < >  --   --    POTASSIUM 4.0 4.4   < >  --  3.6   TSH  --  1.55  --  0.94  --     < > = values in this interval not displayed.          BP Readings from Last 3 Encounters:   02/25/22 108/66   12/28/21 130/80   11/09/21 112/80    Wt Readings from Last 3 Encounters:   02/25/22 127.4 kg (280 lb 14.4 oz)   11/09/21 111.1 kg (245 lb)   09/21/21 107.6 kg (237 lb 4.8 oz)              Review of Systems   Constitutional, HEENT, cardiovascular, pulmonary, GI, , musculoskeletal, neuro, skin, endocrine and psych systems are negative, except as otherwise noted.          Objective    Vitals - Patient Reported  Height (Patient Reported): 170.2 cm (5' 7\")  Pain Score: No Pain (0)      Vitals:  No vitals were obtained today due to virtual visit.    Physical Exam   healthy, alert and no distress  PSYCH: Alert and oriented times 3; coherent speech, normal   rate and volume, able to articulate logical thoughts, able   to abstract reason, no tangential thoughts, no hallucinations   or delusions  Her affect is " normal  RESP: No cough, no audible wheezing, able to talk in full sentences  Remainder of exam unable to be completed due to telephone visits       Phone call duration: 12 minutes

## 2022-03-07 ENCOUNTER — VIRTUAL VISIT (OUTPATIENT)
Dept: FAMILY MEDICINE | Facility: OTHER | Age: 37
End: 2022-03-07
Attending: NURSE PRACTITIONER
Payer: COMMERCIAL

## 2022-03-07 DIAGNOSIS — F33.1 MODERATE EPISODE OF RECURRENT MAJOR DEPRESSIVE DISORDER (H): Primary | ICD-10-CM

## 2022-03-07 DIAGNOSIS — E66.01 MORBID OBESITY (H): ICD-10-CM

## 2022-03-07 DIAGNOSIS — F41.9 ANXIETY: ICD-10-CM

## 2022-03-07 PROCEDURE — 99213 OFFICE O/P EST LOW 20 MIN: CPT | Mod: 95 | Performed by: NURSE PRACTITIONER

## 2022-03-07 RX ORDER — BUPROPION HYDROCHLORIDE 150 MG/1
150 TABLET ORAL EVERY MORNING
Qty: 30 TABLET | Refills: 1 | Status: SHIPPED | OUTPATIENT
Start: 2022-03-07 | End: 2022-04-22

## 2022-03-07 ASSESSMENT — ANXIETY QUESTIONNAIRES
GAD7 TOTAL SCORE: 3
4. TROUBLE RELAXING: NOT AT ALL
6. BECOMING EASILY ANNOYED OR IRRITABLE: NOT AT ALL
IF YOU CHECKED OFF ANY PROBLEMS ON THIS QUESTIONNAIRE, HOW DIFFICULT HAVE THESE PROBLEMS MADE IT FOR YOU TO DO YOUR WORK, TAKE CARE OF THINGS AT HOME, OR GET ALONG WITH OTHER PEOPLE: VERY DIFFICULT
7. FEELING AFRAID AS IF SOMETHING AWFUL MIGHT HAPPEN: MORE THAN HALF THE DAYS
3. WORRYING TOO MUCH ABOUT DIFFERENT THINGS: NOT AT ALL
1. FEELING NERVOUS, ANXIOUS, OR ON EDGE: SEVERAL DAYS
5. BEING SO RESTLESS THAT IT IS HARD TO SIT STILL: NOT AT ALL
2. NOT BEING ABLE TO STOP OR CONTROL WORRYING: NOT AT ALL

## 2022-03-07 ASSESSMENT — PATIENT HEALTH QUESTIONNAIRE - PHQ9: SUM OF ALL RESPONSES TO PHQ QUESTIONS 1-9: 11

## 2022-03-07 NOTE — PATIENT INSTRUCTIONS
Assessment & Plan       Moderate episode of recurrent major depressive disorder (H)  - buPROPion (WELLBUTRIN XL) 150 MG 24 hr tablet; Take 1 tablet (150 mg) by mouth every morning    Anxiety  - buPROPion (WELLBUTRIN XL) 150 MG 24 hr tablet; Take 1 tablet (150 mg) by mouth every morning    Morbid obesity (H)  - Nutrition Referral        Return in about 4 weeks (around 4/4/2022).      Samantha Quintana CNP  [facility] - [place]

## 2022-03-07 NOTE — NURSING NOTE
"Chief Complaint   Patient presents with     Depression     Anxiety       Initial There were no vitals taken for this visit. Estimated body mass index is 44 kg/m  as calculated from the following:    Height as of 12/28/21: 1.702 m (5' 7\").    Weight as of 2/25/22: 127.4 kg (280 lb 14.4 oz).  Medication Reconciliation: complete  Bruna Morrow LPN  "

## 2022-03-08 ASSESSMENT — ANXIETY QUESTIONNAIRES: GAD7 TOTAL SCORE: 3

## 2022-04-05 NOTE — PROGRESS NOTES
Assessment & Plan       Anxiety  - Continue therapy  - Please ask your counselor to refer you to psychiatry for a medication evaluation      Moderate episode of recurrent major depressive disorder (H)  - Continue therapy  - Please ask your counselor to refer you to psychiatry for a medication evaluation      Crisis number reviewed        Return in about 3 months (around 2022).        Samantha Quintana CNP  [facility] - [place] is a 36 year old who presents for the following health issues         Depression and Anxiety Follow-Up    How are you doing with your depression since your last visit? Worsened     How are you doing with your anxiety since your last visit?  No change    Are you having other symptoms that might be associated with depression or anxiety? No    Have you had a significant life event? OTHER: issues with child and custody     Do you have any concerns with your use of alcohol or other drugs? No        Social History     Tobacco Use     Smoking status: Former Smoker     Packs/day: 0.25     Types: Cigarettes     Quit date: 2021     Years since quittin.2     Smokeless tobacco: Never Used     Tobacco comment: tried to quit yes, yr quit , no passive exposure   Substance Use Topics     Alcohol use: Yes     Alcohol/week: 0.0 standard drinks     Comment: occasionally     Drug use: No         PHQ 2022 3/7/2022 2022   PHQ-9 Total Score 9 11 7   Q9: Thoughts of better off dead/self-harm past 2 weeks Not at all Not at all Not at all         KULDIP-7 SCORE 2022 3/7/2022 2022   Total Score 5 3 3         Last PHQ-9 2022   1.  Little interest or pleasure in doing things 2   2.  Feeling down, depressed, or hopeless 1   3.  Trouble falling or staying asleep, or sleeping too much 0   4.  Feeling tired or having little energy 1   5.  Poor appetite or overeating 1   6.  Feeling bad about yourself 1   7.  Trouble concentrating 0   8.  Moving slowly or  restless 1   Q9: Thoughts of better off dead/self-harm past 2 weeks 0   PHQ-9 Total Score 7   Difficulty at work, home, or with people Very difficult       KULDIP-7  2022   1. Feeling nervous, anxious, or on edge 0   2. Not being able to stop or control worrying 1   3. Worrying too much about different things 1   4. Trouble relaxing 0   5. Being so restless that it is hard to sit still 0   6. Becoming easily annoyed or irritable 0   7. Feeling afraid, as if something awful might happen 1   KULDIP-7 Total Score 3   If you checked any problems, how difficult have they made it for you to do your work, take care of things at home, or get along with other people? Very difficult     Patient Active Problem List   Diagnosis     Anxiety     Moderate episode of recurrent major depressive disorder (H)     Paroxysmal supraventricular tachycardia (H)     Myopia of both eyes with astigmatism     PTSD (post-traumatic stress disorder)     Chronic dental pain     Morbid obesity (H)     Past Surgical History:   Procedure Laterality Date     EXTRACTION(S) DENTAL      wisdom teeth extracted     LAPAROSCOPY      Endometriosis       Social History     Tobacco Use     Smoking status: Former Smoker     Packs/day: 0.25     Types: Cigarettes     Quit date: 2021     Years since quittin.2     Smokeless tobacco: Never Used     Tobacco comment: tried to quit yes, yr quit , no passive exposure   Substance Use Topics     Alcohol use: Yes     Alcohol/week: 0.0 standard drinks     Comment: occasionally     Family History   Problem Relation Age of Onset     Arthritis Mother      Coronary Artery Disease Mother      Hyperlipidemia Father      Hypertension Father      Other Cancer Father      Diabetes Paternal Grandmother            Current Outpatient Medications   Medication Sig Dispense Refill     albuterol (PROAIR HFA/PROVENTIL HFA/VENTOLIN HFA) 108 (90 Base) MCG/ACT inhaler Inhale 2 puffs into the lungs       buPROPion (WELLBUTRIN  XL) 150 MG 24 hr tablet Take 1 tablet (150 mg) by mouth every morning (Patient taking differently: Take 150 mg by mouth At Bedtime) 30 tablet 1     LORazepam (ATIVAN) 0.5 MG tablet Take 1 tablet (0.5 mg) by mouth daily as needed for anxiety 30 tablet 1     MAGNESIUM PO Take 250 mg by mouth 2 times daily            Allergies   Allergen Reactions     Sertraline      Sertraline Hcl      Lightheaded  Shakes        Shakes       Penicillins Rash       Recent Labs   Lab Test 11/09/21  1508 09/21/21  1129 05/13/17  0000 04/18/17  1600 03/31/17  0000   A1C  --   --   --  4.9  --    LDL  --  95  --   --   --    HDL  --  65  --   --   --    TRIG  --  47  --   --   --    ALT 17 98*  --   --  48*   CR 0.77 0.75   < >  --  0.66   GFRESTIMATED >90 >90   < >  --   --    POTASSIUM 4.0 4.4   < >  --  3.6   TSH  --  1.55  --  0.94  --     < > = values in this interval not displayed.          BP Readings from Last 3 Encounters:   04/11/22 108/62   02/25/22 108/66   12/28/21 130/80    Wt Readings from Last 3 Encounters:   04/11/22 129.7 kg (286 lb)   02/25/22 127.4 kg (280 lb 14.4 oz)   11/09/21 111.1 kg (245 lb)              Review of Systems   CONSTITUTIONAL: NEGATIVE for fever, chills, change in weight  INTEGUMENTARY/SKIN: NEGATIVE for worrisome rashes, moles or lesions  EYES: NEGATIVE for vision changes or irritation  ENT/MOUTH: NEGATIVE for ear, mouth and throat problems  RESP: NEGATIVE for significant cough or SOB  BREAST: NEGATIVE for masses, tenderness or discharge  CV: NEGATIVE for chest pain, palpitations or peripheral edema  GI: NEGATIVE for nausea, abdominal pain, heartburn, or change in bowel habits  : NEGATIVE for frequency, dysuria, or hematuria  MUSCULOSKELETAL:POSITIVE  for back pain lumbar area - similar to area where Shingles eruption was and NEGATIVE for arthralgias, muscle spasm, myalgia and neck pain  NEURO: NEGATIVE for weakness, dizziness or paresthesias  ENDOCRINE: POSITIVE  for cold intolerance and NEGATIVE  for constipation, diarrhea, hair loss, palpitations and weight gain  HEME: NEGATIVE for bleeding problems  PSYCHIATRIC: POSITIVE foranxiety, depressed mood, hopelessness and stress and NEGATIVE forthoughts of hurting someone else and thoughts of self harm          Objective    /62 (BP Location: Right arm, Patient Position: Sitting, Cuff Size: Adult Large)   Pulse 78   Temp 97.9  F (36.6  C) (Tympanic)   Resp 16   Wt 129.7 kg (286 lb)   LMP 04/06/2022 (Approximate)   SpO2 98%   BMI 44.79 kg/m    Body mass index is 44.79 kg/m .       Physical Exam   GENERAL: healthy, alert and no distress  NECK: no adenopathy, no asymmetry, masses, or scars and thyroid normal to palpation  RESP: lungs clear to auscultation - no rales, rhonchi or wheezes  CV: regular rate and rhythm, normal S1 S2, no S3 or S4, no murmur, click or rub, no peripheral edema and peripheral pulses strong  MS: no gross musculoskeletal defects noted, no edema  PSYCH: tearful and anxious

## 2022-04-11 ENCOUNTER — OFFICE VISIT (OUTPATIENT)
Dept: FAMILY MEDICINE | Facility: OTHER | Age: 37
End: 2022-04-11
Attending: NURSE PRACTITIONER
Payer: COMMERCIAL

## 2022-04-11 VITALS
WEIGHT: 286 LBS | RESPIRATION RATE: 16 BRPM | TEMPERATURE: 97.9 F | SYSTOLIC BLOOD PRESSURE: 108 MMHG | OXYGEN SATURATION: 98 % | DIASTOLIC BLOOD PRESSURE: 62 MMHG | BODY MASS INDEX: 44.79 KG/M2 | HEART RATE: 78 BPM

## 2022-04-11 DIAGNOSIS — F41.9 ANXIETY: ICD-10-CM

## 2022-04-11 DIAGNOSIS — F33.1 MODERATE EPISODE OF RECURRENT MAJOR DEPRESSIVE DISORDER (H): Primary | ICD-10-CM

## 2022-04-11 PROCEDURE — G0463 HOSPITAL OUTPT CLINIC VISIT: HCPCS

## 2022-04-11 PROCEDURE — 99213 OFFICE O/P EST LOW 20 MIN: CPT | Performed by: NURSE PRACTITIONER

## 2022-04-11 RX ORDER — LORAZEPAM 0.5 MG/1
0.5 TABLET ORAL DAILY PRN
Qty: 30 TABLET | Refills: 1 | Status: CANCELLED | OUTPATIENT
Start: 2022-04-11

## 2022-04-11 ASSESSMENT — ANXIETY QUESTIONNAIRES
3. WORRYING TOO MUCH ABOUT DIFFERENT THINGS: SEVERAL DAYS
1. FEELING NERVOUS, ANXIOUS, OR ON EDGE: NOT AT ALL
4. TROUBLE RELAXING: NOT AT ALL
GAD7 TOTAL SCORE: 3
IF YOU CHECKED OFF ANY PROBLEMS ON THIS QUESTIONNAIRE, HOW DIFFICULT HAVE THESE PROBLEMS MADE IT FOR YOU TO DO YOUR WORK, TAKE CARE OF THINGS AT HOME, OR GET ALONG WITH OTHER PEOPLE: VERY DIFFICULT
5. BEING SO RESTLESS THAT IT IS HARD TO SIT STILL: NOT AT ALL
6. BECOMING EASILY ANNOYED OR IRRITABLE: NOT AT ALL
2. NOT BEING ABLE TO STOP OR CONTROL WORRYING: SEVERAL DAYS
7. FEELING AFRAID AS IF SOMETHING AWFUL MIGHT HAPPEN: SEVERAL DAYS

## 2022-04-11 ASSESSMENT — PAIN SCALES - GENERAL: PAINLEVEL: MODERATE PAIN (4)

## 2022-04-11 ASSESSMENT — PATIENT HEALTH QUESTIONNAIRE - PHQ9: SUM OF ALL RESPONSES TO PHQ QUESTIONS 1-9: 7

## 2022-04-11 NOTE — PATIENT INSTRUCTIONS
Assessment & Plan       Anxiety  - Continue therapy  - Please ask your counselor to refer you to psychiatry for a medication evaluation      Moderate episode of recurrent major depressive disorder (H)  - Continue therapy  - Please ask your counselor to refer you to psychiatry for a medication evaluation      Crisis number reviewed        Return in about 3 months (around 7/11/2022).      Samantha Quintana, HEMA  [facility] - [place]

## 2022-04-11 NOTE — NURSING NOTE
"Chief Complaint   Patient presents with     Depression     Anxiety       Initial /62 (BP Location: Right arm, Patient Position: Sitting, Cuff Size: Adult Large)   Pulse 78   Temp 97.9  F (36.6  C) (Tympanic)   Resp 16   Wt 129.7 kg (286 lb)   LMP 04/06/2022 (Approximate)   SpO2 98%   BMI 44.79 kg/m   Estimated body mass index is 44.79 kg/m  as calculated from the following:    Height as of 12/28/21: 1.702 m (5' 7\").    Weight as of this encounter: 129.7 kg (286 lb).  Medication Reconciliation: complete  Bruna Morrow LPN  "

## 2022-04-12 ASSESSMENT — ANXIETY QUESTIONNAIRES: GAD7 TOTAL SCORE: 3

## 2022-04-21 DIAGNOSIS — F33.1 MODERATE EPISODE OF RECURRENT MAJOR DEPRESSIVE DISORDER (H): ICD-10-CM

## 2022-04-21 DIAGNOSIS — F41.9 ANXIETY: ICD-10-CM

## 2022-04-22 RX ORDER — BUPROPION HYDROCHLORIDE 150 MG/1
150 TABLET ORAL AT BEDTIME
Qty: 90 TABLET | Refills: 0 | Status: SHIPPED | OUTPATIENT
Start: 2022-04-22 | End: 2022-05-20

## 2022-04-22 NOTE — TELEPHONE ENCOUNTER
buPROPion (WELLBUTRIN XL) 150 MG 24 hr tablet      Last Written Prescription Date:  3-7-22  Last Fill Quantity: 30,   # refills: 1  Last Office Visit: 4-11-22  Future Office visit:    Next 5 appointments (look out 90 days)    Jun 28, 2022  3:45 PM  (Arrive by 3:30 PM)  SHORT with Samantha Quintana CNP  [facility] ([facility] ) [address]  [place] [address]  [phone]   Jul 13, 2022  2:45 PM  (Arrive by 2:30 PM)  SHORT with Samantha Quintana CNP  [facility] ([facility] ) [address]  [place] [address]  [phone]           Routing refill request to provider for review/approval because:   PHQ-9 score less than 5 in past 6 months

## 2022-05-04 ENCOUNTER — TELEPHONE (OUTPATIENT)
Dept: FAMILY MEDICINE | Facility: OTHER | Age: 37
End: 2022-05-04
Payer: COMMERCIAL

## 2022-05-04 DIAGNOSIS — B37.31 CANDIDIASIS OF VAGINA: Primary | ICD-10-CM

## 2022-05-04 NOTE — TELEPHONE ENCOUNTER
Received fax from [facility] inquiring if Samantha Quintana CNP will sign rx for metronidazole 500 mg tablet.     Original prescription was sent to [facility] from Dr. Saba Alcantar on 5/4/22 as patient was in to UC at .     Patients insurance only covers prescriptions from Samantha Quintana CNP.    Please advise.     Dx Vaginitis (patient reported)

## 2022-05-04 NOTE — TELEPHONE ENCOUNTER
Call from patient reporting she was in to UC at  on 5/3/22, was prescribed an antibiotic for vaginitis that was sent to [facility] in [place] and patient is only able to received prescriptions from Samantha Quintana per insurance company.     Requested patient to reach out to [facility] requesting the prescription to be faxed to Samantha Quintana as there is no record of the visit or medication on file.     Patient verbalized understanding.

## 2022-05-05 RX ORDER — METRONIDAZOLE 500 MG/1
500 TABLET ORAL 2 TIMES DAILY
Qty: 14 TABLET | Refills: 0 | Status: SHIPPED | OUTPATIENT
Start: 2022-05-05 | End: 2022-05-12

## 2022-05-13 ENCOUNTER — APPOINTMENT (OUTPATIENT)
Dept: ULTRASOUND IMAGING | Facility: HOSPITAL | Age: 37
End: 2022-05-13
Attending: EMERGENCY MEDICINE
Payer: COMMERCIAL

## 2022-05-13 ENCOUNTER — HOSPITAL ENCOUNTER (EMERGENCY)
Facility: HOSPITAL | Age: 37
Discharge: HOME | End: 2022-05-14
Attending: EMERGENCY MEDICINE | Admitting: EMERGENCY MEDICINE
Payer: COMMERCIAL

## 2022-05-13 DIAGNOSIS — R60.9 EDEMA, UNSPECIFIED TYPE: ICD-10-CM

## 2022-05-13 PROCEDURE — 99284 EMERGENCY DEPT VISIT MOD MDM: CPT | Mod: 25 | Performed by: EMERGENCY MEDICINE

## 2022-05-13 PROCEDURE — 93971 EXTREMITY STUDY: CPT | Mod: TC | Performed by: EMERGENCY MEDICINE

## 2022-05-13 PROCEDURE — 250N000013 HC RX MED GY IP 250 OP 250 PS 637: Performed by: EMERGENCY MEDICINE

## 2022-05-13 PROCEDURE — 99284 EMERGENCY DEPT VISIT MOD MDM: CPT | Mod: 25

## 2022-05-13 PROCEDURE — 93971 EXTREMITY STUDY: CPT | Mod: 26 | Performed by: EMERGENCY MEDICINE

## 2022-05-13 RX ORDER — FUROSEMIDE 40 MG
120 TABLET ORAL ONCE
Status: COMPLETED | OUTPATIENT
Start: 2022-05-13 | End: 2022-05-13

## 2022-05-13 RX ORDER — DEXAMETHASONE 4 MG/1
TABLET ORAL
COMMUNITY
Start: 2022-05-13 | End: 2022-05-20

## 2022-05-13 RX ORDER — METRONIDAZOLE 500 MG/1
500 TABLET ORAL
COMMUNITY
Start: 2022-05-04 | End: 2022-05-20

## 2022-05-13 RX ORDER — IBUPROFEN 600 MG/1
TABLET, FILM COATED ORAL
COMMUNITY
Start: 2022-05-13 | End: 2022-11-23

## 2022-05-13 RX ORDER — AZITHROMYCIN 250 MG/1
TABLET, FILM COATED ORAL
COMMUNITY
Start: 2022-05-13 | End: 2022-05-20

## 2022-05-13 RX ADMIN — FUROSEMIDE 120 MG: 40 TABLET ORAL at 23:59

## 2022-05-14 VITALS
BODY MASS INDEX: 44.79 KG/M2 | DIASTOLIC BLOOD PRESSURE: 78 MMHG | OXYGEN SATURATION: 98 % | SYSTOLIC BLOOD PRESSURE: 133 MMHG | HEART RATE: 71 BPM | WEIGHT: 286 LBS | RESPIRATION RATE: 14 BRPM | TEMPERATURE: 97.9 F

## 2022-05-14 NOTE — ED TRIAGE NOTES
Pt stated bilateral calf pain started yesterday. Today now it also includes right thigh. States no SOB or breathing problems. States no recent illness how ever had surgery at dentist on Monday and is on an antibiotic. States started steroid today. Took 4-61mg aspirin at 1100 and 2-81mg at 1400 today. Stated recent history of vaginosis. States has had diarrhea since starting antibiotic.

## 2022-05-18 ENCOUNTER — TELEPHONE (OUTPATIENT)
Dept: FAMILY MEDICINE | Facility: OTHER | Age: 37
End: 2022-05-18
Payer: COMMERCIAL

## 2022-05-18 NOTE — TELEPHONE ENCOUNTER
Pt calling and needs ED follow up for leg edema. Seen on 5.13.2022.     For her insurance she needs to see PCP. OVERBOOK Friday?    She was taking Lasix 20mg po BID. ED gave enough until Monday. She said this did help.Has been out of this since Monday night. Stay off of this until seen by PCP?    OVERBOOK? Please advise.    Call back [phone]    Julieta Hamilton RN      Emergency Department and Urgent Care Follow-up      Reason for ER/UC visit: edema of legs  o Date seen: 5.13.2022      New or Worsening symptoms:  No they are getting better with water pills       Prescription Received/Picked up from Pharmacy?:  o Medications started?water pills at ED  o Any questions or issues regarding your prescription?:      Follow-up Results or Labs that are pending:      Questions or concerns?:       ER Recommends Follow-up by: 4.16.2022      RN Recommendations:   o Appointment scheduled:    If you start feeling worse, or have any further questions, please feel free to contact Nurse Triage at [phone].  If needing immediate medical attention at any time please call 911/Go to the ER.

## 2022-05-18 NOTE — TELEPHONE ENCOUNTER
Pls schedule with someone tomorrow, or me on Friday    [facility]  [phone]

## 2022-05-20 ENCOUNTER — OFFICE VISIT (OUTPATIENT)
Dept: FAMILY MEDICINE | Facility: OTHER | Age: 37
End: 2022-05-20
Attending: NURSE PRACTITIONER
Payer: COMMERCIAL

## 2022-05-20 VITALS
SYSTOLIC BLOOD PRESSURE: 122 MMHG | HEART RATE: 88 BPM | DIASTOLIC BLOOD PRESSURE: 80 MMHG | OXYGEN SATURATION: 98 % | WEIGHT: 287.6 LBS | BODY MASS INDEX: 45.04 KG/M2 | TEMPERATURE: 97.6 F

## 2022-05-20 DIAGNOSIS — R60.0 LOCALIZED EDEMA: Primary | ICD-10-CM

## 2022-05-20 DIAGNOSIS — F41.9 ANXIETY: ICD-10-CM

## 2022-05-20 DIAGNOSIS — L03.012 CELLULITIS OF FINGER OF LEFT HAND: ICD-10-CM

## 2022-05-20 DIAGNOSIS — F33.1 MODERATE EPISODE OF RECURRENT MAJOR DEPRESSIVE DISORDER (H): ICD-10-CM

## 2022-05-20 LAB
ALBUMIN SERPL-MCNC: 3.1 G/DL (ref 3.4–5)
ALP SERPL-CCNC: 129 U/L (ref 40–150)
ALT SERPL W P-5'-P-CCNC: 80 U/L (ref 0–50)
ANION GAP SERPL CALCULATED.3IONS-SCNC: 5 MMOL/L (ref 3–14)
AST SERPL W P-5'-P-CCNC: 63 U/L (ref 0–45)
BILIRUB SERPL-MCNC: 0.2 MG/DL (ref 0.2–1.3)
BUN SERPL-MCNC: 13 MG/DL (ref 7–30)
CALCIUM SERPL-MCNC: 8.9 MG/DL (ref 8.5–10.1)
CHLORIDE BLD-SCNC: 108 MMOL/L (ref 94–109)
CO2 SERPL-SCNC: 24 MMOL/L (ref 20–32)
CREAT SERPL-MCNC: 0.7 MG/DL (ref 0.52–1.04)
GFR SERPL CREATININE-BSD FRML MDRD: >90 ML/MIN/1.73M2
GLUCOSE BLD-MCNC: 91 MG/DL (ref 70–99)
HOLD SPECIMEN: NORMAL
POTASSIUM BLD-SCNC: 4.2 MMOL/L (ref 3.4–5.3)
PROT SERPL-MCNC: 7.4 G/DL (ref 6.8–8.8)
SODIUM SERPL-SCNC: 137 MMOL/L (ref 133–144)

## 2022-05-20 PROCEDURE — G0463 HOSPITAL OUTPT CLINIC VISIT: HCPCS

## 2022-05-20 PROCEDURE — 99214 OFFICE O/P EST MOD 30 MIN: CPT | Performed by: NURSE PRACTITIONER

## 2022-05-20 PROCEDURE — 36415 COLL VENOUS BLD VENIPUNCTURE: CPT | Mod: ZL | Performed by: NURSE PRACTITIONER

## 2022-05-20 PROCEDURE — 80053 COMPREHEN METABOLIC PANEL: CPT | Mod: ZL | Performed by: NURSE PRACTITIONER

## 2022-05-20 RX ORDER — CLINDAMYCIN HCL 300 MG
300 CAPSULE ORAL 3 TIMES DAILY
Qty: 30 CAPSULE | Refills: 0 | Status: SHIPPED | OUTPATIENT
Start: 2022-05-20 | End: 2022-05-30

## 2022-05-20 RX ORDER — FUROSEMIDE 20 MG
20 TABLET ORAL DAILY
Qty: 30 TABLET | Refills: 1 | Status: SHIPPED | OUTPATIENT
Start: 2022-05-20 | End: 2022-11-23

## 2022-05-20 RX ORDER — BUPROPION HYDROCHLORIDE 150 MG/1
150 TABLET ORAL AT BEDTIME
Qty: 90 TABLET | Refills: 0 | Status: SHIPPED | OUTPATIENT
Start: 2022-05-20 | End: 2022-09-29

## 2022-05-20 ASSESSMENT — PAIN SCALES - GENERAL: PAINLEVEL: MODERATE PAIN (4)

## 2022-05-20 NOTE — PATIENT INSTRUCTIONS
Assessment & Plan        Localized edema  - Comprehensive metabolic panel; Future  - furosemide (LASIX) 20 MG tablet; Take 1 tablet (20 mg) by mouth daily    Moderate episode of recurrent major depressive disorder (H)  - buPROPion (WELLBUTRIN XL) 150 MG 24 hr tablet; Take 1 tablet (150 mg) by mouth At Bedtime    Anxiety  - buPROPion (WELLBUTRIN XL) 150 MG 24 hr tablet; Take 1 tablet (150 mg) by mouth At Bedtime    Cellulitis of finger of left hand  - clindamycin (CLEOCIN) 300 MG capsule; Take 1 capsule (300 mg) by mouth 3 times daily for 10 days    Follow-up as needed      Samantha Quintana, HEMA  [facility] [place] [facility]

## 2022-05-20 NOTE — NURSING NOTE
"Chief Complaint   Patient presents with     Follow up     Edema in both legs       Initial /80 (BP Location: Right arm, Patient Position: Chair, Cuff Size: Adult Large)   Pulse 88   Temp 97.6  F (36.4  C) (Tympanic)   Wt 130.5 kg (287 lb 9.6 oz)   LMP 04/11/2022 (Approximate)   SpO2 98%   BMI 45.04 kg/m   Estimated body mass index is 45.04 kg/m  as calculated from the following:    Height as of 12/28/21: 1.702 m (5' 7\").    Weight as of this encounter: 130.5 kg (287 lb 9.6 oz).  Medication Reconciliation: complete  Jennifer Gutierrez    "

## 2022-05-23 ASSESSMENT — ENCOUNTER SYMPTOMS
FEVER: 0
CHILLS: 0
COUGH: 0
SHORTNESS OF BREATH: 0

## 2022-05-23 NOTE — ED PROVIDER NOTES
ED Provider Note  [facility]      History     Chief Complaint   Patient presents with     Leg Pain     HPI  Julia Fierro is a 36 year old female who is here with bilateral lower extremity swelling.  States been going on for a few days.  Initially both calves now right thigh.  Not on oral contraceptives.  No recent major surgery however did have recent dental work and was put on antibiotic.  No history of DVTs.  No recent long travel.  No fever no chills.  No redness to lower extremities.          Review of Systems   Constitutional: Negative for chills and fever.   Respiratory: Negative for cough and shortness of breath.    All other systems reviewed and are negative.        Physical Exam   BP: 133/78  Pulse: 73  Temp: 97.9  F (36.6  C)  Resp: 16  Weight: 129.7 kg (286 lb)  SpO2: 97 %  Physical Exam  Constitutional:       General: She is not in acute distress.     Appearance: She is not diaphoretic.   HENT:      Head: Normocephalic and atraumatic.      Right Ear: External ear normal.      Left Ear: External ear normal.      Nose: No congestion or rhinorrhea.      Mouth/Throat:      Pharynx: Oropharynx is clear. No oropharyngeal exudate.   Eyes:      General: No scleral icterus.     Pupils: Pupils are equal, round, and reactive to light.   Cardiovascular:      Rate and Rhythm: Normal rate and regular rhythm.      Heart sounds: Normal heart sounds.   Pulmonary:      Effort: No respiratory distress.      Breath sounds: Normal breath sounds.   Abdominal:      General: Bowel sounds are normal.      Palpations: Abdomen is soft.      Tenderness: There is no abdominal tenderness.   Musculoskeletal:         General: No swelling or tenderness.      Cervical back: Normal range of motion and neck supple.      Right lower leg: No edema.      Left lower leg: No edema.   Skin:     General: Skin is warm.      Capillary Refill: Capillary refill takes less than 2 seconds.      Findings: No bruising,  erythema or rash.   Neurological:      Mental Status: Mental status is at baseline.      Cranial Nerves: No cranial nerve deficit.   Psychiatric:         Mood and Affect: Mood normal.         Behavior: Behavior normal.         ED Course      Procedures  Results for orders placed during the hospital encounter of 05/13/22    POC US FOR DVT UNILATERAL LIMITED    Impression  [facility] Procedure Note    Limited Bedside ED Ultrasound for DVT:    PERFORMED BY: Dr. Brandon Aburto MD  INDICATIONS:  leg pain  PROBE: High frequency linear probe  BODY LOCATION:  Both legs  FINDINGS:  Right:  Common femoral vein:  Compressible  Thrombus:  Absent  Superficial femoral vein:  Compressible  Thrombus:  Absent  Popliteal vein: Compressible  Thrombus:  Absent    Left:  Common femoral vein:  Compressible  Thrombus:  Absent  Superficial femoral vein:  Compressible  Thrombus:  Absent  Popliteal vein: Compressible  Thrombus:  Absent    INTERPRETATION:  The common femoral, superficial femoral and popliteal veins were identified and differentiated from the corresponding arteries.  Compression of the vein demonstrated no DVT and no thrombus was visualized in the veins.  IMAGE DOCUMENTATION: Images were archived to PACs system.                     Results for orders placed or performed during the hospital encounter of 05/13/22   POC US FOR DVT UNILATERAL LIMITED     Status: None    Impression    [facility] Procedure Note      Limited Bedside ED Ultrasound for DVT:    PERFORMED BY: Dr. Brandon Aubrto MD  INDICATIONS:  leg pain  PROBE: High frequency linear probe  BODY LOCATION:  Both legs  FINDINGS:    Right:      Common femoral vein:  Compressible    Thrombus:  Absent   Superficial femoral vein:  Compressible    Thrombus:  Absent   Popliteal vein: Compressible    Thrombus:  Absent    Left:      Common femoral vein:  Compressible    Thrombus:  Absent   Superficial femoral vein:  Compressible    Thrombus:  Absent   Popliteal vein:  Compressible    Thrombus:  Absent    INTERPRETATION:  The common femoral, superficial femoral and popliteal veins were identified and differentiated from the corresponding arteries.  Compression of the vein demonstrated no DVT and no thrombus was visualized in the veins.    IMAGE DOCUMENTATION: Images were archived to PACs system.       Medications   furosemide (LASIX) tablet 120 mg (120 mg Oral Given 5/13/22 8358)        Assessments & Plan (with Medical Decision Making)     36-year-old female here with report of bilateral leg swelling.  No ultrasound available at this time due to inadequate staffing, my ultrasound does not reveal obvious DVT.  Encourage patient to come back if swelling worsens.  Due to body habitus unable to appreciate swelling however expressed with the patient that she knows her body and there is likely swelling.  I did prescribe her low-dose Lasix for the weekend and she should follow-up with her primary on Monday.    I have reviewed the nursing notes. I have reviewed the findings, diagnosis, plan and need for follow up with the patient.    Discharge Medication List as of 5/13/2022 11:53 PM          Final diagnoses:   Edema, unspecified type       --    [facility]  5/13/2022     Brandon Aburto MD  05/23/22 4919

## 2022-05-26 ENCOUNTER — HOSPITAL ENCOUNTER (EMERGENCY)
Facility: HOSPITAL | Age: 37
Discharge: HOME | End: 2022-05-26
Attending: NURSE PRACTITIONER | Admitting: NURSE PRACTITIONER
Payer: COMMERCIAL

## 2022-05-26 VITALS
OXYGEN SATURATION: 97 % | HEART RATE: 70 BPM | SYSTOLIC BLOOD PRESSURE: 138 MMHG | RESPIRATION RATE: 18 BRPM | DIASTOLIC BLOOD PRESSURE: 84 MMHG | TEMPERATURE: 97.7 F

## 2022-05-26 DIAGNOSIS — L03.012 PARONYCHIA OF LEFT THUMB: Primary | ICD-10-CM

## 2022-05-26 LAB
BASOPHILS # BLD AUTO: 0 10E3/UL (ref 0–0.2)
BASOPHILS NFR BLD AUTO: 0 %
CRP SERPL-MCNC: 20.9 MG/L (ref 0–8)
EOSINOPHIL # BLD AUTO: 0.3 10E3/UL (ref 0–0.7)
EOSINOPHIL NFR BLD AUTO: 3 %
ERYTHROCYTE [DISTWIDTH] IN BLOOD BY AUTOMATED COUNT: 12.5 % (ref 10–15)
HCT VFR BLD AUTO: 37.8 % (ref 35–47)
HGB BLD-MCNC: 12.9 G/DL (ref 11.7–15.7)
HOLD SPECIMEN: NORMAL
IMM GRANULOCYTES # BLD: 0 10E3/UL
IMM GRANULOCYTES NFR BLD: 0 %
LYMPHOCYTES # BLD AUTO: 1.5 10E3/UL (ref 0.8–5.3)
LYMPHOCYTES NFR BLD AUTO: 15 %
MCH RBC QN AUTO: 29.7 PG (ref 26.5–33)
MCHC RBC AUTO-ENTMCNC: 34.1 G/DL (ref 31.5–36.5)
MCV RBC AUTO: 87 FL (ref 78–100)
MONOCYTES # BLD AUTO: 0.6 10E3/UL (ref 0–1.3)
MONOCYTES NFR BLD AUTO: 6 %
NEUTROPHILS # BLD AUTO: 7.7 10E3/UL (ref 1.6–8.3)
NEUTROPHILS NFR BLD AUTO: 76 %
NRBC # BLD AUTO: 0 10E3/UL
NRBC BLD AUTO-RTO: 0 /100
PLATELET # BLD AUTO: 227 10E3/UL (ref 150–450)
RBC # BLD AUTO: 4.35 10E6/UL (ref 3.8–5.2)
WBC # BLD AUTO: 10.1 10E3/UL (ref 4–11)

## 2022-05-26 PROCEDURE — 85025 COMPLETE CBC W/AUTO DIFF WBC: CPT | Performed by: NURSE PRACTITIONER

## 2022-05-26 PROCEDURE — 999N000104 HC STATISTIC NO CHARGE

## 2022-05-26 PROCEDURE — 86140 C-REACTIVE PROTEIN: CPT | Performed by: NURSE PRACTITIONER

## 2022-05-26 PROCEDURE — 36415 COLL VENOUS BLD VENIPUNCTURE: CPT | Performed by: NURSE PRACTITIONER

## 2022-05-26 PROCEDURE — 10060 I&D ABSCESS SIMPLE/SINGLE: CPT | Performed by: NURSE PRACTITIONER

## 2022-05-26 PROCEDURE — 250N000009 HC RX 250: Performed by: NURSE PRACTITIONER

## 2022-05-26 PROCEDURE — 10060 I&D ABSCESS SIMPLE/SINGLE: CPT

## 2022-05-26 RX ADMIN — Medication 3 ML: at 17:23

## 2022-05-26 ASSESSMENT — ENCOUNTER SYMPTOMS
JOINT SWELLING: 1
FEVER: 0
MYALGIAS: 1
CHILLS: 0
COLOR CHANGE: 1

## 2022-05-26 NOTE — DISCHARGE INSTRUCTIONS
Continue taking clindamycin as prescribed. Tylenol or ibuprofen as needed for pain.     Soak your thumb in warm epsom salts 3 times a day.     Follow up with your doctor if no improvement in symptoms.    Return to emergency department for worsening or concerning symptoms.

## 2022-05-26 NOTE — ED TRIAGE NOTES
Pt presents with c/i painful left thumb onset Wednesday.  Pt denies injury, PCP has evaluated her thumb and prescribed abx. Pt denies fevers.

## 2022-05-26 NOTE — ED PROVIDER NOTES
History     Chief Complaint   Patient presents with     Hand Pain     HPI  Julia Fierro is a 36 year old female who presents ambulatory to urgent care with a friend for concerns of worsening infection to left thumb.  Patient tells me that she has had pain and swelling to her left thumb for about 9 days.  About a week ago she was seen by her primary provider and started on clindamycin for infection to her thumb.  She has been taking clindamycin with no missed doses.  She continues to have pain and swelling with redness to her left thumb worsening.  She tells me that she has pain all the way up to her armpit.  No known fevers at home.  Denies any trauma to her finger prior to pain and swelling starting.    Allergies:  Allergies   Allergen Reactions     Sertraline      Sertraline Hcl      Lightheaded  Shakes        Shakes       Penicillins Rash       Problem List:    Patient Active Problem List    Diagnosis Date Noted     Morbid obesity (H) 03/07/2022     Priority: Medium     Chronic dental pain 07/26/2021     Priority: Medium     PTSD (post-traumatic stress disorder) 07/09/2021     Priority: Medium     Myopia of both eyes with astigmatism 06/08/2021     Priority: Medium     Paroxysmal supraventricular tachycardia (H) 07/24/2017     Priority: Medium     Moderate episode of recurrent major depressive disorder (H) 03/25/2015     Priority: Medium     Anxiety 07/01/2014     Priority: Medium        Past Medical History:    Past Medical History:   Diagnosis Date     Anxiety      Anxiety 7/1/2014     Major depression, recurrent (H) 3/25/2015     Moderate episode of recurrent major depressive disorder (H) 3/25/2015     Panic attacks 7/15/2015     Paroxysmal supraventricular tachycardia (H) 7/24/2017     PTSD (post-traumatic stress disorder) 7/9/2021     Tobacco abuse 7/24/2017       Past Surgical History:    Past Surgical History:   Procedure Laterality Date     EXTRACTION(S) DENTAL      wisdom teeth extracted      LAPAROSCOPY  2005    Endometriosis       Family History:    Family History   Problem Relation Age of Onset     Arthritis Mother      Coronary Artery Disease Mother      Hyperlipidemia Father      Hypertension Father      Other Cancer Father      Diabetes Paternal Grandmother        Social History:  Marital Status:  Single [1]  Social History     Tobacco Use     Smoking status: Former Smoker     Packs/day: 0.25     Types: Cigarettes     Quit date: 2021     Years since quittin.4     Smokeless tobacco: Never Used     Tobacco comment: tried to quit yes, yr quit , no passive exposure   Substance Use Topics     Alcohol use: Yes     Alcohol/week: 0.0 standard drinks     Comment: occasionally     Drug use: No        Medications:    albuterol (PROAIR HFA/PROVENTIL HFA/VENTOLIN HFA) 108 (90 Base) MCG/ACT inhaler  buPROPion (WELLBUTRIN XL) 150 MG 24 hr tablet  clindamycin (CLEOCIN) 300 MG capsule  furosemide (LASIX) 20 MG tablet  LORazepam (ATIVAN) 0.5 MG tablet  MAGNESIUM PO  ibuprofen (ADVIL/MOTRIN) 600 MG tablet          Review of Systems   Constitutional: Negative for chills and fever.   Musculoskeletal: Positive for joint swelling and myalgias.   Skin: Positive for color change.   All other systems reviewed and are negative.      Physical Exam   BP: 138/84  Pulse: 70  Temp: 97.7  F (36.5  C)  Resp: 18  SpO2: 97 %      Physical Exam  Vitals and nursing note reviewed.   Constitutional:       Appearance: Normal appearance. She is not ill-appearing or toxic-appearing.   HENT:      Head: Normocephalic.   Eyes:      Pupils: Pupils are equal, round, and reactive to light.   Cardiovascular:      Rate and Rhythm: Normal rate.   Pulmonary:      Effort: Pulmonary effort is normal.   Musculoskeletal:         General: Swelling present. Normal range of motion.      Left upper arm: No swelling, edema or lacerations.      Left elbow: No swelling or effusion. Normal range of motion.      Left forearm: Tenderness present. No  swelling or edema.      Left wrist: Normal. No swelling. Normal range of motion.      Left hand: Swelling and tenderness present. Normal strength. Normal sensation. Normal capillary refill.      Cervical back: Neck supple.   Skin:     General: Skin is warm and dry.      Capillary Refill: Capillary refill takes less than 2 seconds.      Findings: Erythema present.   Neurological:      Mental Status: She is alert and oriented to person, place, and time.         ED Course                 Procedures  LET applied to left thumb. Small stab incision made to left thumb near nailbed with small amount of purulent drainage observed. Discolored area to pad of thumb was stabbed with a needle with bloody drainage. Patient tolerated well.           Results for orders placed or performed during the hospital encounter of 05/26/22 (from the past 24 hour(s))   CBC with platelets differential    Narrative    The following orders were created for panel order CBC with platelets differential.  Procedure                               Abnormality         Status                     ---------                               -----------         ------                     CBC with platelets and d...[[id number]]                      Final result                 Please view results for these tests on the individual orders.   CRP inflammation   Result Value Ref Range    CRP Inflammation 20.9 (H) 0.0 - 8.0 mg/L   CBC with platelets and differential   Result Value Ref Range    WBC Count 10.1 4.0 - 11.0 10e3/uL    RBC Count 4.35 3.80 - 5.20 10e6/uL    Hemoglobin 12.9 11.7 - 15.7 g/dL    Hematocrit 37.8 35.0 - 47.0 %    MCV 87 78 - 100 fL    MCH 29.7 26.5 - 33.0 pg    MCHC 34.1 31.5 - 36.5 g/dL    RDW 12.5 10.0 - 15.0 %    Platelet Count 227 150 - 450 10e3/uL    % Neutrophils 76 %    % Lymphocytes 15 %    % Monocytes 6 %    % Eosinophils 3 %    % Basophils 0 %    % Immature Granulocytes 0 %    NRBCs per 100 WBC 0 <1 /100    Absolute Neutrophils 7.7 1.6 -  8.3 10e3/uL    Absolute Lymphocytes 1.5 0.8 - 5.3 10e3/uL    Absolute Monocytes 0.6 0.0 - 1.3 10e3/uL    Absolute Eosinophils 0.3 0.0 - 0.7 10e3/uL    Absolute Basophils 0.0 0.0 - 0.2 10e3/uL    Absolute Immature Granulocytes 0.0 <=0.4 10e3/uL    Absolute NRBCs 0.0 10e3/uL   Extra Tube    Narrative    The following orders were created for panel order Extra Tube.  Procedure                               Abnormality         Status                     ---------                               -----------         ------                     Extra Blue Top Tube[[id number]]                              In process                 Extra Red Top Tube[[id number]]                               In process                 Extra Heparinized Syringe[[id number]]                        Final result                 Please view results for these tests on the individual orders.   Extra Heparinized Syringe   Result Value Ref Range    Hold Specimen         Medications   lido-EPINEPHrine-tetracaine (LET) topical gel GEL (3 mLs Topical Given 5/26/22 1723)       Assessments & Plan (with Medical Decision Making)     I have reviewed the nursing notes.    36-year-old female that presented for evaluation of left thumb pain and swelling.  Currently on clindamycin for cellulitis to the left thumb.  Patient has redness around her left thumb nailbed along with some white blanched areas suspicious for pus underneath the skin.  Left thumb swollen in comparison to right.  Distal phalanx of thumb is tender to palpation.  No redness appreciated of the rest of patient's arm.    Lab findings are reassuring without leukocytosis.  Elevated inflammatory marker.  I&D completed to left thumb with small amount of purulent drainage.  Patient tolerated well.    She has paronychia of left thumb.  She will continue taking clindamycin as prescribed.  Recommended warm Epsom salt soaks to left thumb.  Tylenol or ibuprofen as needed for pain.  Strict return precautions not  limited to fever, increased redness or swelling discussed with patient.  She may follow-up with her PCP as needed.    I have reviewed the findings, diagnosis, plan and need for follow up with the patient.  This document was prepared using a combination of typing and voice generated software.  While every attempt was made for accuracy, spelling and grammatical errors may exist.    Discharge Medication List as of 5/26/2022  5:55 PM          Final diagnoses:   Paronychia of left thumb       5/26/2022   [facility]     Zarina Dos Santos CNP  05/26/22 7825

## 2022-05-26 NOTE — ED TRIAGE NOTES
Patient presents to urgent care for left thumb issue onset on Wednesday. Patient  Denies any injury. PCP diagnosed her Friday with cellulitis and she was put on an antibiotic. Patient states she just wants to feel better.  Patient states pain is radiating up to her arm pit. Pain is a constant 4 out of 10.     Triage Assessment     Row Name 05/26/22 5512       Triage Assessment (Adult)    Airway WDL WDL       Respiratory WDL    Respiratory WDL WDL       Cognitive/Neuro/Behavioral WDL    Cognitive/Neuro/Behavioral WDL WDL       [place] Coma Scale    Best Eye Response 4-->(E4) spontaneous    Best Motor Response 6-->(M6) obeys commands    Best Verbal Response 5-->(V5) oriented    [place] Coma Scale Score 15

## 2022-09-29 DIAGNOSIS — F41.9 ANXIETY: ICD-10-CM

## 2022-09-29 DIAGNOSIS — F33.1 MODERATE EPISODE OF RECURRENT MAJOR DEPRESSIVE DISORDER (H): ICD-10-CM

## 2022-09-29 RX ORDER — LORAZEPAM 0.5 MG/1
0.5 TABLET ORAL DAILY PRN
Qty: 30 TABLET | Refills: 0 | Status: SHIPPED | OUTPATIENT
Start: 2022-09-29 | End: 2022-11-23

## 2022-09-29 RX ORDER — BUPROPION HYDROCHLORIDE 150 MG/1
150 TABLET ORAL AT BEDTIME
Qty: 30 TABLET | Refills: 1 | Status: SHIPPED | OUTPATIENT
Start: 2022-09-29 | End: 2022-11-23

## 2022-09-29 NOTE — TELEPHONE ENCOUNTER
Patient is restricted to PCP for medication.     LORazepam (ATIVAN) 0.5 MG tablet      Last Written Prescription Date:  12/28/21  Last Fill Quantity: 30,   # refills: 1  Last Office Visit: 5/20/22  Future Office visit:       Routing refill request to provider for review/approval because:  Drug not on the [facility] or [facility] refill protocol or controlled substance    buPROPion (WELLBUTRIN XL) 150 MG 24 hr tablet      Last Written Prescription Date:  5/20/22  Last Fill Quantity: 90,   # refills: 0

## 2022-10-26 ENCOUNTER — NURSE TRIAGE (OUTPATIENT)
Dept: FAMILY MEDICINE | Facility: OTHER | Age: 37
End: 2022-10-26

## 2022-10-26 NOTE — TELEPHONE ENCOUNTER
S- (SITUATION) :  Patient called for appointment for painful rash.    B- (BACKGROUND):  Patient states rash started last night.    A- (ASSESSMENT):  Patient reports pain being moderate -severe. Patient reports rash being located along spine.  Patient reports fever but is unable to take temperature.    R- (RECOMMENDATIONS):  Patient advised to be seen in UC/ED due to no appointments available in clinic. Patient verbalized understanding.      Reason for Disposition    Shingles suspected (i.e., painful rash, multiple small blisters grouped together in one area of body; dermatomal distribution)    Shingles rash (matches SYMPTOMS) and onset < 72 hours ago (3 days)    Additional Information    Negative: Sounds like a life-threatening emergency to the triager    Negative: Athlete's Foot suspected (i.e., itchy rash between the toes)    Negative: Insect bite(s) suspected    Negative: Jock Itch suspected (i.e., itchy rash on inner thighs near genital area)    Negative: Localized lump (or swelling) without redness or rash    Negative: Poison ivy, oak, or sumac contact suspected    Negative: Rash of female genital area (vulva)    Negative: Rash of male genital area (penis or scrotum)    Negative: Redness of immunization site    Negative: Small spot, skin growth, or mole    Negative: Difficult to awaken or acting confused (e.g., disoriented, slurred speech)    Negative: Sounds like a life-threatening emergency to the triager    Negative: Localized rash and doesn't match the SYMPTOMS of shingles    Negative: Shingles Vaccine (Recombinant Zoster Vaccine; RZV; Shingrix), questions about    Negative: Shingles rash of face and eye pain or blurred vision    Negative: Shingles rash on the eyelid or tip of the nose    Negative: Shingles rash and spots start appearing other places on body    Negative: Shingles rash (matches SYMPTOMS) and weak immune system (e.g., HIV positive, cancer chemotherapy, chronic steroid treatment, splenectomy)  "and NOT taking antiviral medication    Negative: Shingles rash of face and facial weakness    Negative: Shingles rash of face or ear and earache or ringing in the ear    Negative: Fever > 100.4 F  (38.0 C)    Negative: SEVERE pain (e.g., excruciating)    Answer Assessment - Initial Assessment Questions  1. APPEARANCE of RASH: \"Describe the rash.\"       Elevated along spine  2. LOCATION: \"Where is the rash located?\"       Upper to lower back on spine  3. NUMBER: \"How many spots are there?\"       Multiple spots  4. SIZE: \"How big are the spots?\" (Inches, centimeters or compare to size of a coin)       Pencil eraser - dime size  5. ONSET: \"When did the rash start?\"      Last night  6. ITCHING: \"Does the rash itch?\" If Yes, ask: \"How bad is the itch?\"  (Scale 0-10; or none, mild, moderate, severe)      No now  7. PAIN: \"Does the rash hurt?\" If Yes, ask: \"How bad is the pain?\"  (Scale 0-10; or none, mild, moderate, severe)     - NONE (0): no pain     - MILD (1-3): doesn't interfere with normal activities      - MODERATE (4-7): interferes with normal activities or awakens from sleep      - SEVERE (8-10): excruciating pain, unable to do any normal activities      Yes, moderate  8. OTHER SYMPTOMS: \"Do you have any other symptoms?\" (e.g., fever)      Patient believes she has fever but unable to take temp  9. PREGNANCY: \"Is there any chance you are pregnant?\" \"When was your last menstrual period?\"      no    Protocols used: RASH OR REDNESS - UOSOLGIKC-D-OA, SHINGLES (ZOSTER)-A-OH      "

## 2022-10-28 ENCOUNTER — NURSE TRIAGE (OUTPATIENT)
Dept: FAMILY MEDICINE | Facility: OTHER | Age: 37
End: 2022-10-28

## 2022-10-28 NOTE — TELEPHONE ENCOUNTER
"Pt calling and called three days ago for shingles. She has on braline blister as big a nickel.Swelling another this is large.Pain 4/10 but was a 9/10.Has a fever but doesn't know temp.    Has had shingles in the same area in the past. Today new spots starting near moles on her body.     Advised UC-she states she cannot because PCP has to approve medication because she is a restricted patient.    Call back [phone]    Gave [employer] call number for assistance with [employer].      Julieta Hamilton RN      Reason for Disposition    [1] Shingles rash (matches SYMPTOMS) AND [2] weak immune system (e.g., HIV positive, cancer chemotherapy, chronic steroid treatment, splenectomy) AND [3] NOT taking antiviral medication    Additional Information    Negative: Difficult to awaken or acting confused (e.g., disoriented, slurred speech)    Negative: Sounds like a life-threatening emergency to the triager    Negative: [1] Localized rash AND [2] doesn't match the SYMPTOMS of shingles    Negative: [1] Back pain AND [2] doesn't match the SYMPTOMS of shingles    Negative: Shingles vaccine (Recombinant Zoster Vaccine; RZV; Shingrix), questions about    Negative: Patient sounds very sick or weak to the triager    Answer Assessment - Initial Assessment Questions  1. APPEARANCE of RASH: \"Describe the rash.\"       Spine on the same bra line in the back,three days ago,blister is the of nickel  2. LOCATION: \"Where is the rash located?\"       Swelling under the blister  3. ONSET: \"When did the rash start?\"       Three days ago  4. ITCHING: \"Does the rash itch?\" If Yes, ask: \"How bad is the itch?\"  (Scale 1-10; or mild, moderate, severe)      Not yet, and today skin is itchy by moles today  5. PAIN: \"Does the rash hurt?\" If Yes, ask: \"How bad is the pain?\"  (Scale 0-10; or none, mild, moderate, severe)     - NONE (0): no pain     - MILD (1-3): doesn't interfere with normal activities      - MODERATE (4-7): interferes with normal activities or " "awakens from sleep      - SEVERE (8-10): excruciating pain, unable to do any normal activities      Pain gone down 4/10 was a 9/10  6. OTHER SYMPTOMS: \"Do you have any other symptoms?\" (e.g., fever)     Fever doesn't know,nothing is draining  7. PREGNANCY: \"Is there any chance you are pregnant?\" \"When was your last menstrual period?\"      no    Protocols used: SHINGLES (ZOSTER)-A-AH      "

## 2022-10-31 ENCOUNTER — TELEPHONE (OUTPATIENT)
Dept: FAMILY MEDICINE | Facility: OTHER | Age: 37
End: 2022-10-31

## 2022-10-31 NOTE — TELEPHONE ENCOUNTER
Patient states that there is a script for her at [facility] to treat her shingles but she can't pick it up until [facility] approves it.  Please let her know.

## 2022-10-31 NOTE — TELEPHONE ENCOUNTER
Left message for patient to call back to see what uc she went to and whom prescribed medication no notes of uc visit on file

## 2022-10-31 NOTE — TELEPHONE ENCOUNTER
Pt called back and went to .Not sure who prescribed this.[place] [facility] [place]Two [place], a topical cream and a pill.    Please advise.    Julieta Hamilton RN

## 2022-11-01 NOTE — TELEPHONE ENCOUNTER
Talked to [facility] and verbal order for valtrex 1 gram tid times 7 days #21 no refill and they ordered lidocaine 4% gel that was not covered by her insurance and there was no directions so pharmacy checked on coverage for other nothing covered she can buy otc if needed

## 2022-11-22 NOTE — PROGRESS NOTES
Assessment & Plan        Anxiety  - LORazepam (ATIVAN) 0.5 MG tablet; Take 1 tablet (0.5 mg) by mouth daily as needed for anxiety  - buPROPion (WELLBUTRIN XL) 150 MG 24 hr tablet; Take 1 tablet (150 mg) by mouth At Bedtime      Moderate episode of recurrent major depressive disorder (H)  - buPROPion (WELLBUTRIN XL) 150 MG 24 hr tablet; Take 1 tablet (150 mg) by mouth At Bedtime          Return in about 6 months (around 2023).        Samantha Quintana, [facility] - KEYLA Dumont is a 36 year old, presenting for the following health issues:  Depression and Anxiety          Depression and Anxiety Follow-Up    How are you doing with your depression since your last visit? No change    How are you doing with your anxiety since your last visit?  Improved     Are you having other symptoms that might be associated with depression or anxiety? No    Have you had a significant life event? No     Do you have any concerns with your use of alcohol or other drugs? No        Social History     Tobacco Use     Smoking status: Former     Packs/day: 0.25     Types: Cigarettes     Quit date: 2021     Years since quittin.9     Smokeless tobacco: Never     Tobacco comments:     tried to quit yes, yr quit , no passive exposure   Substance Use Topics     Alcohol use: Yes     Alcohol/week: 0.0 standard drinks     Comment: occasionally     Drug use: No         PHQ 3/7/2022 2022 2022   PHQ-9 Total Score 11 7 2   Q9: Thoughts of better off dead/self-harm past 2 weeks Not at all Not at all Not at all         KULDIP-7 SCORE 3/7/2022 2022 2022   Total Score 3 3 3         Last PHQ-9 2022   1.  Little interest or pleasure in doing things 1   2.  Feeling down, depressed, or hopeless 0   3.  Trouble falling or staying asleep, or sleeping too much 1   4.  Feeling tired or having little energy 0   5.  Poor appetite or overeating 0   6.  Feeling bad about yourself 0   7.  Trouble  concentrating 0   8.  Moving slowly or restless 0   Q9: Thoughts of better off dead/self-harm past 2 weeks 0   PHQ-9 Total Score 2   Difficulty at work, home, or with people Not difficult at all           KULDIP-7  2022   1. Feeling nervous, anxious, or on edge 1   2. Not being able to stop or control worrying 0   3. Worrying too much about different things 1   4. Trouble relaxing 0   5. Being so restless that it is hard to sit still 0   6. Becoming easily annoyed or irritable 0   7. Feeling afraid, as if something awful might happen 1   KULDIP-7 Total Score 3   If you checked any problems, how difficult have they made it for you to do your work, take care of things at home, or get along with other people? Not difficult at all           Patient Active Problem List   Diagnosis     Anxiety     Moderate episode of recurrent major depressive disorder (H)     Paroxysmal supraventricular tachycardia (H)     Myopia of both eyes with astigmatism     PTSD (post-traumatic stress disorder)     Morbid obesity (H)     Past Surgical History:   Procedure Laterality Date     EXTRACTION(S) DENTAL      wisdom teeth extracted     LAPAROSCOPY      Endometriosis       Social History     Tobacco Use     Smoking status: Former     Packs/day: 0.25     Types: Cigarettes     Quit date: 2021     Years since quittin.9     Smokeless tobacco: Never     Tobacco comments:     tried to quit yes, yr quit , no passive exposure   Substance Use Topics     Alcohol use: Yes     Alcohol/week: 0.0 standard drinks     Comment: occasionally     Family History   Problem Relation Age of Onset     Arthritis Mother      Coronary Artery Disease Mother      Hyperlipidemia Father      Hypertension Father      Other Cancer Father      Diabetes Paternal Grandmother              Current Outpatient Medications   Medication Sig Dispense Refill     albuterol (PROAIR HFA/PROVENTIL HFA/VENTOLIN HFA) 108 (90 Base) MCG/ACT inhaler Inhale 2 puffs into the  lungs       buPROPion (WELLBUTRIN XL) 150 MG 24 hr tablet Take 1 tablet (150 mg) by mouth At Bedtime 30 tablet 1     LORazepam (ATIVAN) 0.5 MG tablet Take 1 tablet (0.5 mg) by mouth daily as needed for anxiety 30 tablet 0     Allergies   Allergen Reactions     Sertraline      Sertraline Hcl      Lightheaded  Shakes        Shakes       Penicillins Rash     Recent Labs   Lab Test 05/20/22  1509 11/09/21  1508 09/21/21  1129 05/13/17  0000 04/18/17  1600   A1C  --   --   --   --  4.9   LDL  --   --  95  --   --    HDL  --   --  65  --   --    TRIG  --   --  47  --   --    ALT 80* 17 98*  --   --    CR 0.70 0.77 0.75   < >  --    GFRESTIMATED >90 >90 >90   < >  --    POTASSIUM 4.2 4.0 4.4   < >  --    TSH  --   --  1.55  --  0.94    < > = values in this interval not displayed.        BP Readings from Last 3 Encounters:   11/23/22 102/72   05/26/22 138/84   05/20/22 122/80    Wt Readings from Last 3 Encounters:   11/23/22 118.8 kg (261 lb 12.8 oz)   05/20/22 130.5 kg (287 lb 9.6 oz)   05/13/22 129.7 kg (286 lb)                 Review of Systems   Constitutional, HEENT, cardiovascular, pulmonary, gi and gu systems are negative, except as otherwise noted.          Objective    /72 (BP Location: Left arm, Patient Position: Sitting, Cuff Size: Adult Large)   Pulse 106   Temp 98.7  F (37.1  C) (Tympanic)   Resp 18   Wt 118.8 kg (261 lb 12.8 oz)   SpO2 98%   Breastfeeding Yes   BMI 41.00 kg/m    Body mass index is 41 kg/m .         Physical Exam   GENERAL: healthy, alert and no distress  EYES: Eyes grossly normal to inspection, PERRL and conjunctivae and sclerae normal  NECK: no adenopathy, no asymmetry, masses, or scars and thyroid normal to palpation  RESP: lungs clear to auscultation - no rales, rhonchi or wheezes  CV: regular rate and rhythm, normal S1 S2, no S3 or S4, no murmur, click or rub, no peripheral edema and peripheral pulses strong  PSYCH: mentation appears normal, affect normal/bright

## 2022-11-23 ENCOUNTER — OFFICE VISIT (OUTPATIENT)
Dept: FAMILY MEDICINE | Facility: OTHER | Age: 37
End: 2022-11-23
Attending: NURSE PRACTITIONER
Payer: COMMERCIAL

## 2022-11-23 VITALS
WEIGHT: 261.8 LBS | TEMPERATURE: 98.7 F | SYSTOLIC BLOOD PRESSURE: 102 MMHG | OXYGEN SATURATION: 98 % | DIASTOLIC BLOOD PRESSURE: 72 MMHG | BODY MASS INDEX: 41 KG/M2 | RESPIRATION RATE: 18 BRPM | HEART RATE: 106 BPM

## 2022-11-23 DIAGNOSIS — F33.1 MODERATE EPISODE OF RECURRENT MAJOR DEPRESSIVE DISORDER (H): ICD-10-CM

## 2022-11-23 DIAGNOSIS — F41.9 ANXIETY: ICD-10-CM

## 2022-11-23 PROBLEM — G89.29 CHRONIC DENTAL PAIN: Status: RESOLVED | Noted: 2021-07-26 | Resolved: 2022-11-23

## 2022-11-23 PROBLEM — K08.9 CHRONIC DENTAL PAIN: Status: RESOLVED | Noted: 2021-07-26 | Resolved: 2022-11-23

## 2022-11-23 PROCEDURE — 99214 OFFICE O/P EST MOD 30 MIN: CPT | Performed by: NURSE PRACTITIONER

## 2022-11-23 PROCEDURE — G0463 HOSPITAL OUTPT CLINIC VISIT: HCPCS

## 2022-11-23 RX ORDER — BUPROPION HYDROCHLORIDE 150 MG/1
150 TABLET ORAL AT BEDTIME
Qty: 30 TABLET | Refills: 1 | Status: SHIPPED | OUTPATIENT
Start: 2022-11-23 | End: 2023-03-22

## 2022-11-23 RX ORDER — LORAZEPAM 0.5 MG/1
0.5 TABLET ORAL DAILY PRN
Qty: 30 TABLET | Refills: 0 | Status: SHIPPED | OUTPATIENT
Start: 2022-11-23 | End: 2023-05-26

## 2022-11-23 ASSESSMENT — ANXIETY QUESTIONNAIRES
1. FEELING NERVOUS, ANXIOUS, OR ON EDGE: SEVERAL DAYS
7. FEELING AFRAID AS IF SOMETHING AWFUL MIGHT HAPPEN: SEVERAL DAYS
5. BEING SO RESTLESS THAT IT IS HARD TO SIT STILL: NOT AT ALL
2. NOT BEING ABLE TO STOP OR CONTROL WORRYING: NOT AT ALL
3. WORRYING TOO MUCH ABOUT DIFFERENT THINGS: SEVERAL DAYS
6. BECOMING EASILY ANNOYED OR IRRITABLE: NOT AT ALL
GAD7 TOTAL SCORE: 3
IF YOU CHECKED OFF ANY PROBLEMS ON THIS QUESTIONNAIRE, HOW DIFFICULT HAVE THESE PROBLEMS MADE IT FOR YOU TO DO YOUR WORK, TAKE CARE OF THINGS AT HOME, OR GET ALONG WITH OTHER PEOPLE: NOT DIFFICULT AT ALL
GAD7 TOTAL SCORE: 3
4. TROUBLE RELAXING: NOT AT ALL

## 2022-11-23 ASSESSMENT — PAIN SCALES - GENERAL: PAINLEVEL: NO PAIN (0)

## 2022-11-23 ASSESSMENT — PATIENT HEALTH QUESTIONNAIRE - PHQ9: SUM OF ALL RESPONSES TO PHQ QUESTIONS 1-9: 2

## 2022-11-23 NOTE — PATIENT INSTRUCTIONS
Assessment & Plan        Anxiety  - LORazepam (ATIVAN) 0.5 MG tablet; Take 1 tablet (0.5 mg) by mouth daily as needed for anxiety  - buPROPion (WELLBUTRIN XL) 150 MG 24 hr tablet; Take 1 tablet (150 mg) by mouth At Bedtime      Moderate episode of recurrent major depressive disorder (H)  - buPROPion (WELLBUTRIN XL) 150 MG 24 hr tablet; Take 1 tablet (150 mg) by mouth At Bedtime          Return in about 6 months (around 5/23/2023).        Samantha Quintana, CNP  [facility] [place] [facility]

## 2023-01-12 ENCOUNTER — TELEPHONE (OUTPATIENT)
Dept: FAMILY MEDICINE | Facility: OTHER | Age: 38
End: 2023-01-12

## 2023-01-12 NOTE — TELEPHONE ENCOUNTER
Terrie with [facility] calling and requesting an insurance referral.     Reports patient was seen  at [facility] on 10/28/22.    Terrie is requesting an insurance referral for this date due to patient's insurance being restricted to [facility].     NPI: [id number]  Dx code B02.9    Terrie's phone number is [phone]    Fax number is [phone] ATTN: Terrie LOPEZ

## 2023-01-17 ENCOUNTER — TELEPHONE (OUTPATIENT)
Dept: FAMILY MEDICINE | Facility: OTHER | Age: 38
End: 2023-01-17

## 2023-01-17 NOTE — TELEPHONE ENCOUNTER
4:00 PM    Reason for Call: Phone Call    Description: Terrie for [facility] is going to need an Insurance referral to see GYN at [facility] because pt is a restricted medical. Fax [phone] ATTN: Terrie JOHN Falcon also has sent a message regarding this pt back on 1-12-23 for an UC visit on 10-28-22 and has yet to hear back on that.       Preferred method for responding to this message: Telephone Call  What is your phone number ? [phone]    If we cannot reach you directly, may we leave a detailed response at the number you provided? Yes    Can this message wait until your PCP/provider returns, if available today? Chastity Bruno

## 2023-02-22 ENCOUNTER — TELEPHONE (OUTPATIENT)
Dept: FAMILY MEDICINE | Facility: OTHER | Age: 38
End: 2023-02-22

## 2023-02-22 NOTE — TELEPHONE ENCOUNTER
12:32 PM    Reason for Call: Phone Call    Description: patient has a rash on her face, arms, belly and hands.  She needs Samantha Quintana to approve a visit to urgent care due to her restricted care.    Was an appointment offered for this call? Yes  If yes : Appointment type              Date    Preferred method for responding to this message: Telephone Call  What is your phone number ? [phone]    If we cannot reach you directly, may we leave a detailed response at the number you provided? Yes    Can this message wait until your PCP/provider returns, if available today? YES, No provider is out today    CARLO CLINTON

## 2023-03-06 ENCOUNTER — TELEPHONE (OUTPATIENT)
Dept: FAMILY MEDICINE | Facility: OTHER | Age: 38
End: 2023-03-06

## 2023-03-06 NOTE — TELEPHONE ENCOUNTER
1:42 PM    Reason for Call: Phone Call    Description: Patient was seen in Urgent Care at [facility] she has restricted insurance needs an approval from Samantha Quintana to get the antibiotics filled she is using [facility] in [place] and she also needs approval for seen in Urgent Care for her insurance.      Was an appointment offered for this call? No  If yes : Appointment type              Date    Preferred method for responding to this message: Telephone Call  What is your phone number ? [phone]    If we cannot reach you directly, may we leave a detailed response at the number you provided? Yes    Can this message wait until your PCP/provider returns, if available today? YES, No    Enedina Stearns

## 2023-03-08 ENCOUNTER — TELEPHONE (OUTPATIENT)
Dept: FAMILY MEDICINE | Facility: OTHER | Age: 38
End: 2023-03-08

## 2023-03-08 NOTE — TELEPHONE ENCOUNTER
March 8, 2023    Terrie from [facility] would like call back from Samantha Quintana or nurse, as patient was seen in Urgent care 3/3/23 and is a restricted patient, insurance referral needed.  NPI: [id number] for facility   Diagnosis code R30.0  Jeanna Bledsoe was provider for visit- NPI: [id number]  Phone # for Terrie: [phone]  Fax # for Referral: [phone]

## 2023-03-20 DIAGNOSIS — F33.1 MODERATE EPISODE OF RECURRENT MAJOR DEPRESSIVE DISORDER (H): ICD-10-CM

## 2023-03-20 DIAGNOSIS — F41.9 ANXIETY: ICD-10-CM

## 2023-03-20 NOTE — TELEPHONE ENCOUNTER
Bupropion (Wellbutrin XL) 150 MG 24 hr tablet    Last Written Prescription Date:  11/23/2022  Last Fill Quantity: 30,   # refills: 1  Last Office Visit: 11/23/2022

## 2023-03-22 RX ORDER — BUPROPION HYDROCHLORIDE 150 MG/1
150 TABLET ORAL AT BEDTIME
Qty: 30 TABLET | Refills: 1 | Status: SHIPPED | OUTPATIENT
Start: 2023-03-22 | End: 2023-05-05

## 2023-04-12 ENCOUNTER — TELEPHONE (OUTPATIENT)
Dept: FAMILY MEDICINE | Facility: OTHER | Age: 38
End: 2023-04-12

## 2023-04-12 DIAGNOSIS — K04.7 DENTAL INFECTION: Primary | ICD-10-CM

## 2023-04-12 RX ORDER — AZITHROMYCIN 250 MG/1
TABLET, FILM COATED ORAL
Qty: 6 TABLET | Refills: 0 | Status: SHIPPED | OUTPATIENT
Start: 2023-04-12 | End: 2023-04-17

## 2023-04-12 NOTE — TELEPHONE ENCOUNTER
Patient was at the Dentist on Thursday and the dentist gave her a script for a Z-pack.  She is restricted to [facility] for filling meds.  What should she do with this script?      [phone]

## 2023-05-04 DIAGNOSIS — F33.1 MODERATE EPISODE OF RECURRENT MAJOR DEPRESSIVE DISORDER (H): ICD-10-CM

## 2023-05-04 DIAGNOSIS — F41.9 ANXIETY: ICD-10-CM

## 2023-05-04 NOTE — TELEPHONE ENCOUNTER
LORazepam (ATIVAN) 0.5 MG tablet      Last Written Prescription Date:  11/23/22  Last Fill Quantity: 30,   # refills: 0  Last Office Visit: 2/24/23  Future Office visit:    Next 5 appointments (look out 90 days)    May 26, 2023  2:45 PM  (Arrive by 2:30 PM)  SHORT with Samantha Quintana CNP  [facility] [address]  [facility] [address]  [phone]           Routing refill request to provider for review/approval because:  Drug not on the [facility], [facility] or [facility] refill protocol or controlled substance    buPROPion (WELLBUTRIN XL) 150 MG 24 hr tablet      Last Written Prescription Date:  3/22/23  Last Fill Quantity: 30,   # refills: 1

## 2023-05-05 RX ORDER — BUPROPION HYDROCHLORIDE 150 MG/1
150 TABLET ORAL AT BEDTIME
Qty: 30 TABLET | Refills: 1 | Status: SHIPPED | OUTPATIENT
Start: 2023-05-05 | End: 2024-06-06

## 2023-05-05 RX ORDER — LORAZEPAM 0.5 MG/1
0.5 TABLET ORAL DAILY PRN
Qty: 30 TABLET | Refills: 0 | OUTPATIENT
Start: 2023-05-05

## 2023-05-17 ENCOUNTER — TELEPHONE (OUTPATIENT)
Dept: FAMILY MEDICINE | Facility: OTHER | Age: 38
End: 2023-05-17

## 2023-05-17 DIAGNOSIS — F41.9 ANXIETY: ICD-10-CM

## 2023-05-17 NOTE — TELEPHONE ENCOUNTER
"4:12 PM    Reason for Call: Phone Call    Description: Patient called in asking if Samantha had signed and sent her prescription for Atavan yet as \"it is very much needed\". Please call patient back.    Was an appointment offered for this call? No  If yes : Appointment type              Date    Preferred method for responding to this message: Telephone Call  What is your phone number ? [phone]    If we cannot reach you directly, may we leave a detailed response at the number you provided? Yes    Can this message wait until your PCP/provider returns, if available today? N/A, provider in clinic today.    Idalmis Lucio    "

## 2023-05-18 RX ORDER — LORAZEPAM 0.5 MG/1
0.5 TABLET ORAL DAILY PRN
Qty: 30 TABLET | Refills: 0 | OUTPATIENT
Start: 2023-05-18

## 2023-06-14 ENCOUNTER — TELEPHONE (OUTPATIENT)
Dept: FAMILY MEDICINE | Facility: OTHER | Age: 38
End: 2023-06-14

## 2023-06-14 NOTE — TELEPHONE ENCOUNTER
Spoke with patient and notified of message below. Patient states she is required through her insurance to get a referral from you to see them. Pls advise.

## 2023-06-14 NOTE — TELEPHONE ENCOUNTER
1:21 PM    Reason for Call: Phone Call    Description: patient would like a referral to [facility] in [place] for her mental health appt tomorrow.  She would like a call back.  She refused an appt to come in and talk to the provider.    Was an appointment offered for this call? No  If yes : Appointment type              Date    Preferred method for responding to this message: Telephone Call  What is your phone number ? [phone]    If we cannot reach you directly, may we leave a detailed response at the number you provided? Yes    Can this message wait until your PCP/provider returns, if available today? YES, No provider is in the clinic    CARLO CLINTON

## 2023-06-14 NOTE — TELEPHONE ENCOUNTER
No showed all appts since November  She should not need a referral to [facility]  She can call and schedule an appt    [facility]  [phone]

## 2023-09-27 ENCOUNTER — HOSPITAL ENCOUNTER (EMERGENCY)
Facility: CLINIC | Age: 38
Discharge: HOME | End: 2023-09-27
Attending: NURSE PRACTITIONER | Admitting: NURSE PRACTITIONER
Payer: COMMERCIAL

## 2023-09-27 VITALS
SYSTOLIC BLOOD PRESSURE: 140 MMHG | WEIGHT: 250 LBS | BODY MASS INDEX: 39.24 KG/M2 | OXYGEN SATURATION: 95 % | RESPIRATION RATE: 18 BRPM | HEART RATE: 69 BPM | DIASTOLIC BLOOD PRESSURE: 90 MMHG | HEIGHT: 67 IN

## 2023-09-27 DIAGNOSIS — F41.9 ANXIETY: ICD-10-CM

## 2023-09-27 PROCEDURE — 99283 EMERGENCY DEPT VISIT LOW MDM: CPT | Performed by: NURSE PRACTITIONER

## 2023-09-27 PROCEDURE — 250N000013 HC RX MED GY IP 250 OP 250 PS 637: Performed by: NURSE PRACTITIONER

## 2023-09-27 PROCEDURE — 99283 EMERGENCY DEPT VISIT LOW MDM: CPT

## 2023-09-27 RX ORDER — LORAZEPAM 1 MG/1
1 TABLET ORAL ONCE
Status: COMPLETED | OUTPATIENT
Start: 2023-09-27 | End: 2023-09-27

## 2023-09-27 RX ADMIN — LORAZEPAM 1 MG: 1 TABLET ORAL at 14:35

## 2023-09-27 ASSESSMENT — ACTIVITIES OF DAILY LIVING (ADL): ADLS_ACUITY_SCORE: 35

## 2023-09-27 NOTE — ED PROVIDER NOTES
History     Chief Complaint   Patient presents with    Anxiety     Patient verbalized just got out of residential today has h/o SVT, felt like she had SVT last night and radiating jaw R jaw pain.  Had major painc attack when leaving residential called EMSReports h/o anxiety usually takes PRN ativan but does not have any.  Reports anxiety about 2/10.  Denies chest pain, SOA, or nausea.       HPI  Julia Fierro is a 37 year old female who presents from residential for evaluation of anxiety.  Patient lives [place] in [place], [place] about [place] north of here.  She was arrested under a warrant and brought to this area.  She has been in residential since 9/22.  She was discharged from the residential today and brought here by EMS for evaluation of anxiety.  Patient reports having panic attack when leaving the residential.  She also tells me she had an episode of SVT last night while she was in the residential and she was able to resolve this by bearing down.  She has previously been prescribed Wellbutrin, and tells me she has bought them from someone so she is continuing to take Wellbutrin daily but just not getting prescriptions.  She has had several no-show appointments with her clinic provider over the last few months.  She was seen in the emergency department in [facility] on 9/22 for residential clearance and anxiety.  She was given a dose of Ativan which she states helped her anxiety.  She tells me she has been taking as needed Ativan and typically 30 tablets will last her for several months.  She had a last refill noted on record in January of this year.  Refills have been declined for her Wellbutrin due to failed follow-up appointments.  She denies taking any other medications.  She denies alcohol use.  She denies illicit drug use, but was reported using marijuana mixed with Hash when she was seen in the emergency department on 9/22.  Patient tells me she was using CBD that she got at the smoke shop and was told it had Hash in it.    Patient currently denies  any chest pain or shortness of breath.  Denies fever or chills.  Denies nausea or vomiting.  PDMP Review         Value Time User    State PDMP site checked  Yes 2023  2:01 PM Ioana Malone APRN CNP            Allergies:  Allergies   Allergen Reactions    Sertraline     Sertraline Hcl      Lightheaded  Shakes        Shakes      Penicillins Rash       Problem List:    Patient Active Problem List    Diagnosis Date Noted    Morbid obesity (H) 2022     Priority: Medium    PTSD (post-traumatic stress disorder) 2021     Priority: Medium    Myopia of both eyes with astigmatism 2021     Priority: Medium    Paroxysmal supraventricular tachycardia (H) 2017     Priority: Medium    Moderate episode of recurrent major depressive disorder (H) 2015     Priority: Medium    Anxiety 2014     Priority: Medium        Past Medical History:    Past Medical History:   Diagnosis Date    Anxiety     Anxiety 2014    Major depression, recurrent (H) 3/25/2015    Moderate episode of recurrent major depressive disorder (H) 3/25/2015    Panic attacks 7/15/2015    Paroxysmal supraventricular tachycardia (H) 2017    PTSD (post-traumatic stress disorder) 2021    Tobacco abuse 2017       Past Surgical History:    Past Surgical History:   Procedure Laterality Date    EXTRACTION(S) DENTAL      wisdom teeth extracted    LAPAROSCOPY      Endometriosis       Family History:    Family History   Problem Relation Age of Onset    Arthritis Mother     Coronary Artery Disease Mother     Hyperlipidemia Father     Hypertension Father     Other Cancer Father     Diabetes Paternal Grandmother        Social History:  Marital Status:  Single [1]  Social History     Tobacco Use    Smoking status: Former     Packs/day: 0.25     Types: Cigarettes     Quit date: 2021     Years since quittin.7    Smokeless tobacco: Never    Tobacco comments:     tried to quit yes, yr quit , no passive  "exposure   Substance Use Topics    Alcohol use: Yes     Alcohol/week: 0.0 standard drinks of alcohol     Comment: occasionally    Drug use: No        Medications:    albuterol (PROAIR HFA/PROVENTIL HFA/VENTOLIN HFA) 108 (90 Base) MCG/ACT inhaler  buPROPion (WELLBUTRIN XL) 150 MG 24 hr tablet          Review of Systems  As mentioned above in the history present illness. All other systems were reviewed and are negative.    Physical Exam   BP: (!) 140/90  Pulse: 69  Resp: 18  Height: 170.2 cm (5' 7\")  Weight: 113.4 kg (250 lb)  SpO2: 95 %      Physical Exam  Constitutional:       General: She is in acute distress (tearful).      Appearance: She is well-developed. She is not ill-appearing.   HENT:      Head: Normocephalic and atraumatic.      Right Ear: External ear normal.      Left Ear: External ear normal.      Nose: Nose normal.      Mouth/Throat:      Mouth: Mucous membranes are moist.   Eyes:      Conjunctiva/sclera: Conjunctivae normal.   Cardiovascular:      Rate and Rhythm: Normal rate and regular rhythm.      Heart sounds: Normal heart sounds. No murmur heard.  Pulmonary:      Effort: Pulmonary effort is normal. No respiratory distress.      Breath sounds: Normal breath sounds.   Musculoskeletal:         General: Normal range of motion.   Skin:     General: Skin is warm and dry.      Findings: No rash.   Neurological:      General: No focal deficit present.      Mental Status: She is alert and oriented to person, place, and time.   Psychiatric:         Attention and Perception: Attention normal.         Mood and Affect: Mood is anxious.         Speech: Speech is tangential.         Behavior: Behavior normal.         Thought Content: Thought content does not include suicidal ideation.         ED Course                 Procedures              No results found for this or any previous visit (from the past 24 hour(s)).    Medications   LORazepam (ATIVAN) tablet 1 mg (1 mg Oral $Given 9/27/23 6908) "       Assessments & Plan (with Medical Decision Making)     I have low suspicion for cardiac cause for her symptoms.  History does seem consistent with anxiety and panic attack.  She does appear distressed and tearful.  I agreed to give her a dose of Ativan here.  Patient was informed that she will need to follow-up with her clinic provider for prescription medications for her anxiety.   Patient was discharged stable, she was able to contact her insurance provider to set up a ride to get her back home.    Discharge Medication List as of 9/27/2023  2:51 PM          Final diagnoses:   Anxiety       9/27/2023   [facility] EMERGENCY DEPT       Kira, NINFA Burroughs CNP  09/27/23 1530

## 2023-10-03 ENCOUNTER — TELEPHONE (OUTPATIENT)
Dept: FAMILY MEDICINE | Facility: OTHER | Age: 38
End: 2023-10-03

## 2023-10-03 NOTE — TELEPHONE ENCOUNTER
Request for possible appointment.  Patient seen in ED on 09/22/23 and 09/27/23 for anxiety. Patient given Ativan while in ED. Patient denies any new or worsening symptoms since discharged. Patient denies any thoughts of harming or killing self or others. Patient asking for ED follow up to discuss anxiety meds. Listed PCP to advise. Patient no show for multiple appointments.   Writer informed patient to be reseen in UC/ED with any new or worsening symptoms. Patient verbalized understanding.    Emergency Department and Urgent Care Follow-up    Reason for ER/UC visit: anxiety  Date seen: 09/22/23 and 09/27/23    New or Worsening symptoms:  no      Prescription Received/Picked up from Pharmacy?: no    Medications started? N/a  Any questions or issues regarding your prescription?: n/a    Follow-up Results or Labs that are pending: no diagnostic testing completed    Questions or concerns?: no    ER Recommends Follow-up by: as soon as possible     RN Recommendations:  be reseen in UC/ED with any new or worsening symptoms.  Appointment scheduled: request for possible appointment being sent to PCP listed on chart.    If you start feeling worse, or have any further questions, please feel free to contact Nurse Triage at [phone].  If needing immediate medical attention at any time please call 911/Go to the ER.

## 2023-10-04 ENCOUNTER — TELEPHONE (OUTPATIENT)
Dept: FAMILY MEDICINE | Facility: OTHER | Age: 38
End: 2023-10-04

## 2023-10-04 NOTE — TELEPHONE ENCOUNTER
2:49 PM    Reason for Call: Phone Call    Description: Patient called requesting a call from PCP or nurse regarding an appointment. Patient also wanted to let provider know she woke up with ear red, hot, and irritated    Was an appointment offered for this call? No  If yes : Appointment type              Date    Preferred method for responding to this message: Telephone Call  What is your phone number ? [phone]    If we cannot reach you directly, may we leave a detailed response at the number you provided? Yes    Can this message wait until your PCP/provider returns, if available today? YES, provider is in    Saulo Zepeda

## 2023-10-04 NOTE — TELEPHONE ENCOUNTER
"Spoke with patient and gave her message below as far as Samantha not providing her any controlled substances and discussed the no show policy with patient. Patient became verbally abusive towards writer. She began to swear at me and stated \"you can tell Samantha she will be seeing the best of me\". Writer asked her what she meant by this, writer then responded with \"you can take it however you want to take it you f+++ing bitch\". Patient then hung up on writer.  "

## 2023-10-24 ENCOUNTER — HOSPITAL ENCOUNTER (EMERGENCY)
Facility: CLINIC | Age: 38
Discharge: HOME | End: 2023-10-24
Attending: EMERGENCY MEDICINE | Admitting: EMERGENCY MEDICINE
Payer: MEDICAID

## 2023-10-24 VITALS
HEART RATE: 95 BPM | WEIGHT: 250.3 LBS | DIASTOLIC BLOOD PRESSURE: 74 MMHG | TEMPERATURE: 98.1 F | BODY MASS INDEX: 39.28 KG/M2 | RESPIRATION RATE: 19 BRPM | SYSTOLIC BLOOD PRESSURE: 123 MMHG | HEIGHT: 67 IN | OXYGEN SATURATION: 98 %

## 2023-10-24 DIAGNOSIS — F41.9 ANXIETY: ICD-10-CM

## 2023-10-24 LAB
ALBUMIN UR-MCNC: NEGATIVE MG/DL
APPEARANCE UR: CLEAR
BILIRUB UR QL STRIP: NEGATIVE
COLOR UR AUTO: ABNORMAL
GLUCOSE UR STRIP-MCNC: NEGATIVE MG/DL
HGB UR QL STRIP: NEGATIVE
KETONES UR STRIP-MCNC: NEGATIVE MG/DL
LEUKOCYTE ESTERASE UR QL STRIP: NEGATIVE
MUCOUS THREADS #/AREA URNS LPF: PRESENT /LPF
NITRATE UR QL: NEGATIVE
PH UR STRIP: 6 [PH] (ref 5–7)
RBC URINE: 0 /HPF
SP GR UR STRIP: 1.02 (ref 1–1.03)
SQUAMOUS EPITHELIAL: 2 /HPF
UROBILINOGEN UR STRIP-MCNC: NORMAL MG/DL
WBC URINE: 1 /HPF

## 2023-10-24 PROCEDURE — 99284 EMERGENCY DEPT VISIT MOD MDM: CPT | Performed by: EMERGENCY MEDICINE

## 2023-10-24 PROCEDURE — 81001 URINALYSIS AUTO W/SCOPE: CPT | Performed by: EMERGENCY MEDICINE

## 2023-10-24 PROCEDURE — 250N000013 HC RX MED GY IP 250 OP 250 PS 637: Performed by: EMERGENCY MEDICINE

## 2023-10-24 RX ORDER — ACETAMINOPHEN 500 MG
1000 TABLET ORAL ONCE
Status: COMPLETED | OUTPATIENT
Start: 2023-10-24 | End: 2023-10-24

## 2023-10-24 RX ORDER — IBUPROFEN 200 MG
400 TABLET ORAL ONCE
Status: COMPLETED | OUTPATIENT
Start: 2023-10-24 | End: 2023-10-24

## 2023-10-24 RX ORDER — HYDROXYZINE HYDROCHLORIDE 50 MG/1
50 TABLET, FILM COATED ORAL ONCE
Status: COMPLETED | OUTPATIENT
Start: 2023-10-24 | End: 2023-10-24

## 2023-10-24 RX ORDER — OLANZAPINE 10 MG/1
10 TABLET, ORALLY DISINTEGRATING ORAL ONCE
Status: COMPLETED | OUTPATIENT
Start: 2023-10-24 | End: 2023-10-24

## 2023-10-24 RX ADMIN — ACETAMINOPHEN 1000 MG: 500 TABLET ORAL at 09:26

## 2023-10-24 RX ADMIN — HYDROXYZINE HYDROCHLORIDE 50 MG: 50 TABLET, FILM COATED ORAL at 09:26

## 2023-10-24 RX ADMIN — IBUPROFEN 400 MG: 200 TABLET, FILM COATED ORAL at 09:25

## 2023-10-24 RX ADMIN — ACETAMINOPHEN 1000 MG: 500 TABLET ORAL at 01:34

## 2023-10-24 ASSESSMENT — ACTIVITIES OF DAILY LIVING (ADL)
ADLS_ACUITY_SCORE: 35
ADLS_ACUITY_SCORE: 33
ADLS_ACUITY_SCORE: 35
ADLS_ACUITY_SCORE: 35

## 2023-10-24 NOTE — ED TRIAGE NOTES
Triage Assessment (Adult)       Row Name 10/24/23 0042          Triage Assessment    Airway WDL WDL        Respiratory WDL    Respiratory WDL WDL        Skin Circulation/Temperature WDL    Skin Circulation/Temperature WDL WDL        Cardiac WDL    Cardiac WDL WDL        Peripheral/Neurovascular WDL    Peripheral Neurovascular WDL WDL        Cognitive/Neuro/Behavioral WDL    Cognitive/Neuro/Behavioral WDL WDL

## 2023-10-24 NOTE — DISCHARGE INSTRUCTIONS
Please make an appointment to [facility] with [facility] (phone: [phone]) as soon as possible    Aftercare Plan    Follow up with established providers and supports as scheduled. Continue taking medications as prescribed. Abstain from drugs and alcohol. Utilize your [facility] mental health crisis team as needed. They are available 24/7. Contact information is listed below.       If I am feeling unsafe or I am in a crisis, I will:   Contact my established care providers   Call the [facility] Suicide Prevention Lifeline: [phone]   Go to the nearest emergency room   Call 911     Warning signs that I or other people might notice when a crisis is developing for me: changes to sleep, appetite or mood, increased anger, agitation or irritability, feeling depressed or hopeless, spending more time alone or talking less, increased crying, decreased productivity, seeing or hearing things that aren't there, thoughts of not wanting to live anymore or of actually killing myself, thoughts of hurting others    Things I am able to do on my own to cope or help me feel better: watching a favorite tv show or movie, listening to music I enjoy, going outside and breathing fresh air, going for a walk or exercising, taking a shower or bath, a cold or hot beverage, a healthy snack, drawing/coloring/painting, journaling, singing or dancing, deep breathing     I can try practicing square breathing when I begin to feel anxious - inhale through the nose for the count of 4 and the first line on the square. Exhale through the mouth for the count of 4 for the second line of the square. Repeat to complete the square. Repeat the square as many times as needed.    I can also use my five senses to practice mindfulness and grounding. What are five things I can see, four things I can hear, three things I can feel, two things I can smell, and one thing I can taste.     Things that I am able to do with others to cope or help me feel better:  sometimes just talking or spending time with someone else, sharing a meal or having coffee, watching a movie or playing a game, going for a walk or exercising    I can also use community resources including mental health hotlines, [facility] crisis teams, or apps.     Things I can use or do for distraction: movies/tv, music, reading, games, drawing/coloring/painting or other art, essential oils, exercise, cleaning/organizing, puzzles, crossword puzzles, word search, Sudoku       I can also download a meditation or relaxation leelee, like Calm, Headspace, or Insight Timer (all three offer a free version)    Changes I can make to support my mental health and wellness: Attend scheduled mental health therapy and psychiatric appointments. Take my medications as prescribed. Maintain a daily schedule/routine. Abstain from all mood altering substances, including drugs, alcohol, or medications not currently prescribed to me. Implement a self-care routine.      People in my life that I can ask for help: friends or family, trusted teachers/staff/colleagues, trusted members of my community or place of Taoist, mental health crisis lines, or 911    Your [facility] has a mental health crisis team you can call 24/7: [facility] Mental Health Crisis Help ; Crisis Response [phone]   [facility]   [phone] ; [facility]   [phone]    Other things that are important when I m in crisis: to remember that the feelings I am having right now are temporary, and it won't feel like this forever, and that it is okay and important to ask for help    Crisis Lines  Crisis Text Line  Text 499404  You will be connected with a trained live crisis counselor to provide support.    Por espanol, texto  XIOMARA a 605000 o texto a 442-AYUDAME en WhatsApp    National Hope Line  1.800.SUICIDE [4942822]      Community Resources  Fast Tracker  Linking people to mental health and substance use disorder resources  Fabriclyn.org  "    [facility] Warm Line  Peer to peer support  Monday thru Saturday, 12 pm to 10 pm  [phone] or 1.659.710.9004  Text \"Support\" to 42848    National [place] on Mental Illness (WAYNE)  102.591.7796 or 1.888.WAYNE.HELPS      Mental Health Apps  My3  https://Scotty Gear.Hordspot/    VirtualHopeBox  https://"[employer]"/apps/virtual-hope-box/      Additional Information  Today you were seen by a licensed mental health professional through Triage and Transition services, Behavioral Healthcare Providers ([facility])  for a crisis assessment in the Emergency Department at [facility].  It is recommended that you follow up with your established providers (psychiatrist, mental health therapist, and/or primary care doctor - as relevant) as soon as possible. Coordinators from [facility] will be calling you in the next 24-48 hours to ensure that you have the resources you need.  You can also contact [facility] coordinators directly at [phone]. You may have been scheduled for or offered an appointment with a mental health provider. [facility] maintains an extensive network of licensed behavioral health providers to connect patients with the services they need.  We do not charge providers a fee to participate in our referral network.  We match patients with providers based on a patient's specific needs, insurance coverage, and location.  Our first effort will be to refer you to a provider within your care system, and will utilize providers outside your care system as needed.       "

## 2023-10-24 NOTE — ED PROVIDER NOTES
ED Provider Note  [facility]      History     Chief Complaint   Patient presents with    Anxiety     Pt stated having some high anxiety which triggered with life events happened.      HPI  Julia Fierro is a 37 year old female with a past medical history of PTSD, anxiety, MDD, and paroxysmal supraventricular tachycardia who presents to the emergency department seeking evaluation of worsening anxiety. She states that today is the anniversary of a traumatic event in her life, of which is exacerbating her anxiety. She denies any drug or alcohol use tonight. She states that she needs to stay away from her friends who are drinking for the [employer] game as she does not want to be around substances. She denies any hallucinations. No suicidal ideation. She states that she has been taking hydroxyzine to no avail. She has a therapist, and is attempted to establish medication management with a psychiatrist.        Past Medical History  Past Medical History:   Diagnosis Date    Anxiety     Anxiety 7/1/2014    Major depression, recurrent (H24) 3/25/2015    Moderate episode of recurrent major depressive disorder (H) 3/25/2015    Panic attacks 7/15/2015    Paroxysmal supraventricular tachycardia 7/24/2017    PTSD (post-traumatic stress disorder) 7/9/2021    Tobacco abuse 7/24/2017     Past Surgical History:   Procedure Laterality Date    EXTRACTION(S) DENTAL      wisdom teeth extracted    LAPAROSCOPY  2005    Endometriosis     albuterol (PROAIR HFA/PROVENTIL HFA/VENTOLIN HFA) 108 (90 Base) MCG/ACT inhaler  buPROPion (WELLBUTRIN XL) 150 MG 24 hr tablet      Allergies   Allergen Reactions    Sertraline     Sertraline Hcl      Lightheaded  Shakes        Shakes      Penicillins Rash     Family History  Family History   Problem Relation Age of Onset    Arthritis Mother     Coronary Artery Disease Mother     Hyperlipidemia Father     Hypertension Father     Other Cancer Father     Diabetes Paternal  "Grandmother      Social History   Social History     Tobacco Use    Smoking status: Former     Packs/day: .25     Types: Cigarettes     Quit date: 2021     Years since quittin.8    Smokeless tobacco: Never    Tobacco comments:     tried to quit yes, yr quit , no passive exposure   Substance Use Topics    Alcohol use: Yes     Alcohol/week: 0.0 standard drinks of alcohol     Comment: occasionally    Drug use: No      Past medical history, past surgical history, medications, allergies, family history, and social history were reviewed with the patient. No additional pertinent items.      A medically appropriate review of systems was performed with pertinent positives and negatives noted in the HPI, and all other systems negative.    Physical Exam   BP: 123/74  Pulse: 95  Temp: 98.1  F (36.7  C)  Resp: 19  Height: 170.2 cm (5' 7\")  Weight: 113.5 kg (250 lb 4.8 oz)  SpO2: 98 %  Physical Exam  Physical Exam   Constitutional: oriented to person, place, and time. appears well-developed and well-nourished.   HENT:   Head: Normocephalic and atraumatic.   Neck: Normal range of motion.   Pulmonary/Chest: Effort normal. No respiratory distress.   Cardiac: No murmurs, rubs, gallops. RRR.  Abdominal: Abdomen soft, nontender, nondistended. No rebound tenderness.  MSK: Long bones without deformity or evidence of trauma  Neurological: alert and oriented to person, place, and time.   Skin: Skin is warm and dry.   Psychiatric:  normal mood and affect.  behavior is normal. Thought content normal.       ED Course, Procedures, & Data      Procedures            No results found for any visits on 10/24/23.  Medications - No data to display  Labs Ordered and Resulted from Time of ED Arrival to Time of ED Departure - No data to display  No orders to display          Critical care was not performed.     Medical Decision Making  The patient's presentation was of moderate complexity (a chronic illness mild to moderate exacerbation, " progression, or side effect of treatment).    The patient's evaluation involved:  discussion of management or test interpretation with another health professional (mental health )    The patient's management necessitated further care after sign-out to Dr. Delarosa (see their note for further management).    Assessment & Plan    MDM  Patient here with anxiety and does not feel safe going back to the treatment center that she was residing in.  She is trying to get back home.  She would like a mental assessment.  She does not likely need acute hospitalization but would benefit from resources.  Patient will need this assessment prior to disposition however she can leave anytime she wishes, does not meet criteria to be placed on hold.    I have reviewed the nursing notes. I have reviewed the findings, diagnosis, plan and need for follow up with the patient.    New Prescriptions    No medications on file       Final diagnoses:   Anxiety   I, Manan Butcher, am serving as a trained medical scribe to document services personally performed by Duong Paige MD, based on the provider's statements to me.     I, Duong Paige MD, was physically present and have reviewed and verified the accuracy of this note documented by Manan Butcher.      Duong Paige MD  [facility] EMERGENCY DEPARTMENT  10/24/2023     Duong Paige MD  10/24/23 0653

## 2023-10-24 NOTE — CONSULTS
"Diagnostic Evaluation Consultation  Crisis Assessment    Patient Name: Julia Fierro  Age:  37 year old  Legal Sex: female  Gender Identity: female  Pronouns:   Race: White  Ethnicity: Not  or   Language: English      Patient was assessed: In person      Patient location: [facility] EMERGENCY DEPARTMENT                             ED19    Referral Data and Chief Complaint  Julia Fierro presents to the ED by  self. Patient is presenting to the ED for the following concerns: Anxiety.   Factors that make the mental health crisis life threatening or complex are:  Patient presented to the ED on her own with report of worsening anxiety.  Per record, \"She states that today is the anniversary of a traumatic event in her life, of which is exacerbating her anxiety. She denies any drug or alcohol use tonight. She states that she needs to stay away from her friends who are drinking for the [employer] game as she does not want to be around substances.\"  Patient is irritable during interview.  She states she came to the ED due to anxiety and not feeling like herself.  Patient denies suicidal ideation, or hx of attempts.  She denies SIB,  HI, or halluciantions.  Patient is resistive to questions regarding substance use. \"Its not of your business.\"  Patient states she doesn't know  and is not going to talk about it.  Patient has multiple compaints regarding care.  Patient reports having two counselors and a therapist.  When clincian attempted to clarify provider information, patient responded, \"it is none of your information.\"  Patient states she does not need a mental health evaluation and has seen someone outpatient.  Patient states she has worked with her therapist for 3 years.  Patient states she is not going to talk with clinician any further..      Informed Consent and Assessment Methods  Explained the crisis assessment process, including applicable information disclosures and limits to " confidentiality, assessed understanding of the process, and obtained consent to proceed with the assessment.  Assessment methods included conducting a formal interview with patient, review of medical records, collaboration with medical staff, and obtaining relevant collateral information from family and community providers when available.  : done     Patient response to interventions: needs reinforcement  Coping skills were attempted to reduce the crisis:  patient came to the ED on her own.     History of the Crisis   Patient has a history of MDD, Anxiety, and PTSD.  Patient states she left [employer] on Wednesday. Record indicates previous ED visits and hx of long term time.    Brief Psychosocial History  Family:  Single, Children  (refuses to answer.)  Support System:   (refuses to answer.)  Employment Status:   (refuses to answer.)  Source of Income:  unable to assess  Financial Environmental Concerns:   (unable to assess)  Current Hobbies:   (unable to assess)  Barriers in Personal Life:   (unable to assess)    Significant Clinical History  Current Anxiety Symptoms:  anxious  Current Depression/Trauma:  irritable, negativistic  Current Somatic Symptoms:  anxious, somatic symptoms (abdominal pain, headache, tension)  Current Psychosis/Thought Disturbance:  agitation  Current Eating Symptoms:   (n/a)  Chemical Use History:  Alcohol:  (refuses to answer.)  Last Use::  (refuses to answer.)  Benzodiazepines:  (refuses to answer.)  Last Use::  (refuses to answer.)  Opiates:  (refuses to answer.)  Last Use::  (refuses to answer.)  Cocaine:  (refuses to answer.)  Last Use::  (refuses to answer.)  Marijuana:  (refuses to answer.)  Last Use::  (refuses to answer.)  Other Use:  (refuses to answer.)  Last Use::  (refuses to answer.)  Withdrawal Symptoms:  (refuses to answer.)  Addictions:  (refuses to answer.)   Past diagnosis:  Anxiety Disorder, PTSD, Depression  Family history:  No known history of mental health or  chemical health concerns  Past treatment:  Individual therapy, Psychiatric Medication Management  Details of most recent treatment:  Patient states she left [facility] on Wednesday.  She refused to provider further informantion  Other relevant history:  No other known hx       Collateral Information  Is there collateral information: No (refused)       Risk Assessment  [place] Suicide Severity Rating Scale Full Clinical Version:  Suicidal Ideation  Q1 Wish to be Dead (Lifetime): No  Q2 Non-Specific Active Suicidal Thoughts (Lifetime): No     Suicidal Behavior (Lifetime)  Actual Attempt (Lifetime): No  Has subject engaged in non-suicidal self-injurious behavior? (Lifetime): No  Interrupted Attempts (Lifetime): No  Aborted or Self-Interrupted Attempt (Lifetime): No  Preparatory Acts or Behavior (Lifetime): No    [place] Suicide Severity Rating Scale Recent:   Suicidal Ideation (Recent)  Q1 Wished to be Dead (Past Month): no  Q2 Suicidal Thoughts (Past Month): no     Suicidal Behavior (Recent)  Actual Attempt (Past 3 Months): No  Has subject engaged in non-suicidal self-injurious behavior? (Past 3 Months): No  Interrupted Attempts (Past 3 Months): No  Aborted or Self-Interrupted Attempt (Past 3 Months): No  Preparatory Acts or Behavior (Past 3 Months): No    Environmental or Psychosocial Events:  (unable to assess)  Protective Factors: Protective Factors: help seeking    Does the patient have thoughts of harming others? Feels Like Hurting Others: no  Previous Attempt to Hurt Others: no  Current presentation: Irritable  Violence Threats in Past 6 Months: no known hx  Current Violence Plan or Thoughts: denies  Is the patient engaging in sexually inappropriate behavior?:  (no known hx)  Duty to warn initiated: no  Duty to warn details: n/a    Is the patient engaging in sexually inappropriate behavior?   (no known hx)        Mental Status Exam   Affect: Dramatic  Appearance: Appropriate  Attention Span/Concentration:  Inattentive  Eye Contact: Variable    Fund of Knowledge: Appropriate   Language /Speech Content: Fluent  Language /Speech Volume: Normal  Language /Speech Rate/Productions: Normal  Recent Memory: Variable  Remote Memory: Variable  Mood: Irritable  Orientation to Person: Yes   Orientation to Place: Yes  Orientation to Time of Day: Yes  Orientation to Date: Yes     Situation (Do they understand why they are here?): Yes  Psychomotor Behavior: Normal  Thought Content: Clear  Thought Form: Intact     Mini-Cog Assessment  Number of Words Recalled:    Clock-Drawing Test:     Three Item Recall:    Mini-Cog Total Score:       Medication  Psychotropic medications:   Medication Orders - Psychiatric (From admission, onward)      None             Current Care Team  Patient Care Team:  Samantha Quintana CNP as PCP - General (Family Medicine)  Samantha Quintana CNP as Assigned PCP    Diagnosis  Patient Active Problem List   Diagnosis Code    Anxiety F41.9    Moderate episode of recurrent major depressive disorder (H) F33.1    Paroxysmal supraventricular tachycardia I47.10    Myopia of both eyes with astigmatism H52.13, H52.203    PTSD (post-traumatic stress disorder) F43.10    Morbid obesity (H) E66.01       Primary Problem This Admission  Active Hospital Problems    PTSD (post-traumatic stress disorder)      Moderate episode of recurrent major depressive disorder (H)      *Anxiety        Clinical Summary and Substantiation of Recommendations   Patient denies thoughts of wanting to harm herself or others.  She will not engage in interview regarding chemical health and states she left [facility] on Wednesday.  Patient states she wants to go home.  She reports having outpatient supports, counselors, and therapist.  Patient to discharge and follow up with established providers.      Patient coping skills attempted to reduce the crisis:  patient came to the ED on her own.    Disposition  Recommended disposition: Substance Abuse Disorder  Treatment, Individual Therapy, Medication Management        Reviewed case and recommendations with attending provider. Attending Name: Dr Delarosa       Attending concurs with disposition: yes       Patient and/or validated legal guardian concurs with disposition:   yes       Final disposition:  discharge    Legal status on admission:      Assessment Details   Total duration spent with the patient: 15 min     CPT code(s) utilized: Non-Billable    KAREN Asencio, Psychotherapist  [facility] - Triage & Transition Services  Callback: [phone]

## 2024-01-19 ENCOUNTER — TELEPHONE (OUTPATIENT)
Dept: CARE COORDINATION | Facility: OTHER | Age: 39
End: 2024-01-19

## 2024-01-19 NOTE — TELEPHONE ENCOUNTER
Patient called to clinic stating that she has some medication prescribed to her from Urgent Care for anxiety that Samantha Quintana NP needs to authorize since she is on restricted insurance.    RN CC Asked the patient which medication she was looking to fill she did not know the name of the medication.    Phone call was disconnected.  RN CC attempted to call patient back at number listed in chart with no answer.    Per chart review- care everywhere:  Patient seen at [facility]-  Medications  Medication Sig Dispensed Refills Start Date End Date Status   LORazepam (ATIVAN OR)  Take by mouth two times a day.   0     Active   buPROPion XL (Wellbutrin XL) 150 MG 24 hour extended release tablet  Take 150 mg by mouth at bedtime.   0 04/22/2022   Active

## 2024-01-22 ENCOUNTER — TELEPHONE (OUTPATIENT)
Dept: FAMILY MEDICINE | Facility: OTHER | Age: 39
End: 2024-01-22

## 2024-01-22 DIAGNOSIS — F33.1 MODERATE EPISODE OF RECURRENT MAJOR DEPRESSIVE DISORDER (H): ICD-10-CM

## 2024-01-22 DIAGNOSIS — F41.9 ANXIETY: Primary | ICD-10-CM

## 2024-01-22 DIAGNOSIS — F43.10 PTSD (POST-TRAUMATIC STRESS DISORDER): ICD-10-CM

## 2024-01-22 NOTE — TELEPHONE ENCOUNTER
Celena from [facility] calling for a referral for [facility] for mental health and psychiatry for med management.      She is on a restricted plan and needs to go through Celena.    Please fax to [phone]    Thank you,  Cinthia

## 2024-01-23 NOTE — TELEPHONE ENCOUNTER
Telephone call placed to patient with plan to update that she will need to be seen in clinic for any medication needs as she has not been seen by PCP in over one year. Also, PCP sent referral to [facility] per request of patients insurance for patient to establish for mental health care.    Patient did not answer.  Message was left that a return call would be made at a later time.

## 2024-01-25 NOTE — TELEPHONE ENCOUNTER
Telephone call placed to patient.  No answer. Message left to return call to clinic if she has any further needs.

## 2024-02-02 ENCOUNTER — TELEPHONE (OUTPATIENT)
Dept: FAMILY MEDICINE | Facility: OTHER | Age: 39
End: 2024-02-02

## 2024-02-02 NOTE — TELEPHONE ENCOUNTER
Ciara with [facility] called to give PCP an update on referral that was sent to them. Patient has been scheduled with Alfreda Crowley on 02-.

## 2024-02-26 ENCOUNTER — TELEPHONE (OUTPATIENT)
Dept: FAMILY MEDICINE | Facility: OTHER | Age: 39
End: 2024-02-26

## 2024-02-26 NOTE — TELEPHONE ENCOUNTER
Reason for call:  Medication    [facility] called on this, pt is scheduled to see psychiatry in on 3-07-24 with [facility] and needs enough till patient gets into to see them. Pt is out  Have you contacted your pharmacy? Yes   If patient has contacted Pharmacy and it has been over 72hrs, continue to #2  Medication buPROPion (WELLBUTRIN XL) 150 MG 24 hr tablet   What Pharmacy do you use? [facility]      (Please note that the turn-around-time for prescriptions is 72 business hours; I am sending your request at this time. SEND TO  [facility]  )

## 2024-02-26 NOTE — TELEPHONE ENCOUNTER
LOV with me 2022  Has No showed all appts scheduled  I will not fill this medication    Samantha JOSHI-[facility]  [phone]

## 2024-03-02 ENCOUNTER — HOSPITAL ENCOUNTER (EMERGENCY)
Facility: HOSPITAL | Age: 39
Discharge: HOME | End: 2024-03-02
Attending: NURSE PRACTITIONER | Admitting: NURSE PRACTITIONER
Payer: COMMERCIAL

## 2024-03-02 VITALS
OXYGEN SATURATION: 98 % | WEIGHT: 258.8 LBS | BODY MASS INDEX: 41.59 KG/M2 | HEIGHT: 66 IN | RESPIRATION RATE: 16 BRPM | HEART RATE: 65 BPM | TEMPERATURE: 97.4 F | SYSTOLIC BLOOD PRESSURE: 125 MMHG | DIASTOLIC BLOOD PRESSURE: 81 MMHG

## 2024-03-02 DIAGNOSIS — J02.9 ACUTE PHARYNGITIS, UNSPECIFIED ETIOLOGY: Primary | ICD-10-CM

## 2024-03-02 LAB — GROUP A STREP BY PCR: NOT DETECTED

## 2024-03-02 PROCEDURE — 87651 STREP A DNA AMP PROBE: CPT | Performed by: NURSE PRACTITIONER

## 2024-03-02 PROCEDURE — 99213 OFFICE O/P EST LOW 20 MIN: CPT | Performed by: NURSE PRACTITIONER

## 2024-03-02 PROCEDURE — G0463 HOSPITAL OUTPT CLINIC VISIT: HCPCS

## 2024-03-02 ASSESSMENT — ENCOUNTER SYMPTOMS
VOICE CHANGE: 0
COUGH: 0
APPETITE CHANGE: 0
SORE THROAT: 1
SHORTNESS OF BREATH: 0
TROUBLE SWALLOWING: 0

## 2024-03-02 ASSESSMENT — ACTIVITIES OF DAILY LIVING (ADL): ADLS_ACUITY_SCORE: 35

## 2024-03-02 NOTE — ED PROVIDER NOTES
History     Chief Complaint   Patient presents with    Pharyngitis     Sore throat     HPI  Julia Fierro is a 38 year old female who presents to urgent care for evaluation of a sore throat that started yesterday.  This is accompanied by congestion and some puffiness under her eyes.  No fevers or chills.  No cough, shortness of breath, chest pain, abdominal pain, N/V/D.  She took Tylenol earlier today.  No recent ill contacts.    Allergies:  Allergies   Allergen Reactions    Sertraline     Sertraline Hcl      Lightheaded  Shakes        Shakes      Penicillins Rash       Problem List:    Patient Active Problem List    Diagnosis Date Noted    Morbid obesity (H) 03/07/2022     Priority: Medium    PTSD (post-traumatic stress disorder) 07/09/2021     Priority: Medium    Myopia of both eyes with astigmatism 06/08/2021     Priority: Medium    Paroxysmal supraventricular tachycardia 07/24/2017     Priority: Medium    Moderate episode of recurrent major depressive disorder (H) 03/25/2015     Priority: Medium    Anxiety 07/01/2014     Priority: Medium        Past Medical History:    Past Medical History:   Diagnosis Date    Anxiety     Anxiety 7/1/2014    Major depression, recurrent (H24) 3/25/2015    Moderate episode of recurrent major depressive disorder (H) 3/25/2015    Panic attacks 7/15/2015    Paroxysmal supraventricular tachycardia 7/24/2017    PTSD (post-traumatic stress disorder) 7/9/2021    Tobacco abuse 7/24/2017       Past Surgical History:    Past Surgical History:   Procedure Laterality Date    EXTRACTION(S) DENTAL      wisdom teeth extracted    LAPAROSCOPY  2005    Endometriosis       Family History:    Family History   Problem Relation Age of Onset    Arthritis Mother     Coronary Artery Disease Mother     Hyperlipidemia Father     Hypertension Father     Other Cancer Father     Diabetes Paternal Grandmother        Social History:  Marital Status:  Single [1]  Social History     Tobacco Use    Smoking  "status: Former     Packs/day: .25     Types: Cigarettes     Quit date: 2021     Years since quittin.1    Smokeless tobacco: Never    Tobacco comments:     tried to quit yes, yr quit , no passive exposure   Substance Use Topics    Alcohol use: Yes     Alcohol/week: 0.0 standard drinks of alcohol     Comment: occasionally    Drug use: No        Medications:    albuterol (PROAIR HFA/PROVENTIL HFA/VENTOLIN HFA) 108 (90 Base) MCG/ACT inhaler  buPROPion (WELLBUTRIN XL) 150 MG 24 hr tablet          Review of Systems   Constitutional:  Negative for appetite change.   HENT:  Positive for congestion and sore throat. Negative for trouble swallowing and voice change.    Respiratory:  Negative for cough and shortness of breath.    All other systems reviewed and are negative.      Physical Exam   BP: 125/81  Pulse: 65  Temp: 97.4  F (36.3  C)  Resp: 16  Height: 167.6 cm (5' 6\")  Weight: 117.4 kg (258 lb 12.8 oz)  SpO2: 98 %      Physical Exam  Vitals and nursing note reviewed.   Constitutional:       General: She is not in acute distress.     Appearance: Normal appearance. She is well-developed. She is not diaphoretic.   HENT:      Head: Normocephalic and atraumatic.      Right Ear: Tympanic membrane and ear canal normal. Tympanic membrane is not erythematous.      Left Ear: Tympanic membrane and ear canal normal. Tympanic membrane is not erythematous.      Mouth/Throat:      Mouth: Mucous membranes are moist.      Pharynx: Uvula midline. No pharyngeal swelling, oropharyngeal exudate, posterior oropharyngeal erythema or uvula swelling.      Tonsils: No tonsillar exudate or tonsillar abscesses. 1+ on the right. 1+ on the left.      Comments: Uvula is midline.  No erythema to posterior pharynx.  Mild bilateral tonsillar hypertrophy.  Eyes:      General: No scleral icterus.     Conjunctiva/sclera: Conjunctivae normal.      Pupils: Pupils are equal, round, and reactive to light.   Cardiovascular:      Rate and Rhythm: " Normal rate and regular rhythm.      Heart sounds: Normal heart sounds.   Pulmonary:      Effort: Pulmonary effort is normal. No respiratory distress.      Breath sounds: Normal breath sounds. No wheezing or rhonchi.   Abdominal:      General: Abdomen is flat.   Musculoskeletal:      Cervical back: Normal range of motion and neck supple.   Lymphadenopathy:      Cervical: No cervical adenopathy.   Skin:     General: Skin is warm and dry.      Coloration: Skin is not pale.   Neurological:      Mental Status: She is alert and oriented to person, place, and time.         ED Course        Procedures         Results for orders placed or performed during the hospital encounter of 03/02/24 (from the past 24 hour(s))   Group A Streptococcus PCR Throat Swab    Specimen: Throat; Swab   Result Value Ref Range    Group A strep by PCR Not Detected Not Detected    Narrative    The Xpert Xpress Strep A test, performed on the [employer], is a rapid, qualitative in vitro diagnostic test for the detection of Streptococcus pyogenes (Group A ß-hemolytic Streptococcus, Strep A) in throat swab specimens from patients with signs and symptoms of pharyngitis. The Xpert Xpress Strep A test can be used as an aid in the diagnosis of Group A Streptococcal pharyngitis. The assay is not intended to monitor treatment for Group A Streptococcus infections. The Xpert Xpress Strep A test utilizes an automated real-time polymerase chain reaction (PCR) to detect Streptococcus pyogenes DNA.       Medications - No data to display    Assessments & Plan (with Medical Decision Making)   38-year-old female that presented for evaluation of a sore throat that started yesterday.  Declines respiratory viral testing.  She has mild bilateral tonsillar hypertrophy.  Uvula is midline.  No significant erythema to posterior pharynx.  She tested negative for strep throat.    Discussed findings with patient.  Likely viral etiology.  Recommended Tylenol  or ibuprofen as needed for pain.  Push fluids.  Do warm salt water gargles or use throat lozenges.    Follow-up with primary doctor if no improvement in symptoms.  Return to urgent care or emergency room for any worsening or concerning symptoms.    I have reviewed the nursing notes.    I have reviewed the findings, diagnosis, plan and need for follow up with the patient.  This document was prepared using a combination of typing and voice generated software.  While every attempt was made for accuracy, spelling and grammatical errors may exist.         Discharge Medication List as of 3/2/2024 12:54 PM          Final diagnoses:   Acute pharyngitis, unspecified etiology       3/2/2024   [facility]       Levon, Zarina, CNP  03/02/24 [id number]

## 2024-03-02 NOTE — DISCHARGE INSTRUCTIONS
Strep test came back negative.   Take Tylenol or ibuprofen as needed for pain.  Do warm salt water gargles.  Drink plenty of fluids.    Follow-up with your doctor if no improvement in symptoms.      Return to urgent care or emergency room for any worsening or concerning symptoms.

## 2024-03-02 NOTE — ED TRIAGE NOTES
Pt presents with c/o sore throat. Also states puffiness under eyes. Sx started last night. Denies known exposures. Reports pt is still eating and drinking. Denies toileting issues. Pt took ibuprofen last night. Pt refuses multiplex covid testing at this time

## 2024-03-09 ENCOUNTER — HOSPITAL ENCOUNTER (EMERGENCY)
Facility: HOSPITAL | Age: 39
Discharge: HOME | End: 2024-03-09
Attending: INTERNAL MEDICINE | Admitting: INTERNAL MEDICINE
Payer: COMMERCIAL

## 2024-03-09 VITALS
SYSTOLIC BLOOD PRESSURE: 167 MMHG | RESPIRATION RATE: 16 BRPM | DIASTOLIC BLOOD PRESSURE: 97 MMHG | HEART RATE: 86 BPM | OXYGEN SATURATION: 98 % | TEMPERATURE: 97.6 F

## 2024-03-09 DIAGNOSIS — S81.811A LACERATION OF RIGHT LOWER LEG, INITIAL ENCOUNTER: ICD-10-CM

## 2024-03-09 PROCEDURE — 99282 EMERGENCY DEPT VISIT SF MDM: CPT

## 2024-03-09 PROCEDURE — 99282 EMERGENCY DEPT VISIT SF MDM: CPT | Performed by: INTERNAL MEDICINE

## 2024-03-09 ASSESSMENT — COLUMBIA-SUICIDE SEVERITY RATING SCALE - C-SSRS
1. IN THE PAST MONTH, HAVE YOU WISHED YOU WERE DEAD OR WISHED YOU COULD GO TO SLEEP AND NOT WAKE UP?: NO
6. HAVE YOU EVER DONE ANYTHING, STARTED TO DO ANYTHING, OR PREPARED TO DO ANYTHING TO END YOUR LIFE?: NO
2. HAVE YOU ACTUALLY HAD ANY THOUGHTS OF KILLING YOURSELF IN THE PAST MONTH?: NO

## 2024-03-10 ASSESSMENT — ENCOUNTER SYMPTOMS
SHORTNESS OF BREATH: 0
ABDOMINAL PAIN: 0
FEVER: 0
COUGH: 0

## 2024-03-10 NOTE — ED PROVIDER NOTES
History     Chief Complaint   Patient presents with    Laceration     The history is provided by the patient.   Laceration  Location:  Leg  Leg laceration location:  R lower leg  Depth:  Cutaneous  Quality: straight    Bleeding: controlled    Time since incident:  30 minutes  Laceration mechanism:  Broken glass  Pain details:     Severity:  No pain  Foreign body present:  No foreign bodies  Tetanus status:  Up to date  Associated symptoms: no fever and no rash        Allergies:  Allergies   Allergen Reactions    Sertraline     Sertraline Hcl      Lightheaded  Shakes        Shakes      Penicillins Rash       Problem List:    Patient Active Problem List    Diagnosis Date Noted    Morbid obesity (H) 03/07/2022     Priority: Medium    PTSD (post-traumatic stress disorder) 07/09/2021     Priority: Medium    Myopia of both eyes with astigmatism 06/08/2021     Priority: Medium    Paroxysmal supraventricular tachycardia 07/24/2017     Priority: Medium    Moderate episode of recurrent major depressive disorder (H) 03/25/2015     Priority: Medium    Anxiety 07/01/2014     Priority: Medium        Past Medical History:    Past Medical History:   Diagnosis Date    Anxiety     Anxiety 7/1/2014    Major depression, recurrent (H24) 3/25/2015    Moderate episode of recurrent major depressive disorder (H) 3/25/2015    Panic attacks 7/15/2015    Paroxysmal supraventricular tachycardia 7/24/2017    PTSD (post-traumatic stress disorder) 7/9/2021    Tobacco abuse 7/24/2017       Past Surgical History:    Past Surgical History:   Procedure Laterality Date    EXTRACTION(S) DENTAL      wisdom teeth extracted    LAPAROSCOPY  2005    Endometriosis       Family History:    Family History   Problem Relation Age of Onset    Arthritis Mother     Coronary Artery Disease Mother     Hyperlipidemia Father     Hypertension Father     Other Cancer Father     Diabetes Paternal Grandmother        Social History:  Marital Status:  Single [1]  Social  History     Tobacco Use    Smoking status: Former     Packs/day: .25     Types: Cigarettes, Vaping Device     Quit date: 2021     Years since quittin.2    Smokeless tobacco: Never    Tobacco comments:     tried to quit yes, yr quit , no passive exposure   Substance Use Topics    Alcohol use: Not Currently     Comment: occasionally    Drug use: No        Medications:    albuterol (PROAIR HFA/PROVENTIL HFA/VENTOLIN HFA) 108 (90 Base) MCG/ACT inhaler  buPROPion (WELLBUTRIN XL) 150 MG 24 hr tablet          Review of Systems   Constitutional:  Negative for fever.   Respiratory:  Negative for cough and shortness of breath.    Cardiovascular:  Negative for chest pain.   Gastrointestinal:  Negative for abdominal pain.   Skin:  Negative for rash.   All other systems reviewed and are negative.      Physical Exam   BP: 167/97  Pulse: 86  Temp: 97.6  F (36.4  C)  Resp: 16  SpO2: 98 %      Physical Exam  Constitutional:       General: She is not in acute distress.     Appearance: She is not diaphoretic.   HENT:      Head: Atraumatic.   Eyes:      Pupils: Pupils are equal, round, and reactive to light.   Cardiovascular:      Rate and Rhythm: Regular rhythm.      Heart sounds: Normal heart sounds.   Pulmonary:      Effort: No respiratory distress.      Breath sounds: Normal breath sounds.   Chest:      Chest wall: No tenderness.   Abdominal:      General: Bowel sounds are normal.      Palpations: Abdomen is soft.      Tenderness: There is no abdominal tenderness.   Musculoskeletal:         General: No tenderness. Normal range of motion.      Cervical back: No tenderness.      Thoracic back: No tenderness.      Lumbar back: No tenderness.        Legs:       Comments: 6 cm , superficial tear   Skin:     Findings: No abrasion or laceration.   Neurological:      Mental Status: She is alert and oriented to person, place, and time.         ED Course        Procedures                No results found for this or any previous  visit (from the past 24 hour(s)).    Medications - No data to display    Assessments & Plan (with Medical Decision Making)   Right lower leg superficial laceration   No need for suturing   D C home.    I have reviewed the nursing notes.    I have reviewed the findings, diagnosis, plan and need for follow up with the patient.          Discharge Medication List as of 3/9/2024 11:26 PM          Final diagnoses:   Laceration of right lower leg, initial encounter       3/9/2024   [facility]       Keon Camargo MD  03/10/24 0336

## 2024-03-10 NOTE — ED TRIAGE NOTES
Patient presents with laceration to right posterior calf after cutting it on glass from trash. Active bleeding controlled at this time.

## 2024-03-10 NOTE — ED NOTES
"Patient presents w/ a laceration on her right lateral calf, bleeding is controlled.   Laceration is superficial.   Cleaned with NS.  She reports she was taking out the trash and the bag brushed up against her leg, \"there must have been some glass or can in there\".  UTD on tetanus.      "

## 2024-03-12 ENCOUNTER — HOSPITAL ENCOUNTER (EMERGENCY)
Facility: HOSPITAL | Age: 39
Discharge: HOME | End: 2024-03-12
Attending: NURSE PRACTITIONER | Admitting: NURSE PRACTITIONER
Payer: COMMERCIAL

## 2024-03-12 VITALS
SYSTOLIC BLOOD PRESSURE: 127 MMHG | BODY MASS INDEX: 40.62 KG/M2 | HEART RATE: 64 BPM | HEIGHT: 67 IN | WEIGHT: 258.82 LBS | TEMPERATURE: 97.4 F | OXYGEN SATURATION: 97 % | RESPIRATION RATE: 16 BRPM | DIASTOLIC BLOOD PRESSURE: 84 MMHG

## 2024-03-12 DIAGNOSIS — N39.0 URINARY TRACT INFECTION: Primary | ICD-10-CM

## 2024-03-12 DIAGNOSIS — Z11.3 SCREEN FOR STD (SEXUALLY TRANSMITTED DISEASE): ICD-10-CM

## 2024-03-12 DIAGNOSIS — N76.0 BV (BACTERIAL VAGINOSIS): Primary | ICD-10-CM

## 2024-03-12 DIAGNOSIS — B96.89 BV (BACTERIAL VAGINOSIS): Primary | ICD-10-CM

## 2024-03-12 DIAGNOSIS — A59.9 TRICHOMONAS VAGINALIS INFECTION: ICD-10-CM

## 2024-03-12 LAB
ALBUMIN UR-MCNC: NEGATIVE MG/DL
APPEARANCE UR: CLEAR
BACTERIAL VAGINOSIS VAG-IMP: POSITIVE
BILIRUB UR QL STRIP: NEGATIVE
C TRACH DNA SPEC QL PROBE+SIG AMP: NEGATIVE
CANDIDA DNA VAG QL NAA+PROBE: NOT DETECTED
CANDIDA GLABRATA / CANDIDA KRUSEI DNA: NOT DETECTED
COLOR UR AUTO: ABNORMAL
GLUCOSE UR STRIP-MCNC: NEGATIVE MG/DL
HCG UR QL: NEGATIVE
HGB UR QL STRIP: NEGATIVE
KETONES UR STRIP-MCNC: NEGATIVE MG/DL
LEUKOCYTE ESTERASE UR QL STRIP: ABNORMAL
MUCOUS THREADS #/AREA URNS LPF: PRESENT /LPF
N GONORRHOEA DNA SPEC QL NAA+PROBE: NEGATIVE
NITRATE UR QL: NEGATIVE
PH UR STRIP: 5 [PH] (ref 4.7–8)
RBC URINE: 1 /HPF
SP GR UR STRIP: 1.02 (ref 1–1.03)
SQUAMOUS EPITHELIAL: 2 /HPF
T VAGINALIS DNA VAG QL NAA+PROBE: DETECTED
UROBILINOGEN UR STRIP-MCNC: NORMAL MG/DL
WBC URINE: 6 /HPF

## 2024-03-12 PROCEDURE — 86803 HEPATITIS C AB TEST: CPT | Performed by: NURSE PRACTITIONER

## 2024-03-12 PROCEDURE — 86706 HEP B SURFACE ANTIBODY: CPT | Performed by: NURSE PRACTITIONER

## 2024-03-12 PROCEDURE — G0463 HOSPITAL OUTPT CLINIC VISIT: HCPCS

## 2024-03-12 PROCEDURE — 87491 CHLMYD TRACH DNA AMP PROBE: CPT | Performed by: NURSE PRACTITIONER

## 2024-03-12 PROCEDURE — 99213 OFFICE O/P EST LOW 20 MIN: CPT | Performed by: NURSE PRACTITIONER

## 2024-03-12 PROCEDURE — 81025 URINE PREGNANCY TEST: CPT | Performed by: NURSE PRACTITIONER

## 2024-03-12 PROCEDURE — 81001 URINALYSIS AUTO W/SCOPE: CPT | Performed by: NURSE PRACTITIONER

## 2024-03-12 PROCEDURE — 0352U MULTIPLEX VAGINAL PANEL BY PCR: CPT | Performed by: NURSE PRACTITIONER

## 2024-03-12 PROCEDURE — 36415 COLL VENOUS BLD VENIPUNCTURE: CPT | Performed by: NURSE PRACTITIONER

## 2024-03-12 PROCEDURE — 86780 TREPONEMA PALLIDUM: CPT | Performed by: NURSE PRACTITIONER

## 2024-03-12 PROCEDURE — 87186 SC STD MICRODIL/AGAR DIL: CPT | Performed by: NURSE PRACTITIONER

## 2024-03-12 PROCEDURE — 87389 HIV-1 AG W/HIV-1&-2 AB AG IA: CPT | Performed by: NURSE PRACTITIONER

## 2024-03-12 PROCEDURE — 87086 URINE CULTURE/COLONY COUNT: CPT | Performed by: NURSE PRACTITIONER

## 2024-03-12 RX ORDER — METRONIDAZOLE 500 MG/1
500 TABLET ORAL 2 TIMES DAILY
Qty: 14 TABLET | Refills: 0 | Status: SHIPPED | OUTPATIENT
Start: 2024-03-12 | End: 2024-03-19

## 2024-03-12 RX ORDER — NITROFURANTOIN 25; 75 MG/1; MG/1
100 CAPSULE ORAL 2 TIMES DAILY
Qty: 10 CAPSULE | Refills: 0 | Status: SHIPPED | OUTPATIENT
Start: 2024-03-12 | End: 2024-03-17

## 2024-03-12 ASSESSMENT — ENCOUNTER SYMPTOMS
NAUSEA: 0
ABDOMINAL PAIN: 1
CHILLS: 0
FLANK PAIN: 0
FEVER: 0
DIARRHEA: 0
DYSURIA: 0
VOMITING: 0
HEMATURIA: 0

## 2024-03-12 ASSESSMENT — ACTIVITIES OF DAILY LIVING (ADL)
ADLS_ACUITY_SCORE: 35
ADLS_ACUITY_SCORE: 35

## 2024-03-12 NOTE — DISCHARGE INSTRUCTIONS
Your urine does show signs of infection and therefore I am treating you for urinary tract infection with an antibiotic.  Take the antibiotic until it is finished.    We will notify you of your other results when they are available.    Follow-up with your primary doctor if no improvement in symptoms.    Return to urgent care or emergency room for any worsening or concerning symptoms.

## 2024-03-12 NOTE — ED TRIAGE NOTES
Pt presents with c/o vaginal discharge. Pt states she tested positive for trichomonas for January 18th and that's when sx started around. Missed OB appointment due to going to the wrong clinic. No otc meds.

## 2024-03-12 NOTE — ED PROVIDER NOTES
History     Chief Complaint   Patient presents with    Vaginal Problem     Tested positive for trich in jan and wasn't treated.      HPI  Julia Fierro is a 38 year old female who presents to urgent care with concerns of STD infection.  Patient tells me that she has had abnormal vaginal discharge, vaginal discomfort and an overall feeling of being unwell over the last couple of weeks.  She was supposed to go and see her OB/GYN earlier today but missed the appointment as she went to the wrong clinic.  She presents today noting that when she had called the clinic she was told that she tested positive for trichomonas in January 2024 but she was never treated for this.  Patient does not remember being notified or having medication prescribed.  She denies dysuria, hematuria or flank pain.  She has general abdominal discomfort.  No nausea, vomiting or diarrhea.  She has not had sexual intercourse since January 2024.  Denies concern for pregnancy.  LMP was last week and she notes that they usually irregular due to endometriosis.    Reviewed her chart visit at [facility] urgent care and noted that she tested positive for bacterial vaginosis as well as trichomonas.  She had been empirically treated for gonorrhea and chlamydia with ceftriaxone and azithromycin prior to being discharged at that time.    She is currently requesting STD testing.  Is currently not with her boyfriend and notes she is not sexually active.    Allergies:  Allergies   Allergen Reactions    Sertraline     Sertraline Hcl      Lightheaded  Shakes        Shakes      Penicillins Rash       Problem List:    Patient Active Problem List    Diagnosis Date Noted    Morbid obesity (H) 03/07/2022     Priority: Medium    PTSD (post-traumatic stress disorder) 07/09/2021     Priority: Medium    Myopia of both eyes with astigmatism 06/08/2021     Priority: Medium    Paroxysmal supraventricular tachycardia 07/24/2017     Priority: Medium    Moderate episode  of recurrent major depressive disorder (H) 2015     Priority: Medium    Anxiety 2014     Priority: Medium        Past Medical History:    Past Medical History:   Diagnosis Date    Anxiety     Anxiety 2014    Major depression, recurrent (H24) 3/25/2015    Moderate episode of recurrent major depressive disorder (H) 3/25/2015    Panic attacks 7/15/2015    Paroxysmal supraventricular tachycardia 2017    PTSD (post-traumatic stress disorder) 2021    Tobacco abuse 2017       Past Surgical History:    Past Surgical History:   Procedure Laterality Date    EXTRACTION(S) DENTAL      wisdom teeth extracted    LAPAROSCOPY      Endometriosis       Family History:    Family History   Problem Relation Age of Onset    Arthritis Mother     Coronary Artery Disease Mother     Hyperlipidemia Father     Hypertension Father     Other Cancer Father     Diabetes Paternal Grandmother        Social History:  Marital Status:  Single [1]  Social History     Tobacco Use    Smoking status: Former     Packs/day: .25     Types: Cigarettes, Vaping Device     Quit date: 2021     Years since quittin.2    Smokeless tobacco: Never    Tobacco comments:     tried to quit yes, yr quit , no passive exposure   Substance Use Topics    Alcohol use: Not Currently     Comment: occasionally    Drug use: No        Medications:    albuterol (PROAIR HFA/PROVENTIL HFA/VENTOLIN HFA) 108 (90 Base) MCG/ACT inhaler  buPROPion (WELLBUTRIN XL) 150 MG 24 hr tablet  nitroFURantoin macrocrystal-monohydrate (MACROBID) 100 MG capsule          Review of Systems   Constitutional:  Negative for chills and fever.   Gastrointestinal:  Positive for abdominal pain. Negative for diarrhea, nausea and vomiting.   Genitourinary:  Positive for vaginal discharge and vaginal pain. Negative for dysuria, flank pain, genital sores, hematuria and vaginal bleeding.   All other systems reviewed and are negative.      Physical Exam   BP:  "127/84  Pulse: 64  Temp: 97.4  F (36.3  C)  Resp: 16  Height: 170.2 cm (5' 7\")  Weight: 117.4 kg (258 lb 13.1 oz)  SpO2: 97 %      Physical Exam  Vitals and nursing note reviewed.   Constitutional:       General: She is not in acute distress.     Appearance: Normal appearance. She is well-developed. She is not diaphoretic.   HENT:      Head: Normocephalic and atraumatic.   Eyes:      Conjunctiva/sclera: Conjunctivae normal.   Cardiovascular:      Rate and Rhythm: Normal rate and regular rhythm.      Heart sounds: Normal heart sounds.   Pulmonary:      Effort: Pulmonary effort is normal. No respiratory distress.      Breath sounds: Normal breath sounds. No wheezing, rhonchi or rales.   Abdominal:      General: Abdomen is flat.      Tenderness: There is no abdominal tenderness. There is no right CVA tenderness, left CVA tenderness, guarding or rebound.   Genitourinary:     Comments: Deferred  Musculoskeletal:      Cervical back: Normal range of motion and neck supple.   Skin:     General: Skin is warm and dry.      Coloration: Skin is not pale.   Neurological:      Mental Status: She is alert and oriented to person, place, and time.         ED Course        Procedures             Results for orders placed or performed during the hospital encounter of 03/12/24 (from the past 24 hour(s))   UA with Microscopic reflex to Culture    Specimen: Urine, Midstream   Result Value Ref Range    Color Urine Light Yellow Colorless, Straw, Light Yellow, Yellow    Appearance Urine Clear Clear    Glucose Urine Negative Negative mg/dL    Bilirubin Urine Negative Negative    Ketones Urine Negative Negative mg/dL    Specific Gravity Urine 1.019 1.003 - 1.035    Blood Urine Negative Negative    pH Urine 5.0 4.7 - 8.0    Protein Albumin Urine Negative Negative mg/dL    Urobilinogen Urine Normal Normal, 2.0 mg/dL    Nitrite Urine Negative Negative    Leukocyte Esterase Urine Large (A) Negative    Mucus Urine Present (A) None Seen /LPF    RBC " Urine 1 <=2 /HPF    WBC Urine 6 (H) <=5 /HPF    Squamous Epithelials Urine 2 (H) <=1 /HPF    Narrative    Urine Culture ordered based on laboratory criteria       Medications - No data to display    Assessments & Plan (with Medical Decision Making)  38-year-old female that presented with concern of STD or vaginal infection.  She recently discovered that she had tested positive for trichomonas and bacterial vaginosis in January 2024 but did not get treated for it.  She has been having some symptoms of which brought her in today.  She has a soft and nontender abdomen on palpation.  No CVA tenderness appreciated.  She is afebrile.  Urinalysis was positive for large amount of leukocytes with slightly elevated WBC.  Patient will be treated for urinary tract infection with Macrobid.  Urine culture results pending.  STD testing and wet prep was done today with results pending at discharge.  Patient will be notified of results when they are available.  Any additional treatment needed will be discussed based on these results.  Advised patient to take Tylenol or ibuprofen as needed for any pain.  Follow-up with OB/GYN or primary doctor if no improvement in symptoms.  Return to urgent care or emergency room for any worsening or concerning symptoms.     I have reviewed the nursing notes.    I have reviewed the findings, diagnosis, plan and need for follow up with the patient.  This document was prepared using a combination of typing and voice generated software.  While every attempt was made for accuracy, spelling and grammatical errors may exist.         New Prescriptions    NITROFURANTOIN MACROCRYSTAL-MONOHYDRATE (MACROBID) 100 MG CAPSULE    Take 1 capsule (100 mg) by mouth 2 times daily for 5 days       Final diagnoses:   Urinary tract infection   Screen for STD (sexually transmitted disease)       3/12/2024   [facility]       Mpofu, Prudence, CNP  03/12/24 2012

## 2024-03-13 LAB
HBV SURFACE AB SERPL IA-ACNC: <3.5 M[IU]/ML
HBV SURFACE AB SERPL IA-ACNC: NONREACTIVE M[IU]/ML
HCV AB SERPL QL IA: NONREACTIVE
HIV 1+2 AB+HIV1 P24 AG SERPL QL IA: NONREACTIVE
T PALLIDUM AB SER QL: NONREACTIVE

## 2024-03-21 LAB — BACTERIA UR CULT: ABNORMAL

## 2024-03-28 DIAGNOSIS — F41.9 ANXIETY: ICD-10-CM

## 2024-03-28 DIAGNOSIS — F33.1 MODERATE EPISODE OF RECURRENT MAJOR DEPRESSIVE DISORDER (H): ICD-10-CM

## 2024-03-29 RX ORDER — BUPROPION HYDROCHLORIDE 150 MG/1
150 TABLET ORAL AT BEDTIME
Qty: 30 TABLET | Refills: 1 | OUTPATIENT
Start: 2024-03-29

## 2024-06-03 NOTE — PROGRESS NOTES
"  Assessment & Plan     1. PTSD (post-traumatic stress disorder)  Follow-up with mental health as scheduled.     2. Anxiety    3. Moderate episode of recurrent major depressive disorder (H)    4. Morbid obesity (H)      Nicotine/Tobacco Cessation  She reports that she has been smoking cigarettes and vaping device. She has never used smokeless tobacco.  Nicotine/Tobacco Cessation Plan        BMI  Estimated body mass index is 42.37 kg/m  as calculated from the following:    Height as of 3/12/24: 1.702 m (5' 7\").    Weight as of this encounter: 122.7 kg (270 lb 8.1 oz).       We did not establish care today, she is relocating to [place].      Chula Ruffin,   Certified Adult Nurse Practitioner  [phone]      Subjective   Julia is a 38 year old, presenting for the following health issues:  Establish Care    HPI       Depression and Anxiety   How are you doing with your depression since your last visit? Stable, ED notes reviewed.  She is establish with [facility] and will follow-up with them  How are you doing with your anxiety since your last visit?  stable  Are you having other symptoms that might be associated with depression or anxiety? No  Have you had a significant life event? 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  Follow-up with [facility] tomorrow.     Do you have any concerns with your use of alcohol or other drugs? Not now.      Social History     Tobacco Use    Smoking status: Every Day     Current packs/day: 0.00     Types: Cigarettes, Vaping Device     Last attempt to quit: 2021     Years since quittin.4    Smokeless tobacco: Never    Tobacco comments:     tried to quit yes, yr quit , no passive exposure   Vaping Use    Vaping status: Former   Substance Use Topics    Alcohol use: Not Currently     Comment: occasionally    Drug use: No         2022     3:00 PM 2022     1:24 PM 2024     2:44 PM   PHQ   PHQ-9 Total Score 7 2 19   Q9: Thoughts of better off " dead/self-harm past 2 weeks Not at all Not at all Not at all         4/11/2022     3:00 PM 11/23/2022     1:24 PM 6/6/2024     2:44 PM   KULDIP-7 SCORE   Total Score   16 (severe anxiety)   Total Score 3 3 16             Review of Systems  Constitutional, neuro, ENT, endocrine, pulmonary, cardiac, gastrointestinal, genitourinary, musculoskeletal, integument and psychiatric systems are negative, except as otherwise noted.      Objective    /62 (BP Location: Left arm, Patient Position: Sitting, Cuff Size: Adult Large)   Pulse 51   Temp 96.8  F (36  C) (Tympanic)   Resp 18   Wt 122.7 kg (270 lb 8.1 oz)   SpO2 98%   BMI 42.37 kg/m    Body mass index is 42.37 kg/m .  Physical Exam   GENERAL: alert and no distress  PSYCH: mentation appears normal, affect normal/bright            Signed Electronically by: Chula Ruffin NP

## 2024-06-06 ENCOUNTER — OFFICE VISIT (OUTPATIENT)
Dept: FAMILY MEDICINE | Facility: OTHER | Age: 39
End: 2024-06-06
Attending: NURSE PRACTITIONER
Payer: COMMERCIAL

## 2024-06-06 VITALS
TEMPERATURE: 96.8 F | DIASTOLIC BLOOD PRESSURE: 62 MMHG | HEART RATE: 51 BPM | WEIGHT: 270.5 LBS | BODY MASS INDEX: 42.37 KG/M2 | OXYGEN SATURATION: 98 % | SYSTOLIC BLOOD PRESSURE: 100 MMHG | RESPIRATION RATE: 18 BRPM

## 2024-06-06 DIAGNOSIS — F33.1 MODERATE EPISODE OF RECURRENT MAJOR DEPRESSIVE DISORDER (H): ICD-10-CM

## 2024-06-06 DIAGNOSIS — E66.01 MORBID OBESITY (H): ICD-10-CM

## 2024-06-06 DIAGNOSIS — F43.10 PTSD (POST-TRAUMATIC STRESS DISORDER): Primary | ICD-10-CM

## 2024-06-06 DIAGNOSIS — F41.9 ANXIETY: ICD-10-CM

## 2024-06-06 PROCEDURE — G0463 HOSPITAL OUTPT CLINIC VISIT: HCPCS

## 2024-06-06 PROCEDURE — 99214 OFFICE O/P EST MOD 30 MIN: CPT | Performed by: NURSE PRACTITIONER

## 2024-06-06 RX ORDER — GUANFACINE 1 MG/1
1 TABLET ORAL DAILY
COMMUNITY
Start: 2024-05-31 | End: 2024-06-30

## 2024-06-06 RX ORDER — ESCITALOPRAM OXALATE 10 MG/1
10 TABLET ORAL DAILY
COMMUNITY
Start: 2024-05-31 | End: 2024-06-30

## 2024-06-06 RX ORDER — SULFAMETHOXAZOLE/TRIMETHOPRIM 800-160 MG
1 TABLET ORAL
COMMUNITY
Start: 2024-06-03

## 2024-06-06 ASSESSMENT — ANXIETY QUESTIONNAIRES
GAD7 TOTAL SCORE: 16
4. TROUBLE RELAXING: NEARLY EVERY DAY
GAD7 TOTAL SCORE: 16
1. FEELING NERVOUS, ANXIOUS, OR ON EDGE: MORE THAN HALF THE DAYS
3. WORRYING TOO MUCH ABOUT DIFFERENT THINGS: MORE THAN HALF THE DAYS
GAD7 TOTAL SCORE: 16
7. FEELING AFRAID AS IF SOMETHING AWFUL MIGHT HAPPEN: MORE THAN HALF THE DAYS
2. NOT BEING ABLE TO STOP OR CONTROL WORRYING: NEARLY EVERY DAY
6. BECOMING EASILY ANNOYED OR IRRITABLE: MORE THAN HALF THE DAYS
5. BEING SO RESTLESS THAT IT IS HARD TO SIT STILL: MORE THAN HALF THE DAYS
IF YOU CHECKED OFF ANY PROBLEMS ON THIS QUESTIONNAIRE, HOW DIFFICULT HAVE THESE PROBLEMS MADE IT FOR YOU TO DO YOUR WORK, TAKE CARE OF THINGS AT HOME, OR GET ALONG WITH OTHER PEOPLE: EXTREMELY DIFFICULT
8. IF YOU CHECKED OFF ANY PROBLEMS, HOW DIFFICULT HAVE THESE MADE IT FOR YOU TO DO YOUR WORK, TAKE CARE OF THINGS AT HOME, OR GET ALONG WITH OTHER PEOPLE?: EXTREMELY DIFFICULT
7. FEELING AFRAID AS IF SOMETHING AWFUL MIGHT HAPPEN: MORE THAN HALF THE DAYS

## 2024-06-06 ASSESSMENT — PAIN SCALES - GENERAL: PAINLEVEL: NO PAIN (0)

## 2024-06-06 ASSESSMENT — PATIENT HEALTH QUESTIONNAIRE - PHQ9
10. IF YOU CHECKED OFF ANY PROBLEMS, HOW DIFFICULT HAVE THESE PROBLEMS MADE IT FOR YOU TO DO YOUR WORK, TAKE CARE OF THINGS AT HOME, OR GET ALONG WITH OTHER PEOPLE: EXTREMELY DIFFICULT
SUM OF ALL RESPONSES TO PHQ QUESTIONS 1-9: 19
SUM OF ALL RESPONSES TO PHQ QUESTIONS 1-9: 19

## 2024-06-06 NOTE — COMMUNITY RESOURCES LIST (ENGLISH)
June 6, 2024           YOUR PERSONALIZED LIST OF SERVICES & PROGRAMS           NAVIGATION    Eligibility Screening      [facility] insurance application assistance  [address] [address] (Distance: 4.3 miles)  Language: English  Fee: Free  Accessibility: Translation services      [facility] application assistance  [address] [address] (Distance: 4.3 miles)  Language: English  Fee: Free      [employer] [place] - SNAP (formerly food stamps) Screening and Application help  Phone: [phone]  Website: https://www.[employer].org/programs/[place]-food-helpline/  Language: English  Hours: Mon 10:00 AM - 5:00 PM Tue 10:00 AM - 5:00 PM Wed 10:00 AM - 5:00 PM Thu 10:00 AM - 5:00 PM Fri 10:00 AM - 5:00 PM  Fee: Free  Accessibility: [place] accessible, Blind accommodation, Deaf or hard of hearing, Translation services        ASSISTANCE    Nutrition Benefits      [facility] application assistance  [address] [address] (Distance: 4.3 miles)  Language: English  Fee: Free      [facility] application assistance  [address] [address] (Distance: 4.3 miles)  Language: English  Fee: Free  Accessibility: Translation services      [employer] [place] - SNAP (formerly food stamps) Screening and Application help  Phone: ([phone]) [phone]  Website: https://www.[employer].org/programs/[place]-food-helpline/  Language: English  Hours: Mon 10:00 AM - 5:00 PM Tue 10:00 AM - 5:00 PM Wed 10:00 AM - 5:00 PM Thu 10:00 AM - 5:00 PM Fri 10:00 AM - 5:00 PM  Fee: Free  Accessibility: [place] accessible, Blind accommodation, Deaf or hard of hearing, Translation services    Pantry      [employer] [place] - Food pantry [facility]  [address] [place] [address] (Distance: 19.6 miles)  Language: English  Fee: Free, Self pay      [employer] [facility]  [address] [place] [address] (Distance: 4.0 miles)  Language: English  Fee: Free      [employer] - [place] [employer]  Phone: [phone]  Website: www.[employer].org  Language: English, Estonian  Hours: Mon 9:00 AM - 3:30 PM Tue 9:00 AM - 6:30 PM Wed 9:00 AM - 3:30 PM Thu 9:00 AM - 12:30 PM Fri 9:00 AM - 12:30 PM Sat 9:00 AM - 12:00 PM  Fee: Free        & SHELTER    Housing      [facility] Shelter for families  [address] [place] [address] (Distance: 4.6 miles)  Phone: [phone]  Language: English  Fee: Free      [facility] Shelter for individuals  [address] [place] [address] (Distance: 4.6 miles)  Phone: [phone]  Language: English  Fee: Free      [facility]  Phone: [phone]  Website: https://www.[employer]/  Language: English, Hmong, Oromo, Rwandan, Estonian  Hours: Mon 9:00 AM - 5:00 PM Tue 9:00 AM - 5:00 PM Wed 9:00 AM - 5:00 PM Thu 9:00 AM - 5:00 PM Fri 9:00 AM - 5:00 PM  Fee: Insurance  Accessibility: Blind accommodation, Deaf or hard of hearing, Translation services  Transportation Options: Free transportation    Case Management      [facility] - Housing search assistance  [address] [place] [address] (Distance: 4.0 miles)  Language: English  Fee: Free      [employer] - Housing Stabilization Services  Phone: [phone]) [phone]  Website: https://homebasemn.com/  Language: English  Hours: Mon 8:00 AM - 4:00 PM Tue 8:00 AM - 4:00 PM Wed 8:00 AM - 4:00 PM Thu 8:00 AM - 4:00 PM Fri 8:00 AM - 4:00 PM  Fee: Free  Accessibility: Blind accommodation, Deaf or hard of hearing  Transportation Options: Free transportation    Drop-In Services      \[facility]\"" POSTAL SERVICE - MAIL SERVICE FOR THE HOMELESS  Phone: [phone]) 644-8796  Website: https://www.[employer].com               IMPORTANT NUMBERS & WEBSITES        Emergency Services  911  .   [place] [address]  http://211unitedway.org  .   Poison Control  (390) 496-7201 http://[place]poison.org http://wis[place]poison.org  .     Suicide and Crisis Lifeline  988 http://988lifeline.org  .   [employer] [place] Child Abuse Hotline  105.885.2488 http://Childhelphotline.org   .   [place] Sexual Assault Hotline  (669) 838-4523 (HOPE) http://[employer].org   .     [place] Runaway Safeline  (734) 491-5951 (RUNAWAY) http://Pay[employer]ruNuzzel.Shipping Easy  .   Pregnancy & Postpartum Support  Call/text 936-103-4620  [place]: http://ppsupportmn.org  [place]: http://psichapters.com/wi  .   Substance Abuse National Helpline ([facility])  479-026-HELP (9501) http://Findtreatment.gov   .                DISCLAIMER: These resources have been generated via the [employer]. [employer] does not endorse any service providers mentioned in this resource list. [employer] does not guarantee that the services mentioned in this resource list will be available to you or will improve your health or wellness.    [facility]

## 2024-08-29 ENCOUNTER — TRANSFERRED RECORDS (OUTPATIENT)
Dept: HEALTH INFORMATION MANAGEMENT | Facility: HOSPITAL | Age: 39
End: 2024-08-29

## 2024-08-29 LAB
HEP C HIM: NORMAL
HIV 1&2 EXT: NORMAL

## 2024-09-16 ENCOUNTER — TRANSFERRED RECORDS (OUTPATIENT)
Dept: HEALTH INFORMATION MANAGEMENT | Facility: HOSPITAL | Age: 39
End: 2024-09-16

## 2024-09-16 LAB
HPV ABSTRACT: NORMAL
PAP-ABSTRACT: NORMAL
